# Patient Record
Sex: FEMALE | Race: WHITE | Employment: FULL TIME | URBAN - METROPOLITAN AREA
[De-identification: names, ages, dates, MRNs, and addresses within clinical notes are randomized per-mention and may not be internally consistent; named-entity substitution may affect disease eponyms.]

---

## 2017-04-21 ENCOUNTER — OFFICE VISIT (OUTPATIENT)
Dept: FAMILY MEDICINE CLINIC | Age: 37
End: 2017-04-21

## 2017-04-21 VITALS
WEIGHT: 134 LBS | SYSTOLIC BLOOD PRESSURE: 120 MMHG | HEIGHT: 65 IN | BODY MASS INDEX: 22.33 KG/M2 | DIASTOLIC BLOOD PRESSURE: 80 MMHG

## 2017-04-21 DIAGNOSIS — N63.0 BREAST NODULE: ICD-10-CM

## 2017-04-21 DIAGNOSIS — N64.52 DISCHARGE FROM RIGHT NIPPLE: Primary | ICD-10-CM

## 2017-04-21 PROCEDURE — 99213 OFFICE O/P EST LOW 20 MIN: CPT | Performed by: PHYSICIAN ASSISTANT

## 2017-04-21 ASSESSMENT — ENCOUNTER SYMPTOMS: RESPIRATORY NEGATIVE: 1

## 2017-04-27 ENCOUNTER — HOSPITAL ENCOUNTER (OUTPATIENT)
Dept: MAMMOGRAPHY | Age: 37
Discharge: HOME | End: 2017-04-27
Attending: PHYSICIAN ASSISTANT | Admitting: PHYSICIAN ASSISTANT

## 2017-04-27 ENCOUNTER — TELEPHONE (OUTPATIENT)
Dept: FAMILY MEDICINE CLINIC | Age: 37
End: 2017-04-27

## 2017-04-27 DIAGNOSIS — R23.4 BREAST SKIN CHANGES: Primary | ICD-10-CM

## 2017-04-27 DIAGNOSIS — N64.52 DISCHARGE FROM RIGHT NIPPLE: ICD-10-CM

## 2017-04-27 DIAGNOSIS — N64.52 NIPPLE DISCHARGE IN FEMALE: ICD-10-CM

## 2017-04-27 DIAGNOSIS — N63.10 LUMP OF RIGHT BREAST: ICD-10-CM

## 2017-06-21 ENCOUNTER — OFFICE VISIT (OUTPATIENT)
Dept: OBGYN CLINIC | Age: 37
End: 2017-06-21

## 2017-06-21 VITALS
HEIGHT: 65 IN | DIASTOLIC BLOOD PRESSURE: 66 MMHG | BODY MASS INDEX: 22.37 KG/M2 | SYSTOLIC BLOOD PRESSURE: 118 MMHG | HEART RATE: 93 BPM | TEMPERATURE: 99.1 F | WEIGHT: 134.25 LBS

## 2017-06-21 DIAGNOSIS — Z01.419 WOMEN'S ANNUAL ROUTINE GYNECOLOGICAL EXAMINATION: Primary | ICD-10-CM

## 2017-06-21 DIAGNOSIS — Z12.4 PAP SMEAR FOR CERVICAL CANCER SCREENING: ICD-10-CM

## 2017-06-21 DIAGNOSIS — N92.6 IRREGULAR MENSES: ICD-10-CM

## 2017-06-21 DIAGNOSIS — Z32.02 PREGNANCY TEST NEGATIVE: ICD-10-CM

## 2017-06-21 LAB
BASOPHILS ABSOLUTE: 0.1 K/UL (ref 0–0.2)
BASOPHILS RELATIVE PERCENT: 0.9 %
CONTROL: NORMAL
EOSINOPHILS ABSOLUTE: 0.1 K/UL (ref 0–0.6)
EOSINOPHILS RELATIVE PERCENT: 1.1 %
FOLLICLE STIMULATING HORMONE: 7.7 MIU/ML
HCT VFR BLD CALC: 40.5 % (ref 36–48)
HEMOGLOBIN: 13.4 G/DL (ref 12–16)
LUTEINIZING HORMONE: 11.6 MIU/ML
LYMPHOCYTES ABSOLUTE: 2.1 K/UL (ref 1–5.1)
LYMPHOCYTES RELATIVE PERCENT: 31.1 %
MCH RBC QN AUTO: 33.1 PG (ref 26–34)
MCHC RBC AUTO-ENTMCNC: 33.2 G/DL (ref 31–36)
MCV RBC AUTO: 99.6 FL (ref 80–100)
MONOCYTES ABSOLUTE: 0.5 K/UL (ref 0–1.3)
MONOCYTES RELATIVE PERCENT: 7.1 %
NEUTROPHILS ABSOLUTE: 4 K/UL (ref 1.7–7.7)
NEUTROPHILS RELATIVE PERCENT: 59.8 %
PDW BLD-RTO: 13.9 % (ref 12.4–15.4)
PLATELET # BLD: 192 K/UL (ref 135–450)
PMV BLD AUTO: 9.4 FL (ref 5–10.5)
PREGNANCY TEST URINE, POC: NEGATIVE
PROLACTIN: 16.4 NG/ML
RBC # BLD: 4.06 M/UL (ref 4–5.2)
TSH REFLEX: 1.28 UIU/ML (ref 0.27–4.2)
WBC # BLD: 6.6 K/UL (ref 4–11)

## 2017-06-21 PROCEDURE — 81025 URINE PREGNANCY TEST: CPT | Performed by: OBSTETRICS & GYNECOLOGY

## 2017-06-21 PROCEDURE — 99395 PREV VISIT EST AGE 18-39: CPT | Performed by: OBSTETRICS & GYNECOLOGY

## 2017-06-21 PROCEDURE — 36415 COLL VENOUS BLD VENIPUNCTURE: CPT | Performed by: OBSTETRICS & GYNECOLOGY

## 2017-06-21 RX ORDER — LEVONORGESTREL AND ETHINYL ESTRADIOL 0.15-0.03
1 KIT ORAL DAILY
Qty: 1 PACKET | Refills: 3 | Status: SHIPPED | OUTPATIENT
Start: 2017-06-21 | End: 2017-07-14 | Stop reason: SINTOL

## 2017-06-21 ASSESSMENT — ENCOUNTER SYMPTOMS
DIARRHEA: 0
SHORTNESS OF BREATH: 0
ABDOMINAL PAIN: 0
VOMITING: 0
ABDOMINAL DISTENTION: 0
NAUSEA: 0
CONSTIPATION: 0

## 2017-06-21 ASSESSMENT — PATIENT HEALTH QUESTIONNAIRE - PHQ9
SUM OF ALL RESPONSES TO PHQ QUESTIONS 1-9: 0
SUM OF ALL RESPONSES TO PHQ9 QUESTIONS 1 & 2: 0
1. LITTLE INTEREST OR PLEASURE IN DOING THINGS: 0
2. FEELING DOWN, DEPRESSED OR HOPELESS: 0

## 2017-06-23 LAB
HPV COMMENT: NORMAL
HPV TYPE 16: NOT DETECTED
HPV TYPE 18: NOT DETECTED
HPVOH (OTHER TYPES): NOT DETECTED

## 2017-07-14 ENCOUNTER — OFFICE VISIT (OUTPATIENT)
Dept: FAMILY MEDICINE CLINIC | Age: 37
End: 2017-07-14

## 2017-07-14 VITALS
SYSTOLIC BLOOD PRESSURE: 108 MMHG | WEIGHT: 137.8 LBS | BODY MASS INDEX: 22.96 KG/M2 | HEIGHT: 65 IN | HEART RATE: 68 BPM | OXYGEN SATURATION: 99 % | DIASTOLIC BLOOD PRESSURE: 76 MMHG

## 2017-07-14 DIAGNOSIS — F41.9 ANXIETY: Primary | ICD-10-CM

## 2017-07-14 PROCEDURE — 99213 OFFICE O/P EST LOW 20 MIN: CPT | Performed by: PHYSICIAN ASSISTANT

## 2018-01-15 NOTE — TELEPHONE ENCOUNTER
Brandi Lank is requesting refill(s) Effexor  (Name Brand Only)  One pill left. If a PA is needed she said she will take the generic so she is not left without taking medication.   Last OV 07-14-17 (pertaining to medication)  LR 07-14-17 (per medication requested)  Next office visit scheduled or attempted Yes   If no, reason:  01-23-18  [facility], [facility] [address] 69

## 2018-01-16 RX ORDER — VENLAFAXINE HYDROCHLORIDE 37.5 MG/1
CAPSULE, EXTENDED RELEASE ORAL
Qty: 30 CAPSULE | Refills: 3 | Status: SHIPPED | OUTPATIENT
Start: 2018-01-16 | End: 2018-05-14 | Stop reason: SDUPTHER

## 2018-01-16 NOTE — TELEPHONE ENCOUNTER
Patrick is calling about the patients Effexor medication. They said that the patients insurance company is requiring a Prior Authorization for the medication. The patient informed them that she will take the generic form of the medication, but they will need a script that doesn't have the note stating brand only attached. The patient informed the pharmacist that she only has one pill left and would need to get the refill as soon as possible.

## 2018-01-23 ENCOUNTER — OFFICE VISIT (OUTPATIENT)
Dept: FAMILY MEDICINE CLINIC | Age: 38
End: 2018-01-23

## 2018-01-23 VITALS
HEART RATE: 72 BPM | BODY MASS INDEX: 23.31 KG/M2 | DIASTOLIC BLOOD PRESSURE: 82 MMHG | WEIGHT: 139 LBS | SYSTOLIC BLOOD PRESSURE: 120 MMHG

## 2018-01-23 DIAGNOSIS — Z87.442 HISTORY OF KIDNEY STONES: ICD-10-CM

## 2018-01-23 DIAGNOSIS — F41.8 ANXIETY WITH DEPRESSION: Primary | ICD-10-CM

## 2018-01-23 PROCEDURE — G8420 CALC BMI NORM PARAMETERS: HCPCS | Performed by: FAMILY MEDICINE

## 2018-01-23 PROCEDURE — 99213 OFFICE O/P EST LOW 20 MIN: CPT | Performed by: FAMILY MEDICINE

## 2018-01-23 PROCEDURE — 1036F TOBACCO NON-USER: CPT | Performed by: FAMILY MEDICINE

## 2018-01-23 PROCEDURE — G8484 FLU IMMUNIZE NO ADMIN: HCPCS | Performed by: FAMILY MEDICINE

## 2018-01-23 PROCEDURE — G8427 DOCREV CUR MEDS BY ELIG CLIN: HCPCS | Performed by: FAMILY MEDICINE

## 2018-04-26 ENCOUNTER — OFFICE VISIT (OUTPATIENT)
Dept: OBGYN CLINIC | Age: 38
End: 2018-04-26

## 2018-04-26 VITALS
BODY MASS INDEX: 22.42 KG/M2 | HEIGHT: 65 IN | HEART RATE: 99 BPM | SYSTOLIC BLOOD PRESSURE: 118 MMHG | DIASTOLIC BLOOD PRESSURE: 80 MMHG | TEMPERATURE: 98.2 F | WEIGHT: 134.6 LBS

## 2018-04-26 DIAGNOSIS — N92.0 MENORRHAGIA WITH REGULAR CYCLE: Primary | ICD-10-CM

## 2018-04-26 DIAGNOSIS — Z30.09 GENERAL COUNSELING AND ADVICE FOR CONTRACEPTIVE MANAGEMENT: ICD-10-CM

## 2018-04-26 PROCEDURE — G8420 CALC BMI NORM PARAMETERS: HCPCS | Performed by: OBSTETRICS & GYNECOLOGY

## 2018-04-26 PROCEDURE — G8427 DOCREV CUR MEDS BY ELIG CLIN: HCPCS | Performed by: OBSTETRICS & GYNECOLOGY

## 2018-04-26 PROCEDURE — 1036F TOBACCO NON-USER: CPT | Performed by: OBSTETRICS & GYNECOLOGY

## 2018-04-26 PROCEDURE — 99213 OFFICE O/P EST LOW 20 MIN: CPT | Performed by: OBSTETRICS & GYNECOLOGY

## 2018-04-26 RX ORDER — NORETHINDRONE ACETATE AND ETHINYL ESTRADIOL 1MG-20(21)
1 KIT ORAL DAILY
Qty: 1 PACKET | Refills: 5 | Status: SHIPPED | OUTPATIENT
Start: 2018-04-26 | End: 2018-11-14 | Stop reason: ALTCHOICE

## 2018-04-26 ASSESSMENT — ENCOUNTER SYMPTOMS
DIARRHEA: 0
SHORTNESS OF BREATH: 0
NAUSEA: 0
VOMITING: 0
CONSTIPATION: 0
ABDOMINAL PAIN: 0

## 2018-05-04 ENCOUNTER — TELEPHONE (OUTPATIENT)
Dept: OBGYN CLINIC | Age: 38
End: 2018-05-04

## 2018-05-11 RX ORDER — VENLAFAXINE HYDROCHLORIDE 37.5 MG/1
CAPSULE, EXTENDED RELEASE ORAL
Qty: 30 CAPSULE | Refills: 5 | Status: CANCELLED | OUTPATIENT
Start: 2018-05-11

## 2018-05-14 RX ORDER — VENLAFAXINE HYDROCHLORIDE 37.5 MG/1
CAPSULE, EXTENDED RELEASE ORAL
Qty: 30 CAPSULE | Refills: 3 | Status: CANCELLED | OUTPATIENT
Start: 2018-05-14

## 2018-05-15 ENCOUNTER — TELEPHONE (OUTPATIENT)
Dept: FAMILY MEDICINE CLINIC | Age: 38
End: 2018-05-15

## 2018-05-17 ENCOUNTER — TELEPHONE (OUTPATIENT)
Dept: ORTHOPEDIC SURGERY | Age: 38
End: 2018-05-17

## 2018-06-18 ENCOUNTER — OFFICE VISIT (OUTPATIENT)
Dept: OBGYN CLINIC | Age: 38
End: 2018-06-18

## 2018-06-18 VITALS
TEMPERATURE: 98.5 F | WEIGHT: 139 LBS | BODY MASS INDEX: 23.13 KG/M2 | HEART RATE: 95 BPM | DIASTOLIC BLOOD PRESSURE: 84 MMHG | SYSTOLIC BLOOD PRESSURE: 132 MMHG

## 2018-06-18 DIAGNOSIS — N92.0 MENORRHAGIA WITH REGULAR CYCLE: ICD-10-CM

## 2018-06-18 DIAGNOSIS — Z30.430 ENCOUNTER FOR IUD INSERTION: Primary | ICD-10-CM

## 2018-06-18 DIAGNOSIS — Z32.02 NEGATIVE PREGNANCY TEST: ICD-10-CM

## 2018-06-18 LAB
CONTROL: NORMAL
PREGNANCY TEST URINE, POC: NEGATIVE

## 2018-06-18 PROCEDURE — 58300 INSERT INTRAUTERINE DEVICE: CPT | Performed by: OBSTETRICS & GYNECOLOGY

## 2018-06-18 PROCEDURE — 81025 URINE PREGNANCY TEST: CPT | Performed by: OBSTETRICS & GYNECOLOGY

## 2018-06-18 PROCEDURE — 99999 PR OFFICE/OUTPT VISIT,PROCEDURE ONLY: CPT | Performed by: OBSTETRICS & GYNECOLOGY

## 2018-06-24 ASSESSMENT — ENCOUNTER SYMPTOMS
SHORTNESS OF BREATH: 0
ABDOMINAL DISTENTION: 0

## 2018-07-16 ENCOUNTER — OFFICE VISIT (OUTPATIENT)
Dept: OBGYN CLINIC | Age: 38
End: 2018-07-16

## 2018-07-16 VITALS
WEIGHT: 140.25 LBS | HEART RATE: 85 BPM | TEMPERATURE: 98.3 F | DIASTOLIC BLOOD PRESSURE: 74 MMHG | SYSTOLIC BLOOD PRESSURE: 128 MMHG | BODY MASS INDEX: 23.34 KG/M2

## 2018-07-16 DIAGNOSIS — N92.6 IRREGULAR BLEEDING: ICD-10-CM

## 2018-07-16 DIAGNOSIS — N92.6 IRREGULAR BLEEDING: Primary | ICD-10-CM

## 2018-07-16 DIAGNOSIS — Z97.5 IUD (INTRAUTERINE DEVICE) IN PLACE: ICD-10-CM

## 2018-07-16 DIAGNOSIS — N92.0 MENORRHAGIA WITH REGULAR CYCLE: ICD-10-CM

## 2018-07-16 DIAGNOSIS — Z97.5 IUD (INTRAUTERINE DEVICE) IN PLACE: Primary | ICD-10-CM

## 2018-07-16 PROCEDURE — 76856 US EXAM PELVIC COMPLETE: CPT | Performed by: OBSTETRICS & GYNECOLOGY

## 2018-07-16 PROCEDURE — 1036F TOBACCO NON-USER: CPT | Performed by: OBSTETRICS & GYNECOLOGY

## 2018-07-16 PROCEDURE — 99213 OFFICE O/P EST LOW 20 MIN: CPT | Performed by: OBSTETRICS & GYNECOLOGY

## 2018-07-16 PROCEDURE — G8427 DOCREV CUR MEDS BY ELIG CLIN: HCPCS | Performed by: OBSTETRICS & GYNECOLOGY

## 2018-07-16 PROCEDURE — G8420 CALC BMI NORM PARAMETERS: HCPCS | Performed by: OBSTETRICS & GYNECOLOGY

## 2018-07-16 ASSESSMENT — ENCOUNTER SYMPTOMS
NAUSEA: 0
CONSTIPATION: 0
VOMITING: 0
ABDOMINAL PAIN: 0
DIARRHEA: 0
SHORTNESS OF BREATH: 0

## 2018-07-17 NOTE — PROGRESS NOTES
note and vitals reviewed. PELVIC ULTRASOUND without DOPPLER INTERROGATION   NON OB    DATE: 07/16/2018    PHYSICIAN:  HALEY Camarena D.O.    SONOGRAPHER:  BRITT [facility]    INDICATION:  IUD placement    COMPARISON: none    TYPE OF SCAN: vaginal    FINDINGS:    The cul de sac is normal. No cul de sac fluid is appreciated. The cervix is normal and not enlarged. Nabothian cyst/s is not noted within the uterine cervix. The uterus is measures 7.51cm x 4.99cm x 4.50cm. The uterus is anteverted. The endometrium measures 5.34 mm. No uterine anomalies are noted. The myometrium is normal in appearance. The right ovary is present and normal.  The right ovary measures 4.07cm x 3.25cm x 2.47cm. Ovary/adnexal findings:  no masses seen. The right adnexa is normal.    The left ovary is present and normal.  The left ovary measures 1.68cm x 2.84cm x 2.39cm. Ovary/adnexal findings:  no masses seen. The left adnexa is normal.    IMPRESSION:  Normal appearing uterus with IUD in appropriate position. Normal appearing right and left ovary. Imaging is limited secondary to bowel gas. The patient is well aware of the limitations of ultrasound in the detection of anomalies of the abdomen and pelvis. Assessment:       Diagnosis Orders   1. Irregular bleeding     2. IUD (intrauterine device) in place     3. Menorrhagia with regular cycle             Plan:      Ultrasound result reviewed with patient--reassurance  Menstrual diary  Follow up prn and 1 year.

## 2018-11-12 ENCOUNTER — TELEPHONE (OUTPATIENT)
Dept: FAMILY MEDICINE CLINIC | Age: 38
End: 2018-11-12

## 2018-11-12 NOTE — TELEPHONE ENCOUNTER
Martha Shine is requesting refill(s) Effexor  Last OV 1/23/18 (pertaining to medication)  LR 5/14/18 (per medication requested)  Next office visit scheduled or attempted Yes   If no, reason:  Pt is scheduled for 11/14/18

## 2018-11-13 RX ORDER — VENLAFAXINE HYDROCHLORIDE 37.5 MG/1
CAPSULE, EXTENDED RELEASE ORAL
Qty: 30 CAPSULE | Refills: 0 | Status: SHIPPED | OUTPATIENT
Start: 2018-11-13 | End: 2018-11-14 | Stop reason: SDUPTHER

## 2018-11-14 ENCOUNTER — OFFICE VISIT (OUTPATIENT)
Dept: FAMILY MEDICINE CLINIC | Age: 38
End: 2018-11-14
Payer: COMMERCIAL

## 2018-11-14 VITALS
RESPIRATION RATE: 18 BRPM | HEART RATE: 88 BPM | TEMPERATURE: 98.4 F | OXYGEN SATURATION: 97 % | HEIGHT: 65 IN | SYSTOLIC BLOOD PRESSURE: 118 MMHG | DIASTOLIC BLOOD PRESSURE: 82 MMHG | WEIGHT: 148 LBS | BODY MASS INDEX: 24.66 KG/M2

## 2018-11-14 DIAGNOSIS — F41.8 ANXIETY WITH DEPRESSION: Primary | ICD-10-CM

## 2018-11-14 PROCEDURE — 99213 OFFICE O/P EST LOW 20 MIN: CPT | Performed by: PHYSICIAN ASSISTANT

## 2018-11-14 PROCEDURE — G8420 CALC BMI NORM PARAMETERS: HCPCS | Performed by: PHYSICIAN ASSISTANT

## 2018-11-14 PROCEDURE — 1036F TOBACCO NON-USER: CPT | Performed by: PHYSICIAN ASSISTANT

## 2018-11-14 PROCEDURE — G8484 FLU IMMUNIZE NO ADMIN: HCPCS | Performed by: PHYSICIAN ASSISTANT

## 2018-11-14 PROCEDURE — G8427 DOCREV CUR MEDS BY ELIG CLIN: HCPCS | Performed by: PHYSICIAN ASSISTANT

## 2018-11-14 RX ORDER — VENLAFAXINE HYDROCHLORIDE 37.5 MG/1
CAPSULE, EXTENDED RELEASE ORAL
Qty: 90 CAPSULE | Refills: 2 | Status: SHIPPED | OUTPATIENT
Start: 2018-11-14 | End: 2019-06-11 | Stop reason: SDUPTHER

## 2019-01-08 ENCOUNTER — OFFICE VISIT (OUTPATIENT)
Dept: OBGYN CLINIC | Age: 39
End: 2019-01-08
Payer: MEDICAID

## 2019-01-08 VITALS
BODY MASS INDEX: 24.36 KG/M2 | HEART RATE: 85 BPM | TEMPERATURE: 98.9 F | SYSTOLIC BLOOD PRESSURE: 126 MMHG | WEIGHT: 146.4 LBS | DIASTOLIC BLOOD PRESSURE: 84 MMHG

## 2019-01-08 DIAGNOSIS — N92.1 MENORRHAGIA WITH IRREGULAR CYCLE: ICD-10-CM

## 2019-01-08 DIAGNOSIS — N92.6 IRREGULAR PERIODS/MENSTRUAL CYCLES: Primary | ICD-10-CM

## 2019-01-08 LAB — GONADOTROPIN, CHORIONIC (HCG) QUANT: <5 MIU/ML

## 2019-01-08 PROCEDURE — G8484 FLU IMMUNIZE NO ADMIN: HCPCS | Performed by: OBSTETRICS & GYNECOLOGY

## 2019-01-08 PROCEDURE — 36415 COLL VENOUS BLD VENIPUNCTURE: CPT | Performed by: OBSTETRICS & GYNECOLOGY

## 2019-01-08 PROCEDURE — G8427 DOCREV CUR MEDS BY ELIG CLIN: HCPCS | Performed by: OBSTETRICS & GYNECOLOGY

## 2019-01-08 PROCEDURE — 1036F TOBACCO NON-USER: CPT | Performed by: OBSTETRICS & GYNECOLOGY

## 2019-01-08 PROCEDURE — G8420 CALC BMI NORM PARAMETERS: HCPCS | Performed by: OBSTETRICS & GYNECOLOGY

## 2019-01-08 PROCEDURE — 99213 OFFICE O/P EST LOW 20 MIN: CPT | Performed by: OBSTETRICS & GYNECOLOGY

## 2019-01-09 ENCOUNTER — TELEPHONE (OUTPATIENT)
Dept: OBGYN CLINIC | Age: 39
End: 2019-01-09

## 2019-01-13 ASSESSMENT — ENCOUNTER SYMPTOMS
ABDOMINAL PAIN: 0
NAUSEA: 0
DIARRHEA: 0
CONSTIPATION: 0
ABDOMINAL DISTENTION: 0
VOMITING: 0
SHORTNESS OF BREATH: 0

## 2019-02-12 ENCOUNTER — OFFICE VISIT (OUTPATIENT)
Dept: OBGYN CLINIC | Age: 39
End: 2019-02-12

## 2019-02-12 ENCOUNTER — OFFICE VISIT (OUTPATIENT)
Dept: FAMILY MEDICINE CLINIC | Age: 39
End: 2019-02-12
Payer: COMMERCIAL

## 2019-02-12 VITALS
HEART RATE: 101 BPM | WEIGHT: 149.6 LBS | OXYGEN SATURATION: 99 % | BODY MASS INDEX: 24.93 KG/M2 | HEIGHT: 65 IN | DIASTOLIC BLOOD PRESSURE: 64 MMHG | SYSTOLIC BLOOD PRESSURE: 110 MMHG

## 2019-02-12 VITALS
HEIGHT: 65 IN | SYSTOLIC BLOOD PRESSURE: 112 MMHG | HEART RATE: 85 BPM | WEIGHT: 143 LBS | BODY MASS INDEX: 23.82 KG/M2 | TEMPERATURE: 98.6 F | DIASTOLIC BLOOD PRESSURE: 72 MMHG

## 2019-02-12 DIAGNOSIS — N94.6 SEVERE MENSTRUAL CRAMPS: ICD-10-CM

## 2019-02-12 DIAGNOSIS — N92.0 MENORRHAGIA WITH REGULAR CYCLE: ICD-10-CM

## 2019-02-12 DIAGNOSIS — N92.1 MENORRHAGIA WITH IRREGULAR CYCLE: Primary | ICD-10-CM

## 2019-02-12 DIAGNOSIS — Z01.818 PREOP EXAMINATION: ICD-10-CM

## 2019-02-12 DIAGNOSIS — Z01.818 PREOP EXAMINATION: Primary | ICD-10-CM

## 2019-02-12 PROCEDURE — PREOPEXAM PRE-OP EXAM: Performed by: OBSTETRICS & GYNECOLOGY

## 2019-02-12 PROCEDURE — 99213 OFFICE O/P EST LOW 20 MIN: CPT | Performed by: FAMILY MEDICINE

## 2019-02-12 PROCEDURE — G8427 DOCREV CUR MEDS BY ELIG CLIN: HCPCS | Performed by: FAMILY MEDICINE

## 2019-02-12 PROCEDURE — 1036F TOBACCO NON-USER: CPT | Performed by: FAMILY MEDICINE

## 2019-02-12 PROCEDURE — G8420 CALC BMI NORM PARAMETERS: HCPCS | Performed by: FAMILY MEDICINE

## 2019-02-12 PROCEDURE — G8484 FLU IMMUNIZE NO ADMIN: HCPCS | Performed by: FAMILY MEDICINE

## 2019-02-14 ASSESSMENT — ENCOUNTER SYMPTOMS
ABDOMINAL DISTENTION: 0
NAUSEA: 0
ABDOMINAL PAIN: 0
DIARRHEA: 0
SHORTNESS OF BREATH: 0
VOMITING: 0
CONSTIPATION: 0

## 2019-02-20 ENCOUNTER — ANESTHESIA EVENT (OUTPATIENT)
Dept: OPERATING ROOM | Age: 39
End: 2019-02-20
Payer: COMMERCIAL

## 2019-02-21 ENCOUNTER — ANESTHESIA (OUTPATIENT)
Dept: OPERATING ROOM | Age: 39
End: 2019-02-21
Payer: COMMERCIAL

## 2019-02-21 ENCOUNTER — HOSPITAL ENCOUNTER (OUTPATIENT)
Age: 39
Discharge: HOME | End: 2019-02-22
Attending: OBSTETRICS & GYNECOLOGY | Admitting: OBSTETRICS & GYNECOLOGY
Payer: COMMERCIAL

## 2019-02-21 VITALS
OXYGEN SATURATION: 100 % | TEMPERATURE: 97.8 F | DIASTOLIC BLOOD PRESSURE: 108 MMHG | SYSTOLIC BLOOD PRESSURE: 133 MMHG | RESPIRATION RATE: 4 BRPM

## 2019-02-21 DIAGNOSIS — Z98.890 S/P ROBOT-ASSISTED SURGICAL PROCEDURE: Primary | ICD-10-CM

## 2019-02-21 DIAGNOSIS — N92.0 MENORRHAGIA WITH REGULAR CYCLE: ICD-10-CM

## 2019-02-21 LAB
ABO/RH: NORMAL
ANION GAP SERPL CALCULATED.3IONS-SCNC: 10 MMOL/L (ref 3–16)
ANTIBODY SCREEN: NORMAL
BUN BLDV-MCNC: 13 MG/DL (ref 7–20)
CALCIUM SERPL-MCNC: 8.7 MG/DL (ref 8.3–10.6)
CHLORIDE BLD-SCNC: 105 MMOL/L (ref 99–110)
CO2: 24 MMOL/L (ref 21–32)
CREAT SERPL-MCNC: <0.5 MG/DL (ref 0.6–1.1)
GFR AFRICAN AMERICAN: >60
GFR NON-AFRICAN AMERICAN: >60
GLUCOSE BLD-MCNC: 89 MG/DL (ref 70–99)
HCT VFR BLD CALC: 39.5 % (ref 36–48)
HEMOGLOBIN: 13.6 G/DL (ref 12–16)
MCH RBC QN AUTO: 34.4 PG (ref 26–34)
MCHC RBC AUTO-ENTMCNC: 34.5 G/DL (ref 31–36)
MCV RBC AUTO: 99.8 FL (ref 80–100)
PDW BLD-RTO: 13.2 % (ref 12.4–15.4)
PLATELET # BLD: 207 K/UL (ref 135–450)
PMV BLD AUTO: 8.5 FL (ref 5–10.5)
POTASSIUM REFLEX MAGNESIUM: 4.5 MMOL/L (ref 3.5–5.1)
PREGNANCY, URINE: NEGATIVE
RBC # BLD: 3.96 M/UL (ref 4–5.2)
SODIUM BLD-SCNC: 139 MMOL/L (ref 136–145)
WBC # BLD: 5.5 K/UL (ref 4–11)

## 2019-02-21 PROCEDURE — 58571 TLH W/T/O 250 G OR LESS: CPT | Performed by: OBSTETRICS & GYNECOLOGY

## 2019-02-21 PROCEDURE — 6360000002 HC RX W HCPCS: Performed by: NURSE ANESTHETIST, CERTIFIED REGISTERED

## 2019-02-21 PROCEDURE — 85027 COMPLETE CBC AUTOMATED: CPT

## 2019-02-21 PROCEDURE — 2580000003 HC RX 258: Performed by: ANESTHESIOLOGY

## 2019-02-21 PROCEDURE — 2580000003 HC RX 258: Performed by: NURSE ANESTHETIST, CERTIFIED REGISTERED

## 2019-02-21 PROCEDURE — 3600000009 HC SURGERY ROBOT BASE: Performed by: OBSTETRICS & GYNECOLOGY

## 2019-02-21 PROCEDURE — 86901 BLOOD TYPING SEROLOGIC RH(D): CPT

## 2019-02-21 PROCEDURE — 2709999900 HC NON-CHARGEABLE SUPPLY: Performed by: OBSTETRICS & GYNECOLOGY

## 2019-02-21 PROCEDURE — 6370000000 HC RX 637 (ALT 250 FOR IP): Performed by: ANESTHESIOLOGY

## 2019-02-21 PROCEDURE — 6360000002 HC RX W HCPCS: Performed by: ANESTHESIOLOGY

## 2019-02-21 PROCEDURE — 88304 TISSUE EXAM BY PATHOLOGIST: CPT

## 2019-02-21 PROCEDURE — 80048 BASIC METABOLIC PNL TOTAL CA: CPT

## 2019-02-21 PROCEDURE — 86900 BLOOD TYPING SEROLOGIC ABO: CPT

## 2019-02-21 PROCEDURE — 36415 COLL VENOUS BLD VENIPUNCTURE: CPT

## 2019-02-21 PROCEDURE — 3700000001 HC ADD 15 MINUTES (ANESTHESIA): Performed by: OBSTETRICS & GYNECOLOGY

## 2019-02-21 PROCEDURE — 88341 IMHCHEM/IMCYTCHM EA ADD ANTB: CPT

## 2019-02-21 PROCEDURE — 88342 IMHCHEM/IMCYTCHM 1ST ANTB: CPT

## 2019-02-21 PROCEDURE — S2900 ROBOTIC SURGICAL SYSTEM: HCPCS | Performed by: OBSTETRICS & GYNECOLOGY

## 2019-02-21 PROCEDURE — 6360000002 HC RX W HCPCS: Performed by: OBSTETRICS & GYNECOLOGY

## 2019-02-21 PROCEDURE — 86850 RBC ANTIBODY SCREEN: CPT

## 2019-02-21 PROCEDURE — 3600000019 HC SURGERY ROBOT ADDTL 15MIN: Performed by: OBSTETRICS & GYNECOLOGY

## 2019-02-21 PROCEDURE — 7100000000 HC PACU RECOVERY - FIRST 15 MIN: Performed by: OBSTETRICS & GYNECOLOGY

## 2019-02-21 PROCEDURE — 2580000003 HC RX 258: Performed by: OBSTETRICS & GYNECOLOGY

## 2019-02-21 PROCEDURE — 6370000000 HC RX 637 (ALT 250 FOR IP): Performed by: OBSTETRICS & GYNECOLOGY

## 2019-02-21 PROCEDURE — 84703 CHORIONIC GONADOTROPIN ASSAY: CPT

## 2019-02-21 PROCEDURE — 3700000000 HC ANESTHESIA ATTENDED CARE: Performed by: OBSTETRICS & GYNECOLOGY

## 2019-02-21 PROCEDURE — 7100000001 HC PACU RECOVERY - ADDTL 15 MIN: Performed by: OBSTETRICS & GYNECOLOGY

## 2019-02-21 PROCEDURE — 2500000003 HC RX 250 WO HCPCS: Performed by: NURSE ANESTHETIST, CERTIFIED REGISTERED

## 2019-02-21 PROCEDURE — 88307 TISSUE EXAM BY PATHOLOGIST: CPT

## 2019-02-21 RX ORDER — HYDROMORPHONE HCL 110MG/55ML
0.5 PATIENT CONTROLLED ANALGESIA SYRINGE INTRAVENOUS
Status: DISCONTINUED | OUTPATIENT
Start: 2019-02-21 | End: 2019-02-22 | Stop reason: HOSPADM

## 2019-02-21 RX ORDER — SCOLOPAMINE TRANSDERMAL SYSTEM 1 MG/1
1 PATCH, EXTENDED RELEASE TRANSDERMAL ONCE
Status: DISCONTINUED | OUTPATIENT
Start: 2019-02-21 | End: 2019-02-21

## 2019-02-21 RX ORDER — ONDANSETRON 2 MG/ML
4 INJECTION INTRAMUSCULAR; INTRAVENOUS EVERY 6 HOURS PRN
Status: DISCONTINUED | OUTPATIENT
Start: 2019-02-21 | End: 2019-02-22 | Stop reason: HOSPADM

## 2019-02-21 RX ORDER — ZOLPIDEM TARTRATE 5 MG/1
5 TABLET ORAL NIGHTLY PRN
Status: DISCONTINUED | OUTPATIENT
Start: 2019-02-21 | End: 2019-02-22 | Stop reason: HOSPADM

## 2019-02-21 RX ORDER — SODIUM CHLORIDE, SODIUM LACTATE, POTASSIUM CHLORIDE, AND CALCIUM CHLORIDE .6; .31; .03; .02 G/100ML; G/100ML; G/100ML; G/100ML
IRRIGANT IRRIGATION PRN
Status: DISCONTINUED | OUTPATIENT
Start: 2019-02-21 | End: 2019-02-21 | Stop reason: ALTCHOICE

## 2019-02-21 RX ORDER — DEXAMETHASONE SODIUM PHOSPHATE 4 MG/ML
INJECTION, SOLUTION INTRA-ARTICULAR; INTRALESIONAL; INTRAMUSCULAR; INTRAVENOUS; SOFT TISSUE PRN
Status: DISCONTINUED | OUTPATIENT
Start: 2019-02-21 | End: 2019-02-21 | Stop reason: SDUPTHER

## 2019-02-21 RX ORDER — TRAMADOL HYDROCHLORIDE 50 MG/1
50 TABLET ORAL EVERY 6 HOURS PRN
Status: DISCONTINUED | OUTPATIENT
Start: 2019-02-21 | End: 2019-02-22 | Stop reason: HOSPADM

## 2019-02-21 RX ORDER — KETOROLAC TROMETHAMINE 30 MG/ML
30 INJECTION, SOLUTION INTRAMUSCULAR; INTRAVENOUS EVERY 6 HOURS
Status: DISCONTINUED | OUTPATIENT
Start: 2019-02-21 | End: 2019-02-22 | Stop reason: HOSPADM

## 2019-02-21 RX ORDER — ACETAMINOPHEN 325 MG/1
650 TABLET ORAL EVERY 4 HOURS PRN
Status: DISCONTINUED | OUTPATIENT
Start: 2019-02-21 | End: 2019-02-22 | Stop reason: HOSPADM

## 2019-02-21 RX ORDER — DIPHENHYDRAMINE HYDROCHLORIDE 50 MG/ML
INJECTION INTRAMUSCULAR; INTRAVENOUS PRN
Status: DISCONTINUED | OUTPATIENT
Start: 2019-02-21 | End: 2019-02-21 | Stop reason: SDUPTHER

## 2019-02-21 RX ORDER — LIDOCAINE HYDROCHLORIDE 20 MG/ML
INJECTION, SOLUTION INFILTRATION; PERINEURAL PRN
Status: DISCONTINUED | OUTPATIENT
Start: 2019-02-21 | End: 2019-02-21 | Stop reason: SDUPTHER

## 2019-02-21 RX ORDER — PROPOFOL 10 MG/ML
INJECTION, EMULSION INTRAVENOUS PRN
Status: DISCONTINUED | OUTPATIENT
Start: 2019-02-21 | End: 2019-02-21 | Stop reason: SDUPTHER

## 2019-02-21 RX ORDER — HYDRALAZINE HYDROCHLORIDE 20 MG/ML
5 INJECTION INTRAMUSCULAR; INTRAVENOUS EVERY 10 MIN PRN
Status: DISCONTINUED | OUTPATIENT
Start: 2019-02-21 | End: 2019-02-21 | Stop reason: HOSPADM

## 2019-02-21 RX ORDER — SODIUM CHLORIDE 0.9 % (FLUSH) 0.9 %
10 SYRINGE (ML) INJECTION EVERY 12 HOURS SCHEDULED
Status: DISCONTINUED | OUTPATIENT
Start: 2019-02-21 | End: 2019-02-22 | Stop reason: HOSPADM

## 2019-02-21 RX ORDER — SODIUM CHLORIDE 0.9 % (FLUSH) 0.9 %
10 SYRINGE (ML) INJECTION PRN
Status: DISCONTINUED | OUTPATIENT
Start: 2019-02-21 | End: 2019-02-22 | Stop reason: HOSPADM

## 2019-02-21 RX ORDER — MORPHINE SULFATE 4 MG/ML
1 INJECTION, SOLUTION INTRAMUSCULAR; INTRAVENOUS EVERY 5 MIN PRN
Status: DISCONTINUED | OUTPATIENT
Start: 2019-02-21 | End: 2019-02-21 | Stop reason: HOSPADM

## 2019-02-21 RX ORDER — FENTANYL CITRATE 50 UG/ML
INJECTION, SOLUTION INTRAMUSCULAR; INTRAVENOUS PRN
Status: DISCONTINUED | OUTPATIENT
Start: 2019-02-21 | End: 2019-02-21 | Stop reason: SDUPTHER

## 2019-02-21 RX ORDER — MEPERIDINE HYDROCHLORIDE 50 MG/ML
12.5 INJECTION INTRAMUSCULAR; INTRAVENOUS; SUBCUTANEOUS EVERY 5 MIN PRN
Status: DISCONTINUED | OUTPATIENT
Start: 2019-02-21 | End: 2019-02-21 | Stop reason: HOSPADM

## 2019-02-21 RX ORDER — OXYCODONE HYDROCHLORIDE AND ACETAMINOPHEN 5; 325 MG/1; MG/1
1 TABLET ORAL PRN
Status: DISCONTINUED | OUTPATIENT
Start: 2019-02-21 | End: 2019-02-21 | Stop reason: HOSPADM

## 2019-02-21 RX ORDER — SODIUM CHLORIDE, SODIUM LACTATE, POTASSIUM CHLORIDE, CALCIUM CHLORIDE 600; 310; 30; 20 MG/100ML; MG/100ML; MG/100ML; MG/100ML
INJECTION, SOLUTION INTRAVENOUS CONTINUOUS
Status: DISCONTINUED | OUTPATIENT
Start: 2019-02-21 | End: 2019-02-21

## 2019-02-21 RX ORDER — MIDAZOLAM HYDROCHLORIDE 1 MG/ML
2 INJECTION INTRAMUSCULAR; INTRAVENOUS
Status: COMPLETED | OUTPATIENT
Start: 2019-02-21 | End: 2019-02-21

## 2019-02-21 RX ORDER — APREPITANT 40 MG/1
40 CAPSULE ORAL ONCE
Status: COMPLETED | OUTPATIENT
Start: 2019-02-21 | End: 2019-02-21

## 2019-02-21 RX ORDER — VENLAFAXINE HYDROCHLORIDE 37.5 MG/1
37.5 CAPSULE, EXTENDED RELEASE ORAL
Status: DISCONTINUED | OUTPATIENT
Start: 2019-02-22 | End: 2019-02-22 | Stop reason: HOSPADM

## 2019-02-21 RX ORDER — ZOLPIDEM TARTRATE 5 MG/1
5 TABLET ORAL NIGHTLY PRN
Status: DISCONTINUED | OUTPATIENT
Start: 2019-02-22 | End: 2019-02-21

## 2019-02-21 RX ORDER — LIDOCAINE HYDROCHLORIDE 10 MG/ML
1 INJECTION, SOLUTION EPIDURAL; INFILTRATION; INTRACAUDAL; PERINEURAL
Status: DISCONTINUED | OUTPATIENT
Start: 2019-02-21 | End: 2019-02-21 | Stop reason: HOSPADM

## 2019-02-21 RX ORDER — SODIUM CHLORIDE 0.9 % (FLUSH) 0.9 %
10 SYRINGE (ML) INJECTION EVERY 12 HOURS SCHEDULED
Status: DISCONTINUED | OUTPATIENT
Start: 2019-02-21 | End: 2019-02-21 | Stop reason: HOSPADM

## 2019-02-21 RX ORDER — ONDANSETRON 2 MG/ML
INJECTION INTRAMUSCULAR; INTRAVENOUS PRN
Status: DISCONTINUED | OUTPATIENT
Start: 2019-02-21 | End: 2019-02-21 | Stop reason: SDUPTHER

## 2019-02-21 RX ORDER — ONDANSETRON 2 MG/ML
4 INJECTION INTRAMUSCULAR; INTRAVENOUS
Status: DISCONTINUED | OUTPATIENT
Start: 2019-02-21 | End: 2019-02-21 | Stop reason: HOSPADM

## 2019-02-21 RX ORDER — MORPHINE SULFATE 4 MG/ML
2 INJECTION, SOLUTION INTRAMUSCULAR; INTRAVENOUS EVERY 5 MIN PRN
Status: DISCONTINUED | OUTPATIENT
Start: 2019-02-21 | End: 2019-02-21 | Stop reason: HOSPADM

## 2019-02-21 RX ORDER — LABETALOL HYDROCHLORIDE 5 MG/ML
5 INJECTION, SOLUTION INTRAVENOUS EVERY 10 MIN PRN
Status: DISCONTINUED | OUTPATIENT
Start: 2019-02-21 | End: 2019-02-21 | Stop reason: HOSPADM

## 2019-02-21 RX ORDER — NEOSTIGMINE METHYLSULFATE 5 MG/5 ML
SYRINGE (ML) INTRAVENOUS PRN
Status: DISCONTINUED | OUTPATIENT
Start: 2019-02-21 | End: 2019-02-21 | Stop reason: SDUPTHER

## 2019-02-21 RX ORDER — SODIUM CHLORIDE 0.9 % (FLUSH) 0.9 %
10 SYRINGE (ML) INJECTION PRN
Status: DISCONTINUED | OUTPATIENT
Start: 2019-02-21 | End: 2019-02-21 | Stop reason: HOSPADM

## 2019-02-21 RX ORDER — ROCURONIUM BROMIDE 10 MG/ML
INJECTION, SOLUTION INTRAVENOUS PRN
Status: DISCONTINUED | OUTPATIENT
Start: 2019-02-21 | End: 2019-02-21 | Stop reason: SDUPTHER

## 2019-02-21 RX ORDER — GLYCOPYRROLATE 0.2 MG/ML
INJECTION INTRAMUSCULAR; INTRAVENOUS PRN
Status: DISCONTINUED | OUTPATIENT
Start: 2019-02-21 | End: 2019-02-21 | Stop reason: SDUPTHER

## 2019-02-21 RX ORDER — SODIUM CHLORIDE, SODIUM LACTATE, POTASSIUM CHLORIDE, CALCIUM CHLORIDE 600; 310; 30; 20 MG/100ML; MG/100ML; MG/100ML; MG/100ML
INJECTION, SOLUTION INTRAVENOUS CONTINUOUS PRN
Status: DISCONTINUED | OUTPATIENT
Start: 2019-02-21 | End: 2019-02-21 | Stop reason: SDUPTHER

## 2019-02-21 RX ORDER — TRAMADOL HYDROCHLORIDE 50 MG/1
100 TABLET ORAL EVERY 6 HOURS PRN
Status: DISCONTINUED | OUTPATIENT
Start: 2019-02-21 | End: 2019-02-22 | Stop reason: HOSPADM

## 2019-02-21 RX ORDER — OXYCODONE HYDROCHLORIDE AND ACETAMINOPHEN 5; 325 MG/1; MG/1
2 TABLET ORAL PRN
Status: DISCONTINUED | OUTPATIENT
Start: 2019-02-21 | End: 2019-02-21 | Stop reason: HOSPADM

## 2019-02-21 RX ORDER — PROMETHAZINE HYDROCHLORIDE 25 MG/ML
6.25 INJECTION, SOLUTION INTRAMUSCULAR; INTRAVENOUS
Status: DISCONTINUED | OUTPATIENT
Start: 2019-02-21 | End: 2019-02-21 | Stop reason: HOSPADM

## 2019-02-21 RX ORDER — SODIUM CHLORIDE, SODIUM LACTATE, POTASSIUM CHLORIDE, CALCIUM CHLORIDE 600; 310; 30; 20 MG/100ML; MG/100ML; MG/100ML; MG/100ML
INJECTION, SOLUTION INTRAVENOUS CONTINUOUS
Status: DISCONTINUED | OUTPATIENT
Start: 2019-02-21 | End: 2019-02-22 | Stop reason: HOSPADM

## 2019-02-21 RX ORDER — DIPHENHYDRAMINE HYDROCHLORIDE 50 MG/ML
12.5 INJECTION INTRAMUSCULAR; INTRAVENOUS
Status: DISCONTINUED | OUTPATIENT
Start: 2019-02-21 | End: 2019-02-21 | Stop reason: HOSPADM

## 2019-02-21 RX ADMIN — SODIUM CHLORIDE, POTASSIUM CHLORIDE, SODIUM LACTATE AND CALCIUM CHLORIDE: 600; 310; 30; 20 INJECTION, SOLUTION INTRAVENOUS at 12:06

## 2019-02-21 RX ADMIN — PROPOFOL 200 MG: 10 INJECTION, EMULSION INTRAVENOUS at 07:34

## 2019-02-21 RX ADMIN — HYDROMORPHONE HYDROCHLORIDE 0.25 MG: 1 INJECTION, SOLUTION INTRAMUSCULAR; INTRAVENOUS; SUBCUTANEOUS at 09:57

## 2019-02-21 RX ADMIN — HYDROMORPHONE HYDROCHLORIDE 0.5 MG: 1 INJECTION, SOLUTION INTRAMUSCULAR; INTRAVENOUS; SUBCUTANEOUS at 08:40

## 2019-02-21 RX ADMIN — Medication 2 G: at 07:36

## 2019-02-21 RX ADMIN — LIDOCAINE HYDROCHLORIDE 40 MG: 20 INJECTION, SOLUTION INFILTRATION; PERINEURAL at 07:34

## 2019-02-21 RX ADMIN — GLYCOPYRROLATE 0.6 MG: 0.2 INJECTION, SOLUTION INTRAMUSCULAR; INTRAVENOUS at 09:30

## 2019-02-21 RX ADMIN — SODIUM CHLORIDE, PRESERVATIVE FREE 10 ML: 5 INJECTION INTRAVENOUS at 19:50

## 2019-02-21 RX ADMIN — SODIUM CHLORIDE, PRESERVATIVE FREE 10 ML: 5 INJECTION INTRAVENOUS at 13:40

## 2019-02-21 RX ADMIN — HYDROMORPHONE HYDROCHLORIDE 0.5 MG: 1 INJECTION, SOLUTION INTRAMUSCULAR; INTRAVENOUS; SUBCUTANEOUS at 09:44

## 2019-02-21 RX ADMIN — Medication 4 MG: at 09:30

## 2019-02-21 RX ADMIN — DIPHENHYDRAMINE HYDROCHLORIDE 6.25 MG: 50 INJECTION INTRAMUSCULAR; INTRAVENOUS at 08:09

## 2019-02-21 RX ADMIN — MIDAZOLAM HYDROCHLORIDE 2 MG: 2 INJECTION, SOLUTION INTRAMUSCULAR; INTRAVENOUS at 07:32

## 2019-02-21 RX ADMIN — DEXAMETHASONE SODIUM PHOSPHATE 8 MG: 4 INJECTION, SOLUTION INTRAMUSCULAR; INTRAVENOUS at 08:09

## 2019-02-21 RX ADMIN — ZOLPIDEM TARTRATE 5 MG: 5 TABLET ORAL at 21:31

## 2019-02-21 RX ADMIN — SODIUM CHLORIDE, POTASSIUM CHLORIDE, SODIUM LACTATE AND CALCIUM CHLORIDE: 600; 310; 30; 20 INJECTION, SOLUTION INTRAVENOUS at 14:07

## 2019-02-21 RX ADMIN — SODIUM CHLORIDE, POTASSIUM CHLORIDE, SODIUM LACTATE AND CALCIUM CHLORIDE: 600; 310; 30; 20 INJECTION, SOLUTION INTRAVENOUS at 08:10

## 2019-02-21 RX ADMIN — SODIUM CHLORIDE, POTASSIUM CHLORIDE, SODIUM LACTATE AND CALCIUM CHLORIDE: 600; 310; 30; 20 INJECTION, SOLUTION INTRAVENOUS at 07:29

## 2019-02-21 RX ADMIN — APREPITANT 40 MG: 40 CAPSULE ORAL at 06:54

## 2019-02-21 RX ADMIN — SODIUM CHLORIDE, POTASSIUM CHLORIDE, SODIUM LACTATE AND CALCIUM CHLORIDE: 600; 310; 30; 20 INJECTION, SOLUTION INTRAVENOUS at 06:53

## 2019-02-21 RX ADMIN — SODIUM CHLORIDE, POTASSIUM CHLORIDE, SODIUM LACTATE AND CALCIUM CHLORIDE: 600; 310; 30; 20 INJECTION, SOLUTION INTRAVENOUS at 07:40

## 2019-02-21 RX ADMIN — FENTANYL CITRATE 150 MCG: 50 INJECTION INTRAMUSCULAR; INTRAVENOUS at 07:34

## 2019-02-21 RX ADMIN — ROCURONIUM BROMIDE 50 MG: 10 SOLUTION INTRAVENOUS at 07:34

## 2019-02-21 RX ADMIN — ONDANSETRON 4 MG: 2 INJECTION INTRAMUSCULAR; INTRAVENOUS at 08:09

## 2019-02-21 RX ADMIN — HYDROMORPHONE HYDROCHLORIDE 0.5 MG: 1 INJECTION, SOLUTION INTRAMUSCULAR; INTRAVENOUS; SUBCUTANEOUS at 09:08

## 2019-02-21 RX ADMIN — KETOROLAC TROMETHAMINE 30 MG: 30 INJECTION, SOLUTION INTRAMUSCULAR at 19:49

## 2019-02-21 RX ADMIN — MIDAZOLAM HYDROCHLORIDE 2 MG: 2 INJECTION, SOLUTION INTRAMUSCULAR; INTRAVENOUS at 06:53

## 2019-02-21 RX ADMIN — ROCURONIUM BROMIDE 30 MG: 10 SOLUTION INTRAVENOUS at 08:47

## 2019-02-21 RX ADMIN — HYDROMORPHONE HYDROCHLORIDE 0.5 MG: 1 INJECTION, SOLUTION INTRAMUSCULAR; INTRAVENOUS; SUBCUTANEOUS at 09:18

## 2019-02-21 RX ADMIN — KETOROLAC TROMETHAMINE 30 MG: 30 INJECTION, SOLUTION INTRAMUSCULAR at 13:40

## 2019-02-21 RX ADMIN — SODIUM CHLORIDE, POTASSIUM CHLORIDE, SODIUM LACTATE AND CALCIUM CHLORIDE: 600; 310; 30; 20 INJECTION, SOLUTION INTRAVENOUS at 21:36

## 2019-02-21 RX ADMIN — FENTANYL CITRATE 100 MCG: 50 INJECTION INTRAMUSCULAR; INTRAVENOUS at 07:56

## 2019-02-21 RX ADMIN — TRAMADOL HYDROCHLORIDE 100 MG: 50 TABLET, FILM COATED ORAL at 16:39

## 2019-02-21 ASSESSMENT — PULMONARY FUNCTION TESTS
PIF_VALUE: 30
PIF_VALUE: 18
PIF_VALUE: 30
PIF_VALUE: 18
PIF_VALUE: 29
PIF_VALUE: 25
PIF_VALUE: 25
PIF_VALUE: 18
PIF_VALUE: 28
PIF_VALUE: 25
PIF_VALUE: 1
PIF_VALUE: 28
PIF_VALUE: 29
PIF_VALUE: 26
PIF_VALUE: 17
PIF_VALUE: 28
PIF_VALUE: 25
PIF_VALUE: 30
PIF_VALUE: 4
PIF_VALUE: 29
PIF_VALUE: 25
PIF_VALUE: 10
PIF_VALUE: 28
PIF_VALUE: 30
PIF_VALUE: 28
PIF_VALUE: 27
PIF_VALUE: 10
PIF_VALUE: 16
PIF_VALUE: 28
PIF_VALUE: 18
PIF_VALUE: 29
PIF_VALUE: 5
PIF_VALUE: 29
PIF_VALUE: 26
PIF_VALUE: 30
PIF_VALUE: 18
PIF_VALUE: 10
PIF_VALUE: 25
PIF_VALUE: 28
PIF_VALUE: 28
PIF_VALUE: 29
PIF_VALUE: 25
PIF_VALUE: 29
PIF_VALUE: 18
PIF_VALUE: 17
PIF_VALUE: 18
PIF_VALUE: 29
PIF_VALUE: 29
PIF_VALUE: 30
PIF_VALUE: 26
PIF_VALUE: 30
PIF_VALUE: 27
PIF_VALUE: 30
PIF_VALUE: 29
PIF_VALUE: 25
PIF_VALUE: 30
PIF_VALUE: 28
PIF_VALUE: 30
PIF_VALUE: 10
PIF_VALUE: 30
PIF_VALUE: 30
PIF_VALUE: 6
PIF_VALUE: 30
PIF_VALUE: 18
PIF_VALUE: 10
PIF_VALUE: 29
PIF_VALUE: 29
PIF_VALUE: 22
PIF_VALUE: 10
PIF_VALUE: 29
PIF_VALUE: 16
PIF_VALUE: 30
PIF_VALUE: 4
PIF_VALUE: 17
PIF_VALUE: 18
PIF_VALUE: 29
PIF_VALUE: 3
PIF_VALUE: 18
PIF_VALUE: 0
PIF_VALUE: 30
PIF_VALUE: 17
PIF_VALUE: 1
PIF_VALUE: 30
PIF_VALUE: 3
PIF_VALUE: 30
PIF_VALUE: 29
PIF_VALUE: 29
PIF_VALUE: 18
PIF_VALUE: 29
PIF_VALUE: 27
PIF_VALUE: 29
PIF_VALUE: 26
PIF_VALUE: 3
PIF_VALUE: 28
PIF_VALUE: 29
PIF_VALUE: 29
PIF_VALUE: 27
PIF_VALUE: 10
PIF_VALUE: 29
PIF_VALUE: 26
PIF_VALUE: 0
PIF_VALUE: 30

## 2019-02-21 ASSESSMENT — PAIN SCALES - GENERAL
PAINLEVEL_OUTOF10: 4
PAINLEVEL_OUTOF10: 2
PAINLEVEL_OUTOF10: 4
PAINLEVEL_OUTOF10: 6
PAINLEVEL_OUTOF10: 5
PAINLEVEL_OUTOF10: 7
PAINLEVEL_OUTOF10: 7

## 2019-02-21 ASSESSMENT — PAIN DESCRIPTION - ORIENTATION: ORIENTATION: LOWER

## 2019-02-21 ASSESSMENT — PAIN DESCRIPTION - DESCRIPTORS: DESCRIPTORS: CRAMPING;CONSTANT

## 2019-02-21 ASSESSMENT — PAIN DESCRIPTION - PAIN TYPE: TYPE: SURGICAL PAIN

## 2019-02-21 ASSESSMENT — PAIN DESCRIPTION - LOCATION: LOCATION: ABDOMEN

## 2019-02-21 ASSESSMENT — PAIN - FUNCTIONAL ASSESSMENT: PAIN_FUNCTIONAL_ASSESSMENT: 0-10

## 2019-02-22 VITALS
OXYGEN SATURATION: 100 % | DIASTOLIC BLOOD PRESSURE: 60 MMHG | SYSTOLIC BLOOD PRESSURE: 95 MMHG | TEMPERATURE: 98.4 F | RESPIRATION RATE: 16 BRPM | HEIGHT: 65 IN | HEART RATE: 87 BPM | BODY MASS INDEX: 23.66 KG/M2 | WEIGHT: 142 LBS

## 2019-02-22 LAB
BASOPHILS ABSOLUTE: 0 K/UL (ref 0–0.2)
BASOPHILS RELATIVE PERCENT: 0.1 %
EOSINOPHILS ABSOLUTE: 0 K/UL (ref 0–0.6)
EOSINOPHILS RELATIVE PERCENT: 0 %
HCT VFR BLD CALC: 32 % (ref 36–48)
HEMOGLOBIN: 10.8 G/DL (ref 12–16)
LYMPHOCYTES ABSOLUTE: 1.2 K/UL (ref 1–5.1)
LYMPHOCYTES RELATIVE PERCENT: 10.6 %
MCH RBC QN AUTO: 34.2 PG (ref 26–34)
MCHC RBC AUTO-ENTMCNC: 33.8 G/DL (ref 31–36)
MCV RBC AUTO: 101.3 FL (ref 80–100)
MONOCYTES ABSOLUTE: 0.8 K/UL (ref 0–1.3)
MONOCYTES RELATIVE PERCENT: 7.3 %
NEUTROPHILS ABSOLUTE: 9.3 K/UL (ref 1.7–7.7)
NEUTROPHILS RELATIVE PERCENT: 82 %
PDW BLD-RTO: 13.6 % (ref 12.4–15.4)
PLATELET # BLD: 164 K/UL (ref 135–450)
PMV BLD AUTO: 8.4 FL (ref 5–10.5)
RBC # BLD: 3.15 M/UL (ref 4–5.2)
WBC # BLD: 11.3 K/UL (ref 4–11)

## 2019-02-22 PROCEDURE — 2580000003 HC RX 258: Performed by: OBSTETRICS & GYNECOLOGY

## 2019-02-22 PROCEDURE — 85025 COMPLETE CBC W/AUTO DIFF WBC: CPT

## 2019-02-22 PROCEDURE — 36415 COLL VENOUS BLD VENIPUNCTURE: CPT

## 2019-02-22 PROCEDURE — 99024 POSTOP FOLLOW-UP VISIT: CPT | Performed by: OBSTETRICS & GYNECOLOGY

## 2019-02-22 PROCEDURE — 6360000002 HC RX W HCPCS: Performed by: OBSTETRICS & GYNECOLOGY

## 2019-02-22 PROCEDURE — 6370000000 HC RX 637 (ALT 250 FOR IP): Performed by: OBSTETRICS & GYNECOLOGY

## 2019-02-22 PROCEDURE — 99999 PR OFFICE/OUTPT VISIT,PROCEDURE ONLY: CPT | Performed by: OBSTETRICS & GYNECOLOGY

## 2019-02-22 RX ORDER — IBUPROFEN 800 MG/1
800 TABLET ORAL 3 TIMES DAILY PRN
Qty: 40 TABLET | Refills: 0 | Status: SHIPPED | OUTPATIENT
Start: 2019-02-22 | End: 2020-08-20

## 2019-02-22 RX ORDER — OXYCODONE HYDROCHLORIDE AND ACETAMINOPHEN 5; 325 MG/1; MG/1
1 TABLET ORAL EVERY 6 HOURS PRN
Qty: 28 TABLET | Refills: 0 | Status: SHIPPED | OUTPATIENT
Start: 2019-02-22 | End: 2019-03-01

## 2019-02-22 RX ADMIN — SODIUM CHLORIDE, POTASSIUM CHLORIDE, SODIUM LACTATE AND CALCIUM CHLORIDE: 600; 310; 30; 20 INJECTION, SOLUTION INTRAVENOUS at 06:04

## 2019-02-22 RX ADMIN — KETOROLAC TROMETHAMINE 30 MG: 30 INJECTION, SOLUTION INTRAMUSCULAR at 00:15

## 2019-02-22 RX ADMIN — SODIUM CHLORIDE, PRESERVATIVE FREE 10 ML: 5 INJECTION INTRAVENOUS at 08:31

## 2019-02-22 RX ADMIN — KETOROLAC TROMETHAMINE 30 MG: 30 INJECTION, SOLUTION INTRAMUSCULAR at 06:33

## 2019-02-22 RX ADMIN — VENLAFAXINE HYDROCHLORIDE 37.5 MG: 37.5 CAPSULE, EXTENDED RELEASE ORAL at 08:31

## 2019-02-22 ASSESSMENT — PAIN SCALES - GENERAL
PAINLEVEL_OUTOF10: 6
PAINLEVEL_OUTOF10: 5

## 2019-02-25 ENCOUNTER — TELEPHONE (OUTPATIENT)
Dept: OBGYN CLINIC | Age: 39
End: 2019-02-25

## 2019-03-05 ENCOUNTER — OFFICE VISIT (OUTPATIENT)
Dept: OBGYN CLINIC | Age: 39
End: 2019-03-05

## 2019-03-05 VITALS
BODY MASS INDEX: 25.39 KG/M2 | TEMPERATURE: 98.2 F | SYSTOLIC BLOOD PRESSURE: 110 MMHG | HEART RATE: 85 BPM | WEIGHT: 152.6 LBS | DIASTOLIC BLOOD PRESSURE: 80 MMHG

## 2019-03-05 DIAGNOSIS — Z09 POSTOP CHECK: Primary | ICD-10-CM

## 2019-03-05 DIAGNOSIS — Z98.890 S/P ROBOT-ASSISTED SURGICAL PROCEDURE: ICD-10-CM

## 2019-03-05 DIAGNOSIS — N87.1 MODERATE DYSPLASIA OF CERVIX (CIN II): ICD-10-CM

## 2019-03-05 PROBLEM — N92.0 MENORRHAGIA WITH REGULAR CYCLE: Status: RESOLVED | Noted: 2018-04-26 | Resolved: 2019-03-05

## 2019-03-05 PROCEDURE — 99024 POSTOP FOLLOW-UP VISIT: CPT | Performed by: OBSTETRICS & GYNECOLOGY

## 2019-03-05 ASSESSMENT — ENCOUNTER SYMPTOMS
ABDOMINAL PAIN: 0
ABDOMINAL DISTENTION: 0
NAUSEA: 0
DIARRHEA: 0
SHORTNESS OF BREATH: 0
VOMITING: 0
CONSTIPATION: 1

## 2019-03-15 ENCOUNTER — OFFICE VISIT (OUTPATIENT)
Dept: OBGYN CLINIC | Age: 39
End: 2019-03-15

## 2019-03-15 VITALS
HEART RATE: 99 BPM | DIASTOLIC BLOOD PRESSURE: 68 MMHG | BODY MASS INDEX: 24.5 KG/M2 | WEIGHT: 147.2 LBS | TEMPERATURE: 99.5 F | SYSTOLIC BLOOD PRESSURE: 104 MMHG

## 2019-03-15 DIAGNOSIS — Z09 POSTOP CHECK: Primary | ICD-10-CM

## 2019-03-15 DIAGNOSIS — Z98.890 S/P ROBOT-ASSISTED SURGICAL PROCEDURE: ICD-10-CM

## 2019-03-15 DIAGNOSIS — N99.820 POSTOPERATIVE VAGINAL BLEEDING FOLLOWING GENITOURINARY PROCEDURE: ICD-10-CM

## 2019-03-15 PROCEDURE — 99024 POSTOP FOLLOW-UP VISIT: CPT | Performed by: OBSTETRICS & GYNECOLOGY

## 2019-03-17 ASSESSMENT — ENCOUNTER SYMPTOMS
CONSTIPATION: 0
NAUSEA: 0
VOMITING: 0
DIARRHEA: 0
SHORTNESS OF BREATH: 0

## 2019-04-02 ENCOUNTER — OFFICE VISIT (OUTPATIENT)
Dept: OBGYN CLINIC | Age: 39
End: 2019-04-02

## 2019-04-02 VITALS
BODY MASS INDEX: 23.76 KG/M2 | WEIGHT: 142.8 LBS | DIASTOLIC BLOOD PRESSURE: 62 MMHG | TEMPERATURE: 98.2 F | HEART RATE: 100 BPM | SYSTOLIC BLOOD PRESSURE: 97 MMHG

## 2019-04-02 DIAGNOSIS — Z98.890 S/P ROBOT-ASSISTED SURGICAL PROCEDURE: ICD-10-CM

## 2019-04-02 DIAGNOSIS — Z09 POSTOP CHECK: Primary | ICD-10-CM

## 2019-04-02 PROCEDURE — 99024 POSTOP FOLLOW-UP VISIT: CPT | Performed by: OBSTETRICS & GYNECOLOGY

## 2019-04-02 ASSESSMENT — ENCOUNTER SYMPTOMS
SHORTNESS OF BREATH: 0
ABDOMINAL PAIN: 0
DIARRHEA: 0
NAUSEA: 0
CONSTIPATION: 0
VOMITING: 0

## 2019-04-02 NOTE — PROGRESS NOTES
Subjective:      Patient ID: Shayy Lane is a 45 y.o. female. HPI  46 y/o  female presents for postoperative visit. Patient is 6 weeks s/p Davinci robotic total laparoscopic hysterectomy, bilateral salpingectomy, right ovarian cystectomy, removal of IUD secondary to irregular bleeding and menorrhagia. Postop course has been complicated by vaginal bleeding. Bleeding resolved shortly after last postop visit--no bleeding x 2 weeks. Denies vaginal discharge. Last pap smear was 17--normal pap smear, negative testing for high risk HPV. Distant history of abnormal pap smear and HPV. Takes Effexor 37.5 mg daily. Review of Systems   Constitutional: Negative. Negative for activity change, appetite change, chills, fatigue, fever and unexpected weight change. Respiratory: Negative for shortness of breath. Cardiovascular: Negative for chest pain. Gastrointestinal: Negative for abdominal pain, constipation, diarrhea, nausea and vomiting. Genitourinary: Negative for difficulty urinating, dysuria, hematuria, pelvic pain, vaginal bleeding, vaginal discharge and vaginal pain. Psychiatric/Behavioral: Negative. Objective:   Physical Exam   Constitutional: She is oriented to person, place, and time. She appears well-developed and well-nourished. No distress. Abdominal: Soft. She exhibits no distension. There is no tenderness. There is no guarding. Incisions clean dry intact. Well healed. Neurological: She is alert and oriented to person, place, and time. Skin: Skin is warm and dry. She is not diaphoretic. Psychiatric: She has a normal mood and affect. Her behavior is normal. Judgment and thought content normal.   Nursing note and vitals reviewed.   2019 12:43 PM - Vero Tidwell Incoming Lab Results From Soft (Epic Adt)     Narrative   Performed by: [facility]                                   [address]                                   [address] [facility], [facility] [facility]                                       Fax [phone]   [phone]  Department of Pathology  FINAL SURGICAL PATHOLOGY REPORT  Patient Name: Juan Daniel Porter           Accession No:  [id number]   Age Sex:   1980    38 Y / F      Location:      DIS 01  Account No:   [de-identified]                 Collected:     2019  Med Rec No:    [id number]                Received:      2019  Attend Phys:   Alma Burris DO        Completed:     2019  Perform Phys: Alam Burris DO          Technical processing at [facility]., [address]  Phone [phone]    FINAL DIAGNOSIS:       A. Right ovary, cyst wall, biopsy:       - Scant portion of ovarian type stroma; negative for malignancy,         endometrial-type glands or cyst lining cells.       B. Uterus, cervix and bilateral fallopian tubes; hysterectomy,       bilateral salpingectomy:         Cervix:         -   High-grade squamous intraepithelial lesion (HSIL/RICA-2)         involving the cervical transformation zone.         -   Cervical resection margin negative for dysplasia.        -   Acute and chronic cervicitis.        Endomyometrium (111 grams):         -   Proliferative endometrium; negative for hyperplasia, atypia or         malignancy.        Bilateral Fallopian Tubes:         -   No significant pathologic change. COMMENT:    The entire cervix was submitted for evaluation.  Focal HSIL  is identified histologically at the cervical transformation zone. Immunohistochemical staining was performed showing diffuse expression for  p16 and increased Ki-67 expression into the middle third of the squamous  epithelium in the area histologically suspicious for HSIL.  The  immunohistochemical findings support the histologic impression.   ROCJO/ROCJO      Preoperative Diagnosis:  Menorrhagia  Postoperative o'clock cervix  B11-B12:  remainder of 3 o'clock to 6 o'clock posterior cervix  B13-B14:  remainder of 6 o'clock to 9 o'clock  B15-B16:  remainder of 9 o'clock to 12 o'clock cervix CLAUDIAN/JPKI    MICROSCOPIC DESCRIPTION: Microscopic examination performed. All  immunoperoxidase testing was performed using individual antibodies on  separate slides. No antibody cocktails were used on performance of these  studies.                 ANALYTE SPECIFIC REAGENT (ASR) DISCLAIMER    The use of one or more reagents in the above mentioned tests is regulated  as an analyte specific reagent (ASR). These tests were developed and  their performance characteristics determined by the clinical Laboratories  of [facility]. They have not been cleared or approved by the  U.S. Food and Drug Administration (FDA). The FDA has determined that such  clearance or approval is not necessary. CPT: X5199375 X1   I5214523 X1   C091530 X1   I8732355 X1    Case signed out at [facility], [address] [facility], [address] [place] [address]  Technical processing at [facility], [address] [place] [address]  Phone [phone]         Assessment:       Diagnosis Orders   1. Postop check     2. S/P robot-assisted surgical procedure             Plan:      Postop precautions  Follow up prn and 1 year.           Navneet Camarena DO

## 2019-06-11 RX ORDER — VENLAFAXINE HYDROCHLORIDE 37.5 MG/1
CAPSULE, EXTENDED RELEASE ORAL
Qty: 90 CAPSULE | Refills: 2 | Status: SHIPPED | OUTPATIENT
Start: 2019-06-11 | End: 2020-04-03 | Stop reason: SDUPTHER

## 2019-06-11 NOTE — TELEPHONE ENCOUNTER
Rosioas Bailey [phone] (home) [phone] (work)   is requesting refill(s) of medication effexor to preferred pharmacy [facility]     Last OV 2/21/19 (pertaining to medication)   Last refill 11/14/18 (per medication requested)  Next office visit scheduled or attempted No  Date   If No, reason         venlafaxine

## 2019-12-17 ENCOUNTER — HOSPITAL ENCOUNTER (OUTPATIENT)
Dept: ULTRASOUND IMAGING | Age: 39
Discharge: HOME | End: 2019-12-17
Payer: MEDICAID

## 2019-12-17 DIAGNOSIS — N28.1 ACQUIRED CYST OF KIDNEY: ICD-10-CM

## 2019-12-17 DIAGNOSIS — Z87.440 HISTORY OF URINARY TRACT INFECTION: ICD-10-CM

## 2019-12-17 PROCEDURE — 76770 US EXAM ABDO BACK WALL COMP: CPT

## 2019-12-26 ENCOUNTER — PRE-PROCEDURE TELEPHONE (OUTPATIENT)
Dept: INTERVENTIONAL RADIOLOGY/VASCULAR | Age: 39
End: 2019-12-26

## 2019-12-31 ENCOUNTER — HOSPITAL ENCOUNTER (OUTPATIENT)
Dept: INTERVENTIONAL RADIOLOGY/VASCULAR | Age: 39
Discharge: HOME | End: 2019-12-31
Payer: MEDICAID

## 2020-04-02 ENCOUNTER — OFFICE VISIT (OUTPATIENT)
Dept: PRIMARY CARE CLINIC | Age: 40
End: 2020-04-02
Payer: MEDICAID

## 2020-04-02 ENCOUNTER — TELEPHONE (OUTPATIENT)
Dept: FAMILY MEDICINE CLINIC | Age: 40
End: 2020-04-02

## 2020-04-02 VITALS — HEART RATE: 95 BPM | TEMPERATURE: 99.7 F | OXYGEN SATURATION: 99 %

## 2020-04-02 LAB
INFLUENZA A ANTIBODY: NEGATIVE
INFLUENZA B ANTIBODY: NEGATIVE

## 2020-04-02 PROCEDURE — 99212 OFFICE O/P EST SF 10 MIN: CPT | Performed by: NURSE PRACTITIONER

## 2020-04-02 PROCEDURE — 1036F TOBACCO NON-USER: CPT | Performed by: NURSE PRACTITIONER

## 2020-04-02 PROCEDURE — G8428 CUR MEDS NOT DOCUMENT: HCPCS | Performed by: NURSE PRACTITIONER

## 2020-04-02 PROCEDURE — G8420 CALC BMI NORM PARAMETERS: HCPCS | Performed by: NURSE PRACTITIONER

## 2020-04-02 PROCEDURE — 87804 INFLUENZA ASSAY W/OPTIC: CPT | Performed by: NURSE PRACTITIONER

## 2020-04-02 RX ORDER — VENLAFAXINE HYDROCHLORIDE 37.5 MG/1
CAPSULE, EXTENDED RELEASE ORAL
Qty: 30 CAPSULE | Refills: 0 | Status: CANCELLED | OUTPATIENT
Start: 2020-04-02

## 2020-04-02 NOTE — PATIENT INSTRUCTIONS
Preventing the Spread of Coronavirus Disease 2019 in Homes and Residential Communities   For the most recent information go to [email or web address]    Prevention steps for People with confirmed or suspected COVID-19 (including persons under investigation) who do not need to be hospitalized  and   People with confirmed COVID-19 who were hospitalized and determined to be medically stable to go home    Your healthcare provider and public health staff will evaluate whether you can be cared for at home. If it is determined that you do not need to be hospitalized and can be isolated at home, you will be monitored by staff from your local or state health department. You should follow the prevention steps below until a healthcare provider or local or state health department says you can return to your normal activities. Stay home except to get medical care  People who are mildly ill with COVID-19 are able to isolate at home during their illness. You should restrict activities outside your home, except for getting medical care. Do not go to work, school, or public areas. Avoid using public transportation, ride-sharing, or taxis. Separate yourself from other people and animals in your home  People: As much as possible, you should stay in a specific room and away from other people in your home. Also, you should use a separate bathroom, if available. Animals: You should restrict contact with pets and other animals while you are sick with COVID-19, just like you would around other people. Although there have not been reports of pets or other animals becoming sick with COVID-19, it is still recommended that people sick with COVID-19 limit contact with animals until more information is known about the virus. When possible, have another member of your household care for your animals while you are sick.  If you are sick with COVID-19, avoid contact with your pet, including

## 2020-04-02 NOTE — RESULT ENCOUNTER NOTE
Influenza testing is negative  Continue to monitor symptoms and treat as needed  Patient notified of results

## 2020-04-03 ENCOUNTER — E-VISIT (OUTPATIENT)
Dept: FAMILY MEDICINE CLINIC | Age: 40
End: 2020-04-03
Payer: MEDICAID

## 2020-04-03 PROBLEM — F33.0 MILD EPISODE OF RECURRENT MAJOR DEPRESSIVE DISORDER (HCC): Status: ACTIVE | Noted: 2020-04-03

## 2020-04-03 PROBLEM — F41.9 ANXIETY: Status: ACTIVE | Noted: 2020-04-03

## 2020-04-03 PROCEDURE — 99421 OL DIG E/M SVC 5-10 MIN: CPT | Performed by: FAMILY MEDICINE

## 2020-04-03 RX ORDER — VENLAFAXINE HYDROCHLORIDE 37.5 MG/1
CAPSULE, EXTENDED RELEASE ORAL
Qty: 90 CAPSULE | Refills: 2 | Status: SHIPPED | OUTPATIENT
Start: 2020-04-03 | End: 2020-12-14 | Stop reason: SDUPTHER

## 2020-04-03 ASSESSMENT — PATIENT HEALTH QUESTIONNAIRE - PHQ9
7. TROUBLE CONCENTRATING ON THINGS, SUCH AS READING THE NEWSPAPER OR WATCHING TELEVISION: 0
4. FEELING TIRED OR HAVING LITTLE ENERGY: 1
4. FEELING TIRED OR HAVING LITTLE ENERGY: SEVERAL DAYS
6. FEELING BAD ABOUT YOURSELF - OR THAT YOU ARE A FAILURE OR HAVE LET YOURSELF OR YOUR FAMILY DOWN: 0
8. MOVING OR SPEAKING SO SLOWLY THAT OTHER PEOPLE COULD HAVE NOTICED. OR THE OPPOSITE - BEING SO FIDGETY OR RESTLESS THAT YOU HAVE BEEN MOVING AROUND A LOT MORE THAN USUAL: NOT AT ALL
SUM OF ALL RESPONSES TO PHQ QUESTIONS 1-9: 5
1. LITTLE INTEREST OR PLEASURE IN DOING THINGS: 1
5. POOR APPETITE OR OVEREATING: SEVERAL DAYS
9. THOUGHTS THAT YOU WOULD BE BETTER OFF DEAD, OR OF HURTING YOURSELF: 0
SUM OF ALL RESPONSES TO PHQ9 QUESTIONS 1 & 2: 2
2. FEELING DOWN, DEPRESSED OR HOPELESS: SEVERAL DAYS
7. TROUBLE CONCENTRATING ON THINGS, SUCH AS READING THE NEWSPAPER OR WATCHING TELEVISION: NOT AT ALL
1. LITTLE INTEREST OR PLEASURE IN DOING THINGS: SEVERAL DAYS
6. FEELING BAD ABOUT YOURSELF - OR THAT YOU ARE A FAILURE OR HAVE LET YOURSELF OR YOUR FAMILY DOWN: NOT AT ALL
9. THOUGHTS THAT YOU WOULD BE BETTER OFF DEAD, OR OF HURTING YOURSELF: NOT AT ALL
8. MOVING OR SPEAKING SO SLOWLY THAT OTHER PEOPLE COULD HAVE NOTICED. OR THE OPPOSITE, BEING SO FIGETY OR RESTLESS THAT YOU HAVE BEEN MOVING AROUND A LOT MORE THAN USUAL: 0
2. FEELING DOWN, DEPRESSED OR HOPELESS: 1
SUM OF ALL RESPONSES TO PHQ QUESTIONS 1-9: 4
10. IF YOU CHECKED OFF ANY PROBLEMS, HOW DIFFICULT HAVE THESE PROBLEMS MADE IT FOR YOU TO DO YOUR WORK, TAKE CARE OF THINGS AT HOME, OR GET ALONG WITH OTHER PEOPLE: NOT DIFFICULT AT ALL
5. POOR APPETITE OR OVEREATING: 1
SUM OF ALL RESPONSES TO PHQ QUESTIONS 1-9: 4
3. TROUBLE FALLING OR STAYING ASLEEP: SEVERAL DAYS

## 2020-04-03 ASSESSMENT — PATIENT HEALTH QUESTIONNAIRE - GENERAL
SINCE YOUR LAST VISIT, HAVE YOU EXPERIENCED EPISODES OF FAINTING OR NEAR FAINTING: N
HAVE YOU BEEN EXPERIENCING ABDOMINAL DISCOMFORT, NAUSEA OR CHANGES IN YOUR BOWEL PATTERN: N
DO YOU HAVE A FASTER HEART RATE THAN USUAL, DOES YOUR HEART RATE FEEL IRREGULAR OR DO YOU FEEL LIKE YOUR HEART IS POUNDING AT REST: N
HAVE YOU BEEN EXPERIENCING VIVID DREAMS OR NIGHTMARES THAT INTERFERE WITH YOUR SLEEP: N
HAVE YOU BEEN EXPERIENCING NEW OR WORSENING DIFFICULTY WITH URINATION OR CHANGE IN URINARY PATTERN: N
HAVE YOU BEEN EXPERIENCING HALLUCINATIONS OR CONFUSION: N
HAVE YOU BEEN EXPERIENCING NEW OR WORSENING PROBLEMS WITH YOUR SEXUAL FUNCTION: N
HAVE YOU BEEN EXPERIENCING AN UNUSUALLY DRY MOUTH: N
DO YOU HAVE ANY NEW RASHES OR UNEXPLAINED ITCHING OR SWELLING: N
HAVE YOU BEEN EXPERIENCING NEW OR WORSENING VISUAL CHANGES: N
HAVE YOU BEEN EXPERIENCING NEW OR WORSENING MUSCLE TWITCHING, SHAKINESS, OR OTHER UNUSUAL CHANGES IN YOUR MUSCLE MOVEMENT: N
HAVE YOU BEEN EXPERIENCING INVOLUNTARY WEIGHT GAIN OR LOSS: N
HAVE YOU BEEN EXPERIENCING UNEXPLAINED HIGH FEVERS OR EXCESSIVE SWEATING: N
HAVE YOU BEEN EXPERIENCING VERY LOW OR VERY HIGH MOODS: N
HAVE YOU BEEN EXPERIENCING LIGHT HEADEDNESS, WOOZINESS, OR DIZZINESS, ESPECIALLY UPON STANDING FROM SITTING OR LYING POSITIONS: N
HAVE YOU BEEN EXPERIENCING RACING THOUGHTS OR IMPULSIVE BEHAVIOR: N
HAVE YOU BEEN EXPERIENCING NEW OR WORSENING HEADACHES: N

## 2020-04-03 ASSESSMENT — ANXIETY QUESTIONNAIRES
6. BECOMING EASILY ANNOYED OR IRRITABLE: 1-SEVERAL DAYS
1. FEELING NERVOUS, ANXIOUS, OR ON EDGE: SEVERAL DAYS
3. WORRYING TOO MUCH ABOUT DIFFERENT THINGS: NOT AT ALL
4. TROUBLE RELAXING: 1-SEVERAL DAYS
4. TROUBLE RELAXING: SEVERAL DAYS
2. NOT BEING ABLE TO STOP OR CONTROL WORRYING: SEVERAL DAYS
6. BECOMING EASILY ANNOYED OR IRRITABLE: SEVERAL DAYS
5. BEING SO RESTLESS THAT IT IS HARD TO SIT STILL: 1-SEVERAL DAYS
1. FEELING NERVOUS, ANXIOUS, OR ON EDGE: 1-SEVERAL DAYS
7. FEELING AFRAID AS IF SOMETHING AWFUL MIGHT HAPPEN: 1-SEVERAL DAYS
2. NOT BEING ABLE TO STOP OR CONTROL WORRYING: 1-SEVERAL DAYS
7. FEELING AFRAID AS IF SOMETHING AWFUL MIGHT HAPPEN: SEVERAL DAYS
5. BEING SO RESTLESS THAT IT IS HARD TO SIT STILL: SEVERAL DAYS
GAD7 TOTAL SCORE: 6
GAD7 TOTAL SCORE: 6
3. WORRYING TOO MUCH ABOUT DIFFERENT THINGS: 0-NOT AT ALL

## 2020-04-03 NOTE — PROGRESS NOTES
HPI: per patient's questionnaire    EXAM: not applicable    Diagnoses and all orders for this visit:    1. Mild episode of recurrent major depressive disorder (HCC)  Improving   - venlafaxine (EFFEXOR XR) 37.5 MG extended release capsule; TAKE 1 CAPSULE BY MOUTH EVERY DAY  Dispense: 90 capsule; Refill: 2    2. Anxiety  Improving   - venlafaxine (EFFEXOR XR) 37.5 MG extended release capsule; TAKE 1 CAPSULE BY MOUTH EVERY DAY  Dispense: 90 capsule; Refill: 2      Patient was advised to contact PCP if symptoms worsen or failing to change as expected    5-10 minutes were spent on the digital evaluation and management of this patient.

## 2020-04-08 ENCOUNTER — TELEPHONE (OUTPATIENT)
Dept: PRIMARY CARE CLINIC | Age: 40
End: 2020-04-08

## 2020-08-20 ENCOUNTER — OFFICE VISIT (OUTPATIENT)
Dept: FAMILY MEDICINE CLINIC | Age: 40
End: 2020-08-20
Payer: COMMERCIAL

## 2020-08-20 VITALS
HEIGHT: 65 IN | DIASTOLIC BLOOD PRESSURE: 84 MMHG | OXYGEN SATURATION: 98 % | BODY MASS INDEX: 23.32 KG/M2 | HEART RATE: 76 BPM | SYSTOLIC BLOOD PRESSURE: 118 MMHG | WEIGHT: 140 LBS | TEMPERATURE: 97.2 F

## 2020-08-20 PROBLEM — F10.10 ALCOHOL ABUSE: Status: ACTIVE | Noted: 2020-08-20

## 2020-08-20 PROBLEM — F43.23 ADJUSTMENT REACTION WITH ANXIETY AND DEPRESSION: Status: ACTIVE | Noted: 2020-08-20

## 2020-08-20 LAB
A/G RATIO: 2 (ref 1.1–2.2)
ALBUMIN SERPL-MCNC: 4.6 G/DL (ref 3.4–5)
ALP BLD-CCNC: 53 U/L (ref 40–129)
ALT SERPL-CCNC: 17 U/L (ref 10–40)
ANION GAP SERPL CALCULATED.3IONS-SCNC: 14 MMOL/L (ref 3–16)
AST SERPL-CCNC: 19 U/L (ref 15–37)
BILIRUB SERPL-MCNC: <0.2 MG/DL (ref 0–1)
BUN BLDV-MCNC: 10 MG/DL (ref 7–20)
CALCIUM SERPL-MCNC: 9.3 MG/DL (ref 8.3–10.6)
CHLORIDE BLD-SCNC: 103 MMOL/L (ref 99–110)
CHOLESTEROL, TOTAL: 265 MG/DL (ref 0–199)
CO2: 24 MMOL/L (ref 21–32)
CREAT SERPL-MCNC: 0.8 MG/DL (ref 0.6–1.1)
GFR AFRICAN AMERICAN: >60
GFR NON-AFRICAN AMERICAN: >60
GLOBULIN: 2.3 G/DL
GLUCOSE BLD-MCNC: 85 MG/DL (ref 70–99)
HDLC SERPL-MCNC: 85 MG/DL (ref 40–60)
LDL CHOLESTEROL CALCULATED: 171 MG/DL
POTASSIUM SERPL-SCNC: 4.2 MMOL/L (ref 3.5–5.1)
SODIUM BLD-SCNC: 141 MMOL/L (ref 136–145)
TOTAL PROTEIN: 6.9 G/DL (ref 6.4–8.2)
TRIGL SERPL-MCNC: 47 MG/DL (ref 0–150)
VLDLC SERPL CALC-MCNC: 9 MG/DL

## 2020-08-20 PROCEDURE — 36415 COLL VENOUS BLD VENIPUNCTURE: CPT | Performed by: FAMILY MEDICINE

## 2020-08-20 PROCEDURE — 99395 PREV VISIT EST AGE 18-39: CPT | Performed by: FAMILY MEDICINE

## 2020-08-20 RX ORDER — NALTREXONE HYDROCHLORIDE 50 MG/1
TABLET, FILM COATED ORAL
Status: ON HOLD | COMMUNITY
Start: 2020-08-11 | End: 2021-03-04 | Stop reason: HOSPADM

## 2020-08-20 RX ORDER — BUSPIRONE HYDROCHLORIDE 10 MG/1
TABLET ORAL
COMMUNITY
Start: 2020-08-18 | End: 2021-03-01

## 2020-08-20 SDOH — SOCIAL STABILITY: SOCIAL NETWORK: IN A TYPICAL WEEK, HOW MANY TIMES DO YOU TALK ON THE PHONE WITH FAMILY, FRIENDS, OR NEIGHBORS?: TWICE A WEEK

## 2020-08-20 SDOH — SOCIAL STABILITY: SOCIAL NETWORK
DO YOU BELONG TO ANY CLUBS OR ORGANIZATIONS SUCH AS CHURCH GROUPS UNIONS, FRATERNAL OR ATHLETIC GROUPS, OR SCHOOL GROUPS?: NO

## 2020-08-20 SDOH — HEALTH STABILITY: MENTAL HEALTH
STRESS IS WHEN SOMEONE FEELS TENSE, NERVOUS, ANXIOUS, OR CAN'T SLEEP AT NIGHT BECAUSE THEIR MIND IS TROUBLED. HOW STRESSED ARE YOU?: RATHER MUCH

## 2020-08-20 SDOH — SOCIAL STABILITY: SOCIAL NETWORK: HOW OFTEN DO YOU ATTENT MEETINGS OF THE CLUB OR ORGANIZATION YOU BELONG TO?: NEVER

## 2020-08-20 SDOH — HEALTH STABILITY: PHYSICAL HEALTH: ON AVERAGE, HOW MANY DAYS PER WEEK DO YOU ENGAGE IN MODERATE TO STRENUOUS EXERCISE (LIKE A BRISK WALK)?: 5 DAYS

## 2020-08-20 SDOH — ECONOMIC STABILITY: FOOD INSECURITY: WITHIN THE PAST 12 MONTHS, THE FOOD YOU BOUGHT JUST DIDN'T LAST AND YOU DIDN'T HAVE MONEY TO GET MORE.: NEVER TRUE

## 2020-08-20 SDOH — SOCIAL STABILITY: SOCIAL INSECURITY
WITHIN THE LAST YEAR, HAVE YOU BEEN KICKED, HIT, SLAPPED, OR OTHERWISE PHYSICALLY HURT BY YOUR PARTNER OR EX-PARTNER?: NO

## 2020-08-20 SDOH — SOCIAL STABILITY: SOCIAL INSECURITY: WITHIN THE LAST YEAR, HAVE YOU BEEN AFRAID OF YOUR PARTNER OR EX-PARTNER?: NO

## 2020-08-20 SDOH — HEALTH STABILITY: PHYSICAL HEALTH: ON AVERAGE, HOW MANY MINUTES DO YOU ENGAGE IN EXERCISE AT THIS LEVEL?: 60 MIN

## 2020-08-20 SDOH — SOCIAL STABILITY: SOCIAL NETWORK: ARE YOU MARRIED, WIDOWED, DIVORCED, SEPARATED, NEVER MARRIED, OR LIVING WITH A PARTNER?: MARRIED

## 2020-08-20 SDOH — SOCIAL STABILITY: SOCIAL NETWORK: HOW OFTEN DO YOU GET TOGETHER WITH FRIENDS OR RELATIVES?: TWICE A WEEK

## 2020-08-20 SDOH — ECONOMIC STABILITY: FOOD INSECURITY: WITHIN THE PAST 12 MONTHS, YOU WORRIED THAT YOUR FOOD WOULD RUN OUT BEFORE YOU GOT MONEY TO BUY MORE.: NEVER TRUE

## 2020-08-20 SDOH — ECONOMIC STABILITY: TRANSPORTATION INSECURITY
IN THE PAST 12 MONTHS, HAS THE LACK OF TRANSPORTATION KEPT YOU FROM MEDICAL APPOINTMENTS OR FROM GETTING MEDICATIONS?: NO

## 2020-08-20 SDOH — SOCIAL STABILITY: SOCIAL NETWORK: HOW OFTEN DO YOU ATTEND CHURCH OR RELIGIOUS SERVICES?: NEVER

## 2020-08-20 SDOH — SOCIAL STABILITY: SOCIAL INSECURITY
WITHIN THE LAST YEAR, HAVE TO BEEN RAPED OR FORCED TO HAVE ANY KIND OF SEXUAL ACTIVITY BY YOUR PARTNER OR EX-PARTNER?: NO

## 2020-08-20 SDOH — ECONOMIC STABILITY: TRANSPORTATION INSECURITY
IN THE PAST 12 MONTHS, HAS LACK OF TRANSPORTATION KEPT YOU FROM MEETINGS, WORK, OR FROM GETTING THINGS NEEDED FOR DAILY LIVING?: NO

## 2020-08-20 SDOH — SOCIAL STABILITY: SOCIAL INSECURITY: WITHIN THE LAST YEAR, HAVE YOU BEEN HUMILIATED OR EMOTIONALLY ABUSED IN OTHER WAYS BY YOUR PARTNER OR EX-PARTNER?: NO

## 2020-08-20 SDOH — ECONOMIC STABILITY: INCOME INSECURITY: HOW HARD IS IT FOR YOU TO PAY FOR THE VERY BASICS LIKE FOOD, HOUSING, MEDICAL CARE, AND HEATING?: NOT HARD AT ALL

## 2020-08-20 ASSESSMENT — ENCOUNTER SYMPTOMS
SHORTNESS OF BREATH: 0
BLOOD IN STOOL: 0
RHINORRHEA: 0
ABDOMINAL PAIN: 0
WHEEZING: 0
VOMITING: 0
EYE DISCHARGE: 0
BACK PAIN: 0
NAUSEA: 0
DIARRHEA: 0
COLOR CHANGE: 0
EYE PAIN: 0
SORE THROAT: 0
CONSTIPATION: 0

## 2020-08-20 ASSESSMENT — ANXIETY QUESTIONNAIRES
7. FEELING AFRAID AS IF SOMETHING AWFUL MIGHT HAPPEN: 0-NOT AT ALL
6. BECOMING EASILY ANNOYED OR IRRITABLE: 1-SEVERAL DAYS
3. WORRYING TOO MUCH ABOUT DIFFERENT THINGS: 1-SEVERAL DAYS
5. BEING SO RESTLESS THAT IT IS HARD TO SIT STILL: 1-SEVERAL DAYS
GAD7 TOTAL SCORE: 8
2. NOT BEING ABLE TO STOP OR CONTROL WORRYING: 1-SEVERAL DAYS
1. FEELING NERVOUS, ANXIOUS, OR ON EDGE: 2-OVER HALF THE DAYS
4. TROUBLE RELAXING: 2-OVER HALF THE DAYS

## 2020-08-20 ASSESSMENT — PATIENT HEALTH QUESTIONNAIRE - PHQ9
5. POOR APPETITE OR OVEREATING: 0
SUM OF ALL RESPONSES TO PHQ QUESTIONS 1-9: 1
SUM OF ALL RESPONSES TO PHQ QUESTIONS 1-9: 1
8. MOVING OR SPEAKING SO SLOWLY THAT OTHER PEOPLE COULD HAVE NOTICED. OR THE OPPOSITE, BEING SO FIGETY OR RESTLESS THAT YOU HAVE BEEN MOVING AROUND A LOT MORE THAN USUAL: 0
7. TROUBLE CONCENTRATING ON THINGS, SUCH AS READING THE NEWSPAPER OR WATCHING TELEVISION: 0
3. TROUBLE FALLING OR STAYING ASLEEP: 0
1. LITTLE INTEREST OR PLEASURE IN DOING THINGS: 0
10. IF YOU CHECKED OFF ANY PROBLEMS, HOW DIFFICULT HAVE THESE PROBLEMS MADE IT FOR YOU TO DO YOUR WORK, TAKE CARE OF THINGS AT HOME, OR GET ALONG WITH OTHER PEOPLE: 1
4. FEELING TIRED OR HAVING LITTLE ENERGY: 0
9. THOUGHTS THAT YOU WOULD BE BETTER OFF DEAD, OR OF HURTING YOURSELF: 0
6. FEELING BAD ABOUT YOURSELF - OR THAT YOU ARE A FAILURE OR HAVE LET YOURSELF OR YOUR FAMILY DOWN: 1

## 2020-08-20 NOTE — PROGRESS NOTES
SUBJECTIVE:  Chief Complaint   Patient presents with    Annual Exam     Pt is here for physical, FBW, and NTP    Established New Doctor    Alcohol Problem    Anxiety    Health Maintenance     HPI    Dania Higgins is a 44 y. o.female that presents today for establish care.    -History of postpartum depression, anxiety, alcohol misuse. Alcohol Abuse:  Started going to [facility] for treatment   Started treatment about 2 weeks ago  Pre-COVID-19 2 glasses of wine at night. 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 Takes Effexor 37.5 mg XR for anxiety everyday.  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  Naltrexone to help with cravings    Health Maintenance:  Last pap smear 6/21/2017 pap smear with HPV co testing  Hysterectomy 2/21/2019; no longer needing pap smear  Had chickenpox as child  Last Tdap 2011, due 2021  Due for mammogram after 40th bday; order today    Past Medical History:   Diagnosis Date    Abnormal Pap smear of cervix     Alcohol abuse 8/20/2020    Anemia     Anxiety 4/3/2020    Asthma 1/12/2011    Breast disorder     lumps removed    Post partum depression     Renal cyst      Past Surgical History:   Procedure Laterality Date    BREAST SURGERY  1/2012    benign tumor removed left breast    EYE SURGERY Bilateral 2002    lasik    HYSTERECTOMY ABDOMINAL N/A 2/21/2019    DAVINCI ROBOTIC TOTAL LAPAROSCOPIC HYSTERECTOMY WITH BILATERAL SALPINGECTOMY,  WITH REMOVAL OF INTRAUTERINE DEVICE, AND RIGHT OVARIAN CYSTECTOMY                **LATEX SENSITIVE**  CPT CODE - 22135 performed by Bora Baker DO at [address]  12/13/2013    LITHOTRIPSY  1/2014    TONSILLECTOMY      T&A       Family History   Problem Relation Age of Onset    High Blood Pressure Mother     High Blood Pressure Maternal Grandmother  Cancer Maternal Grandmother         colon    Heart Disease Maternal Grandfather     No Known Problems Father     No Known Problems Brother     No Known Problems Paternal Grandfather     No Known Problems Paternal Grandmother        Current Outpatient Medications   Medication Sig Dispense Refill    busPIRone (BUSPAR) 10 MG tablet TK 1 T PO BID UTD      naltrexone (DEPADE) 50 MG tablet       venlafaxine (EFFEXOR XR) 37.5 MG extended release capsule TAKE 1 CAPSULE BY MOUTH EVERY DAY 90 capsule 2     No current facility-administered medications for this visit. Allergies   Allergen Reactions    Latex Rash    Penicillins Anaphylaxis     Tolerated keflex and rocephin 1/2014    Macrobid [Nitrofurantoin] Hives and Itching    Vancomycin Itching    Ciprofloxacin Rash    Other Swelling and Rash     Cat gut suture    Sulfa Antibiotics Rash       Social History     Socioeconomic History    Marital status:      Spouse name: Keli Baptiste Number of children: 3    Years of education: 16    Highest education level: Bachelor's degree (e.g., BA, AB, BS)   Occupational History    Occupation: [employer]     Comment: brother is Dr. Mak Hathaway strain: Not hard at all   [place] insecurity     Worry: Never true     Inability: Never true   [employer] [place] needs     Medical: No     Non-medical: No   Tobacco Use    Smoking status: Never Smoker    Smokeless tobacco: Never Used    Tobacco comment: not applicable   Substance and Sexual Activity    Alcohol use:  Yes     Alcohol/week: 4.0 standard drinks     Types: 4 Glasses of wine per week     Comment: mild alcoholic before COVID, entered treatment; always [place]    Drug use: No    Sexual activity: Yes     Partners: Male   Lifestyle    Physical activity     Days per week: 5 days     Minutes per session: 60 min    Stress: Rather much   Relationships    Social connections     Talks on phone: Twice a week Gets together: Twice a week     Attends Muslim service: Never     Active member of club or organization: No     Attends meetings of clubs or organizations: Never     Relationship status:     Intimate partner violence     Fear of current or ex partner: No     Emotionally abused: No     Physically abused: No     Forced sexual activity: No   Other Topics Concern    Not on file   Social History Narrative    Parents, brother spouses        Michelle Sickle 39    3 children    17 y/o Jerri Graft 15 y/o son Joe Olson    5 y.o Vida Cotter 4th Grade [employer]             There is no immunization history on file for this patient. Past medical, surgical, and social history reviewed and updated. Medications, immunizations, and allergies reviewed and updated     Review of Systems   Constitutional: Negative for chills, fever and unexpected weight change. HENT: Negative for congestion, rhinorrhea and sore throat. Eyes: Negative for pain, discharge and visual disturbance. Respiratory: Negative for shortness of breath and wheezing. Cardiovascular: Negative for chest pain and leg swelling. Gastrointestinal: Negative for abdominal pain, blood in stool, constipation, diarrhea, nausea and vomiting. Endocrine: Negative for polyuria. Genitourinary: Negative for dysuria and flank pain. Musculoskeletal: Negative for arthralgias, back pain and neck pain. Skin: Negative for color change, rash and wound. Allergic/Immunologic: Negative for environmental allergies and food allergies. Neurological: Negative for dizziness, syncope, speech difficulty, weakness, light-headedness and headaches. Hematological: Negative for adenopathy. Does not bruise/bleed easily. Psychiatric/Behavioral: Negative for dysphoric mood and sleep disturbance. The patient is nervous/anxious (anxiety and stress).       OBJECTIVE:    /84 (Site: Right Upper Arm, Position: Sitting)   Pulse 76   Temp 97.2 °F (36.2 °C)   Ht 5' 5\" (1.651 m)   Wt 140 lb (63.5 kg)   LMP 02/04/2019 (Exact Date)   SpO2 98%   BMI 23.30 kg/m²     Physical Exam  Constitutional:       General: She is not in acute distress. Appearance: She is well-developed. HENT:      Head: Normocephalic and atraumatic. Right Ear: Tympanic membrane normal.      Left Ear: Tympanic membrane normal.      Nose: Nose normal. No rhinorrhea. Mouth/Throat:      Pharynx: Uvula midline. Eyes:      Pupils: Pupils are equal, round, and reactive to light. Neck:      Trachea: No tracheal deviation. Cardiovascular:      Rate and Rhythm: Normal rate and regular rhythm. Heart sounds: Normal heart sounds. No murmur. No friction rub. No gallop. Pulmonary:      Effort: Pulmonary effort is normal. No respiratory distress. Breath sounds: Normal breath sounds. No wheezing or rales. Abdominal:      General: Bowel sounds are normal. There is no distension. Palpations: Abdomen is soft. Tenderness: There is no abdominal tenderness. There is no rebound. Musculoskeletal: Normal range of motion. General: No tenderness. Lymphadenopathy:      Cervical: No cervical adenopathy. Skin:     General: Skin is warm and dry. Findings: No erythema or rash. Neurological:      Mental Status: She is alert and oriented to person, place, and time. Cranial Nerves: No cranial nerve deficit. Deep Tendon Reflexes:      Reflex Scores:       Tricep reflexes are 2+ on the right side and 2+ on the left side. Brachioradialis reflexes are 2+ on the left side. Patellar reflexes are 2+ on the right side. Psychiatric:         Speech: Speech normal.         Thought Content: Thought content does not include homicidal or suicidal ideation. ASSESSMENT/PLAN:  Erin Romero is 43 y/o female here today to establish care/annual exam.  Recently entered outpatient tx for EtOH abuse.   Taking bupsar and natrexone, has been on Effexor for mood for

## 2020-08-20 NOTE — PATIENT INSTRUCTIONS
-continue effexor and buspar for now, naltrexone when needed  -continue alcohol cessation and to go to [facility]  -get mammogram and blood work  -see dermatology  -follow up with gynecology once a year  -see me in 1 year, sooner if need be  Patient Education        Well Visit, Ages 25 to 48: Care Instructions  Your Care Instructions     Physical exams can help you stay healthy. Your doctor has checked your overall health and may have suggested ways to take good care of yourself. He or she also may have recommended tests. At home, you can help prevent illness with healthy eating, regular exercise, and other steps. Follow-up care is a key part of your treatment and safety. Be sure to make and go to all appointments, and call your doctor if you are having problems. It's also a good idea to know your test results and keep a list of the medicines you take. How can you care for yourself at home? · Reach and stay at a healthy weight. This will lower your risk for many problems, such as obesity, diabetes, heart disease, and high blood pressure. · Get at least 30 minutes of physical activity on most days of the week. Walking is a good choice. You also may want to do other activities, such as running, swimming, cycling, or playing tennis or team sports. Discuss any changes in your exercise program with your doctor. · Do not smoke or allow others to smoke around you. If you need help quitting, talk to your doctor about stop-smoking programs and medicines. These can increase your chances of quitting for good. · Talk to your doctor about whether you have any risk factors for sexually transmitted infections (STIs). Having one sex partner (who does not have STIs and does not have sex with anyone else) is a good way to avoid these infections. · Use birth control if you do not want to have children at this time. Talk with your doctor about the choices available and what might be best for you.   · Protect your skin from too much sun. When you're outdoors from 10 a.m. to 4 p.m., stay in the shade or cover up with clothing and a hat with a wide brim. Wear sunglasses that block UV rays. Even when it's cloudy, put broad-spectrum sunscreen (SPF 30 or higher) on any exposed skin. · See a dentist one or two times a year for checkups and to have your teeth cleaned. · Wear a seat belt in the car. Follow your doctor's advice about when to have certain tests. These tests can spot problems early. For everyone  · Cholesterol. Have the fat (cholesterol) in your blood tested after age 21. Your doctor will tell you how often to have this done based on your age, family history, or other things that can increase your risk for heart disease. · Blood pressure. Have your blood pressure checked during a routine doctor visit. Your doctor will tell you how often to check your blood pressure based on your age, your blood pressure results, and other factors. · Vision. Talk with your doctor about how often to have a glaucoma test.  · Diabetes. Ask your doctor whether you should have tests for diabetes. · Colon cancer. Your risk for colorectal cancer gets higher as you get older. Some experts say that adults should start regular screening at age 48 and stop at age 76. Others say to start before age 48 or continue after age 76. Talk with your doctor about your risk and when to start and stop screening. For women  · Breast exam and mammogram. Talk to your doctor about when you should have a clinical breast exam and a mammogram. Medical experts differ on whether and how often women under 50 should have these tests. Your doctor can help you decide what is right for you. · Cervical cancer screening test and pelvic exam. Begin with a Pap test at age 24. The test often is part of a pelvic exam. Starting at age 27, you may choose to have a Pap test, an HPV test, or both tests at the same time (called co-testing).  Talk with your doctor about how often to have testing. · Tests for sexually transmitted infections (STIs). Ask whether you should have tests for STIs. You may be at risk if you have sex with more than one person, especially if your partners do not wear condoms. For men  · Tests for sexually transmitted infections (STIs). Ask whether you should have tests for STIs. You may be at risk if you have sex with more than one person, especially if you do not wear a condom. · Testicular cancer exam. Ask your doctor whether you should check your testicles regularly. · Prostate exam. Talk to your doctor about whether you should have a blood test (called a PSA test) for prostate cancer. Experts differ on whether and when men should have this test. Some experts suggest it if you are older than 39 and are -American or have a father or brother who got prostate cancer when he was younger than 72. When should you call for help? Watch closely for changes in your health, and be sure to contact your doctor if you have any problems or symptoms that concern you. Where can you learn more? Go to [email or web address] and sign in to your YellowSchedule account. Enter P072 in the Transmit box to learn more about \"Well Visit, Ages 25 to 48: Care Instructions. \"     If you do not have an account, please click on the \"Sign Up Now\" link. Current as of: August 22, 2019               Content Version: 12.5  © 9908-7450 Healthwise, Incorporated. Care instructions adapted under license by [facility] ([facility]). If you have questions about a medical condition or this instruction, always ask your healthcare professional. [place] [address] any warranty or liability for your use of this information.

## 2020-12-14 ENCOUNTER — TELEPHONE (OUTPATIENT)
Dept: FAMILY MEDICINE CLINIC | Age: 40
End: 2020-12-14

## 2020-12-14 ENCOUNTER — OFFICE VISIT (OUTPATIENT)
Dept: PRIMARY CARE CLINIC | Age: 40
End: 2020-12-14
Payer: COMMERCIAL

## 2020-12-14 ENCOUNTER — TELEPHONE (OUTPATIENT)
Dept: PRIMARY CARE CLINIC | Age: 40
End: 2020-12-14

## 2020-12-14 PROCEDURE — 99211 OFF/OP EST MAY X REQ PHY/QHP: CPT | Performed by: NURSE PRACTITIONER

## 2020-12-14 PROCEDURE — G8428 CUR MEDS NOT DOCUMENT: HCPCS | Performed by: NURSE PRACTITIONER

## 2020-12-14 PROCEDURE — G8420 CALC BMI NORM PARAMETERS: HCPCS | Performed by: NURSE PRACTITIONER

## 2020-12-14 RX ORDER — VENLAFAXINE HYDROCHLORIDE 37.5 MG/1
CAPSULE, EXTENDED RELEASE ORAL
Qty: 90 CAPSULE | Refills: 2 | Status: ON HOLD | OUTPATIENT
Start: 2020-12-14 | End: 2021-03-04 | Stop reason: HOSPADM

## 2020-12-14 NOTE — PROGRESS NOTES
Ashleigh Kirk received a viral test for COVID-19. They were educated on isolation and quarantine as appropriate. For any symptoms, they were directed to seek care from their PCP, given contact information to establish with a doctor, directed to an urgent care or the emergency room.

## 2020-12-14 NOTE — PATIENT INSTRUCTIONS

## 2020-12-14 NOTE — TELEPHONE ENCOUNTER
-supportive care, analgesics, antipyretics, hydration liberally (POAL,) saline gargle/rinse, lozenges/honey for cough, rest  -if goes to COVID-clinic we can swab patient for flu/strep but need to know if she made appointment to place orders

## 2020-12-14 NOTE — TELEPHONE ENCOUNTER
Patient is calling needing a refill of venlafaxine (EFFEXOR XR) 37.5 MG extended release capsule  Please advise this will go to [facility] [phone]

## 2020-12-14 NOTE — TELEPHONE ENCOUNTER
Patient called reporting the following symptoms that started yesterday morning: low grade fever (100.9 off and on) post nasal drip ,sore throat, fatigue, dry cough. Gave patient the number to the [facility] and to Dr. Benjamin lockhart. Patient would like to know what she can do about her symptoms: any otc recommendations? Virtual appointment?

## 2020-12-15 LAB — SARS-COV-2, NAA: NOT DETECTED

## 2021-02-23 ENCOUNTER — HOSPITAL ENCOUNTER (OUTPATIENT)
Dept: CT IMAGING | Age: 41
Discharge: HOME | End: 2021-02-23
Payer: COMMERCIAL

## 2021-02-23 ENCOUNTER — HOSPITAL ENCOUNTER (OUTPATIENT)
Age: 41
Discharge: HOME | End: 2021-02-23
Payer: COMMERCIAL

## 2021-02-23 ENCOUNTER — OFFICE VISIT (OUTPATIENT)
Dept: PRIMARY CARE CLINIC | Age: 41
End: 2021-02-23
Payer: COMMERCIAL

## 2021-02-23 VITALS
WEIGHT: 142 LBS | SYSTOLIC BLOOD PRESSURE: 108 MMHG | DIASTOLIC BLOOD PRESSURE: 72 MMHG | OXYGEN SATURATION: 97 % | HEIGHT: 65 IN | BODY MASS INDEX: 23.66 KG/M2 | HEART RATE: 82 BPM | TEMPERATURE: 97.9 F

## 2021-02-23 DIAGNOSIS — Z87.448 HISTORY OF SIMPLE RENAL CYST: ICD-10-CM

## 2021-02-23 DIAGNOSIS — Z87.442 HISTORY OF NEPHROLITHIASIS: ICD-10-CM

## 2021-02-23 DIAGNOSIS — R10.9 RIGHT FLANK PAIN: ICD-10-CM

## 2021-02-23 DIAGNOSIS — R35.0 URINARY FREQUENCY: Primary | ICD-10-CM

## 2021-02-23 DIAGNOSIS — R10.9 ACUTE RIGHT FLANK PAIN: ICD-10-CM

## 2021-02-23 DIAGNOSIS — R35.0 URINARY FREQUENCY: ICD-10-CM

## 2021-02-23 DIAGNOSIS — N20.0 NEPHROLITHIASIS: Primary | ICD-10-CM

## 2021-02-23 LAB
A/G RATIO: 1.4 (ref 1.1–2.2)
ALBUMIN SERPL-MCNC: 4.4 G/DL (ref 3.4–5)
ALP BLD-CCNC: 62 U/L (ref 40–129)
ALT SERPL-CCNC: 22 U/L (ref 10–40)
ANION GAP SERPL CALCULATED.3IONS-SCNC: 11 MMOL/L (ref 3–16)
AST SERPL-CCNC: 20 U/L (ref 15–37)
BASOPHILS ABSOLUTE: 0.1 K/UL (ref 0–0.2)
BASOPHILS RELATIVE PERCENT: 1 %
BILIRUB SERPL-MCNC: <0.2 MG/DL (ref 0–1)
BILIRUBIN, POC: NORMAL
BLOOD URINE, POC: NORMAL
BUN BLDV-MCNC: 9 MG/DL (ref 7–20)
CALCIUM SERPL-MCNC: 9.1 MG/DL (ref 8.3–10.6)
CHLORIDE BLD-SCNC: 98 MMOL/L (ref 99–110)
CLARITY, POC: CLEAR
CO2: 24 MMOL/L (ref 21–32)
COLOR, POC: NORMAL
CREAT SERPL-MCNC: 0.7 MG/DL (ref 0.6–1.1)
EOSINOPHILS ABSOLUTE: 0.3 K/UL (ref 0–0.6)
EOSINOPHILS RELATIVE PERCENT: 4.8 %
GFR AFRICAN AMERICAN: >60
GFR NON-AFRICAN AMERICAN: >60
GLOBULIN: 3.1 G/DL
GLUCOSE BLD-MCNC: 91 MG/DL (ref 70–99)
GLUCOSE URINE, POC: NORMAL
HCT VFR BLD CALC: 40.7 % (ref 36–48)
HEMOGLOBIN: 13.7 G/DL (ref 12–16)
KETONES, POC: NORMAL
LEUKOCYTE EST, POC: NORMAL
LYMPHOCYTES ABSOLUTE: 1.8 K/UL (ref 1–5.1)
LYMPHOCYTES RELATIVE PERCENT: 29.2 %
MCH RBC QN AUTO: 33.8 PG (ref 26–34)
MCHC RBC AUTO-ENTMCNC: 33.6 G/DL (ref 31–36)
MCV RBC AUTO: 100.7 FL (ref 80–100)
MONOCYTES ABSOLUTE: 0.4 K/UL (ref 0–1.3)
MONOCYTES RELATIVE PERCENT: 7.1 %
NEUTROPHILS ABSOLUTE: 3.5 K/UL (ref 1.7–7.7)
NEUTROPHILS RELATIVE PERCENT: 57.9 %
NITRITE, POC: NORMAL
PDW BLD-RTO: 12.6 % (ref 12.4–15.4)
PH, POC: 6.5
PLATELET # BLD: 205 K/UL (ref 135–450)
PMV BLD AUTO: 8.7 FL (ref 5–10.5)
POTASSIUM SERPL-SCNC: 4 MMOL/L (ref 3.5–5.1)
PROTEIN, POC: NORMAL
RBC # BLD: 4.04 M/UL (ref 4–5.2)
SODIUM BLD-SCNC: 133 MMOL/L (ref 136–145)
SPECIFIC GRAVITY, POC: 1
TOTAL PROTEIN: 7.5 G/DL (ref 6.4–8.2)
UROBILINOGEN, POC: 0.2
WBC # BLD: 6.1 K/UL (ref 4–11)

## 2021-02-23 PROCEDURE — 36415 COLL VENOUS BLD VENIPUNCTURE: CPT

## 2021-02-23 PROCEDURE — 80053 COMPREHEN METABOLIC PANEL: CPT

## 2021-02-23 PROCEDURE — 74176 CT ABD & PELVIS W/O CONTRAST: CPT

## 2021-02-23 PROCEDURE — G8484 FLU IMMUNIZE NO ADMIN: HCPCS | Performed by: FAMILY MEDICINE

## 2021-02-23 PROCEDURE — G8420 CALC BMI NORM PARAMETERS: HCPCS | Performed by: FAMILY MEDICINE

## 2021-02-23 PROCEDURE — G8427 DOCREV CUR MEDS BY ELIG CLIN: HCPCS | Performed by: FAMILY MEDICINE

## 2021-02-23 PROCEDURE — 99213 OFFICE O/P EST LOW 20 MIN: CPT | Performed by: FAMILY MEDICINE

## 2021-02-23 PROCEDURE — 1036F TOBACCO NON-USER: CPT | Performed by: FAMILY MEDICINE

## 2021-02-23 PROCEDURE — 87086 URINE CULTURE/COLONY COUNT: CPT

## 2021-02-23 PROCEDURE — 81002 URINALYSIS NONAUTO W/O SCOPE: CPT | Performed by: FAMILY MEDICINE

## 2021-02-23 PROCEDURE — 85025 COMPLETE CBC W/AUTO DIFF WBC: CPT

## 2021-02-23 RX ORDER — CEPHALEXIN 500 MG/1
500 CAPSULE ORAL 2 TIMES DAILY
Qty: 14 CAPSULE | Refills: 0 | Status: SHIPPED | OUTPATIENT
Start: 2021-02-23 | End: 2021-03-01

## 2021-02-23 ASSESSMENT — ENCOUNTER SYMPTOMS
CONSTIPATION: 0
BACK PAIN: 1
TROUBLE SWALLOWING: 0
VOMITING: 0
SHORTNESS OF BREATH: 1
ABDOMINAL PAIN: 0
DIARRHEA: 1
CHEST TIGHTNESS: 0
NAUSEA: 1

## 2021-02-23 NOTE — PATIENT INSTRUCTIONS
Get blood work and CT scan to rule out kidney or ureter issue or stone in your kidneys or bladder tract    Meeker hydration with lemon lime seltzer or water 128 oz or 16 cups a day goal    Await CT results, consider flomax, keflex empirically with close urology follow up    Patient Education        Kidney Stone: Cre Instructions  Your Care Instructions     Kidney stones are formed when salts, minerals, and other substances normally found in the urine clump together. They can be as small as grains of sand or, rarely, as large as golf balls. While the stone is traveling through the ureter, which is the tube that carries urine from the kidney to the bladder, you will probably feel pain. The pain may be mild or very severe. You may also have some blood in your urine. As soon as the stone reaches the bladder, any intense pain should go away. If a stone is too large to pass on its own, you may need a medical procedure to help you pass the stone. The doctor has checked you carefully, but problems can develop later. If you notice any problems or new symptoms, get medical treatment right away. Follow-up care is a key part of your treatment and safety. Be sure to make and go to all appointments, and call your doctor if you are having problems. It's also a good idea to know your test results and keep a list of the medicines you take. How can you care for yourself at home? · Drink plenty of fluids, enough so that your urine is light yellow or clear like water. If you have kidney, heart, or liver disease and have to limit fluids, talk with your doctor before you increase the amount of fluids you drink. · Take pain medicines exactly as directed. Call your doctor if you think you are having a problem with your medicine. ? If the doctor gave you a prescription medicine for pain, take it as prescribed. ? If you are not taking a prescription pain medicine, ask your doctor if you can take an over-the-counter medicine.  Read and follow all instructions on the label. · Your doctor may ask you to strain your urine so that you can collect your kidney stone when it passes. You can use a kitchen strainer or a tea strainer to catch the stone. Store it in a plastic bag until you see your doctor again. Preventing future kidney stones  Some changes in your diet may help prevent kidney stones. Depending on the cause of your stones, your doctor may recommend that you:  · Drink plenty of fluids, enough so that your urine is light yellow or clear like water. If you have kidney, heart, or liver disease and have to limit fluids, talk with your doctor before you increase the amount of fluids you drink. · Limit coffee, tea, and alcohol. Also avoid grapefruit juice. · Do not take more than the recommended daily dose of vitamins C and D.  · Avoid antacids such as Gaviscon, Maalox, Mylanta, or Tums. · Limit the amount of salt (sodium) in your diet. · Eat a balanced diet that is not too high in protein. · Limit foods that are high in a substance called oxalate, which can cause kidney stones. These foods include dark green vegetables, rhubarb, chocolate, wheat bran, nuts, cranberries, and beans. When should you call for help? Call your doctor now or seek immediate medical care if:    · You cannot keep down fluids.     · Your pain gets worse.     · You have a fever or chills.     · You have new or worse pain in your back just below your rib cage (the flank area).     · You have new or more blood in your urine. Watch closely for changes in your health, and be sure to contact your doctor if:    · You do not get better as expected. Where can you learn more? Go to [email or web address] and sign in to your Sportcut account. Enter I111 in the Viagogo box to learn more about \"Kidney Stone: Care Instructions. \"     If you do not have an account, please click on the \"Sign Up Now\" link.   Current as of: April 15, 2020               Content Version: 12.6  © 7382-0300 [employer], Incorporated. Care instructions adapted under license by [facility] ([facility]). If you have questions about a medical condition or this instruction, always ask your healthcare professional. [address] any warranty or liability for your use of this information.

## 2021-02-23 NOTE — PROGRESS NOTES
Chief Complaint   Patient presents with    Urinary Frequency     low back pain, urine frequency, pelvic pain, light headed, nausea    Flank Pain    Abdominal Pain     HPI:  Candice Lares is 37 y/o female here today for urinary frequency/pelvic pain/low back pain. Started 3 days ago. 7 years ago had recurrent UTI. Presents like appendicitis in past  Got 2nd covid vaccine over weekend and got sick  Increased frequency  Lower back pain on right side  Kept getting pain RLQ and under her rib  Some nausea  Not sure per patient, doesn't burn/never has  Frequency of urination. Has had kidney stone in past 7 or 8 years ago.   Has not tried anything for relief  Drinks a lot of water, gallon a day, has kept this up  Pain while trying to sleep last night  Right inguinal area, right flank, under her right rib cage  Pain on 1 to to 10 is a 2 more an annoyance  More uncomfortable at night  Worse at night during bed/while laying down past few days  Chills and aches after 2nd vaccine; no true fever  Feels a little winded, usually can bounce up stairs  FDLMP: had hysterotomy 3 years ago but left her ovaries; has not had torsion of ovaries in past  Appendix still present    Past Medical History:   Diagnosis Date    Abnormal Pap smear of cervix     Alcohol abuse 8/20/2020    Anemia     Anxiety 4/3/2020    Asthma 1/12/2011    Breast disorder     lumps removed    Post partum depression     Renal cyst      Past Surgical History:   Procedure Laterality Date    BREAST SURGERY  1/2012    benign tumor removed left breast    EYE SURGERY Bilateral 2002    lasik    HYSTERECTOMY ABDOMINAL N/A 2/21/2019    DAVINCI ROBOTIC TOTAL LAPAROSCOPIC HYSTERECTOMY WITH BILATERAL SALPINGECTOMY,  WITH REMOVAL OF INTRAUTERINE DEVICE, AND RIGHT OVARIAN CYSTECTOMY                **LATEX SENSITIVE**  CPT CODE - 82334 performed by Ronny Lee DO at [address]  12/13/2013    LITHOTRIPSY  1/2014    TONSILLECTOMY Minutes per session: 60 min    Stress: Rather much   Relationships    Social connections     Talks on phone: Twice a week     Gets together: Twice a week     Attends Zoroastrianism service: Never     Active member of club or organization: No     Attends meetings of clubs or organizations: Never     Relationship status:     Intimate partner violence     Fear of current or ex partner: No     Emotionally abused: No     Physically abused: No     Forced sexual activity: No   Other Topics Concern    Not on file   Social History Narrative    Parents, brother spouses        Heri Perez 39    3 children    17 y/o Malcom Sarmiento 15 y/o son Michelle Salazar    5 y.o Elver Colder 4th Grade [employer]         Family History   Problem Relation Age of Onset    High Blood Pressure Mother     High Blood Pressure Maternal Grandmother     Cancer Maternal Grandmother         colon    Heart Disease Maternal Grandfather     No Known Problems Father     No Known Problems Brother     No Known Problems Paternal Grandfather     No Known Problems Paternal Grandmother        Review of Systems   Constitutional: Positive for chills and diaphoresis. Negative for activity change, appetite change, fatigue and fever. HENT: Negative for congestion, hearing loss and trouble swallowing. Eyes: Negative for visual disturbance. Respiratory: Positive for shortness of breath (winded some). Negative for chest tightness. Cardiovascular: Positive for palpitations. Negative for chest pain. Gastrointestinal: Positive for diarrhea and nausea. Negative for abdominal pain, constipation and vomiting. Genitourinary: Positive for flank pain, frequency, urgency and vaginal pain (pressure). Negative for difficulty urinating, dyspareunia, dysuria, enuresis and pelvic pain. Musculoskeletal: Positive for back pain. Skin: Negative for rash. Allergic/Immunologic: Negative for environmental allergies and food allergies.    Neurological: Negative culture    CBC, CMP, CT without to rule out stone/monitor renal cyst    Assessment and Plan:    Tim Jarrell is 37 y/o female who was seen today for urinary frequency, flank pain and abdominal pain. History of sensitivity to many antibiotics, point-of-care urinalysis negative for blood, nitrites, leukocytes, pH change in the office. Patient with flank tenderness, right lower quadrant tenderness no rebound and concerning for nephrolithiasis versus other pathology. CBC, CMP, CT of the abdomen pelvis without contrast.  Patient with a simple renal cyst in 2013 and followed by urology at that time. Encouraged patient make a follow-up appointment with urology in the next 70 to 96 hours pending CT results and expectant management from there. Would consider Flomax per patient sulfa sensitive/allergic. Encourage liberal hydration, 128 ounces or 1 gallon of water a day with lemon and lime added. Use NSAIDs for analgesia. If all negative consider empiric treatment with Keflex as patient can tolerate this antibiotic versus other antibiotics she is taken in the past for UTI. 1. Urinary frequency  2. Right flank pain  3. History of nephrolithiasis  4. History of simple renal cyst  - POCT Urinalysis no Micro  - Culture, Urine  - Comprehensive Metabolic Panel; Future  - CBC Auto Differential; Future  - CT ABDOMEN PELVIS WO CONTRAST Additional Contrast? None; Future    Return in about 3 days (around 2/26/2021) for urology follow up. Warren Atkinson.  Gonzalo Jensen.  2/23/2021

## 2021-02-24 LAB — URINE CULTURE, ROUTINE: NORMAL

## 2021-03-01 ENCOUNTER — HOSPITAL ENCOUNTER (INPATIENT)
Age: 41
LOS: 3 days | Discharge: HOME | DRG: 885 | End: 2021-03-04
Attending: PSYCHIATRY & NEUROLOGY | Admitting: PSYCHIATRY & NEUROLOGY
Payer: COMMERCIAL

## 2021-03-01 ENCOUNTER — HOSPITAL ENCOUNTER (EMERGENCY)
Age: 41
Discharge: ANOTHER HOSPITAL | End: 2021-03-01
Attending: EMERGENCY MEDICINE
Payer: COMMERCIAL

## 2021-03-01 VITALS
HEIGHT: 65 IN | SYSTOLIC BLOOD PRESSURE: 149 MMHG | OXYGEN SATURATION: 100 % | DIASTOLIC BLOOD PRESSURE: 110 MMHG | HEART RATE: 113 BPM | RESPIRATION RATE: 20 BRPM | TEMPERATURE: 98.8 F | WEIGHT: 140 LBS | BODY MASS INDEX: 23.32 KG/M2

## 2021-03-01 DIAGNOSIS — T45.0X2A DIPHENHYDRAMINE OVERDOSE, INTENTIONAL SELF-HARM, INITIAL ENCOUNTER (HCC): Primary | ICD-10-CM

## 2021-03-01 PROBLEM — F32.A DEPRESSION: Status: ACTIVE | Noted: 2021-03-01

## 2021-03-01 LAB
A/G RATIO: 1.5 (ref 1.1–2.2)
ACETAMINOPHEN LEVEL: <5 UG/ML (ref 10–30)
ALBUMIN SERPL-MCNC: 4.8 G/DL (ref 3.4–5)
ALP BLD-CCNC: 62 U/L (ref 40–129)
ALT SERPL-CCNC: 13 U/L (ref 10–40)
AMPHETAMINE SCREEN, URINE: NORMAL
ANION GAP SERPL CALCULATED.3IONS-SCNC: 12 MMOL/L (ref 3–16)
AST SERPL-CCNC: 18 U/L (ref 15–37)
BARBITURATE SCREEN URINE: NORMAL
BASOPHILS ABSOLUTE: 0.1 K/UL (ref 0–0.2)
BASOPHILS RELATIVE PERCENT: 0.7 %
BENZODIAZEPINE SCREEN, URINE: NORMAL
BILIRUB SERPL-MCNC: <0.2 MG/DL (ref 0–1)
BUN BLDV-MCNC: 11 MG/DL (ref 7–20)
CALCIUM SERPL-MCNC: 9.9 MG/DL (ref 8.3–10.6)
CANNABINOID SCREEN URINE: NORMAL
CHLORIDE BLD-SCNC: 103 MMOL/L (ref 99–110)
CO2: 26 MMOL/L (ref 21–32)
COCAINE METABOLITE SCREEN URINE: NORMAL
CREAT SERPL-MCNC: 0.8 MG/DL (ref 0.6–1.1)
EKG ATRIAL RATE: 124 BPM
EKG DIAGNOSIS: NORMAL
EKG P AXIS: 48 DEGREES
EKG P-R INTERVAL: 140 MS
EKG Q-T INTERVAL: 336 MS
EKG QRS DURATION: 82 MS
EKG QTC CALCULATION (BAZETT): 482 MS
EKG R AXIS: -1 DEGREES
EKG T AXIS: 29 DEGREES
EKG VENTRICULAR RATE: 124 BPM
EOSINOPHILS ABSOLUTE: 0.1 K/UL (ref 0–0.6)
EOSINOPHILS RELATIVE PERCENT: 1.4 %
ETHANOL: NORMAL MG/DL (ref 0–0.08)
GFR AFRICAN AMERICAN: >60
GFR NON-AFRICAN AMERICAN: >60
GLOBULIN: 3.1 G/DL
GLUCOSE BLD-MCNC: 98 MG/DL (ref 70–99)
GONADOTROPIN, CHORIONIC (HCG) QUANT: <5 MIU/ML
HCT VFR BLD CALC: 41 % (ref 36–48)
HEMOGLOBIN: 14 G/DL (ref 12–16)
LYMPHOCYTES ABSOLUTE: 2.6 K/UL (ref 1–5.1)
LYMPHOCYTES RELATIVE PERCENT: 28.2 %
Lab: NORMAL
MAGNESIUM: 2.4 MG/DL (ref 1.8–2.4)
MCH RBC QN AUTO: 34.3 PG (ref 26–34)
MCHC RBC AUTO-ENTMCNC: 34.2 G/DL (ref 31–36)
MCV RBC AUTO: 100.3 FL (ref 80–100)
METHADONE SCREEN, URINE: NORMAL
MONOCYTES ABSOLUTE: 0.8 K/UL (ref 0–1.3)
MONOCYTES RELATIVE PERCENT: 8.6 %
NEUTROPHILS ABSOLUTE: 5.7 K/UL (ref 1.7–7.7)
NEUTROPHILS RELATIVE PERCENT: 61.1 %
OPIATE SCREEN URINE: NORMAL
OXYCODONE URINE: NORMAL
PDW BLD-RTO: 13 % (ref 12.4–15.4)
PH UA: 5
PHENCYCLIDINE SCREEN URINE: NORMAL
PLATELET # BLD: 284 K/UL (ref 135–450)
PMV BLD AUTO: 7.7 FL (ref 5–10.5)
POTASSIUM REFLEX MAGNESIUM: 3.8 MMOL/L (ref 3.5–5.1)
PROPOXYPHENE SCREEN: NORMAL
RBC # BLD: 4.09 M/UL (ref 4–5.2)
SALICYLATE, SERUM: <0.3 MG/DL (ref 15–30)
SARS-COV-2, NAAT: NOT DETECTED
SODIUM BLD-SCNC: 141 MMOL/L (ref 136–145)
SPECIMEN STATUS: NORMAL
TOTAL PROTEIN: 7.9 G/DL (ref 6.4–8.2)
WBC # BLD: 9.3 K/UL (ref 4–11)

## 2021-03-01 PROCEDURE — 1240000000 HC EMOTIONAL WELLNESS R&B

## 2021-03-01 PROCEDURE — 93010 ELECTROCARDIOGRAM REPORT: CPT | Performed by: INTERNAL MEDICINE

## 2021-03-01 PROCEDURE — 85025 COMPLETE CBC W/AUTO DIFF WBC: CPT

## 2021-03-01 PROCEDURE — 87635 SARS-COV-2 COVID-19 AMP PRB: CPT

## 2021-03-01 PROCEDURE — 93005 ELECTROCARDIOGRAM TRACING: CPT | Performed by: EMERGENCY MEDICINE

## 2021-03-01 PROCEDURE — 80307 DRUG TEST PRSMV CHEM ANLYZR: CPT

## 2021-03-01 PROCEDURE — 83735 ASSAY OF MAGNESIUM: CPT

## 2021-03-01 PROCEDURE — 82077 ASSAY SPEC XCP UR&BREATH IA: CPT

## 2021-03-01 PROCEDURE — 80179 DRUG ASSAY SALICYLATE: CPT

## 2021-03-01 PROCEDURE — 84702 CHORIONIC GONADOTROPIN TEST: CPT

## 2021-03-01 PROCEDURE — 6370000000 HC RX 637 (ALT 250 FOR IP): Performed by: EMERGENCY MEDICINE

## 2021-03-01 PROCEDURE — 2580000003 HC RX 258: Performed by: EMERGENCY MEDICINE

## 2021-03-01 PROCEDURE — 80143 DRUG ASSAY ACETAMINOPHEN: CPT

## 2021-03-01 PROCEDURE — 80053 COMPREHEN METABOLIC PANEL: CPT

## 2021-03-01 PROCEDURE — 99285 EMERGENCY DEPT VISIT HI MDM: CPT

## 2021-03-01 RX ORDER — VENLAFAXINE HYDROCHLORIDE 37.5 MG/1
37.5 CAPSULE, EXTENDED RELEASE ORAL ONCE
Status: COMPLETED | OUTPATIENT
Start: 2021-03-01 | End: 2021-03-01

## 2021-03-01 RX ORDER — 0.9 % SODIUM CHLORIDE 0.9 %
1000 INTRAVENOUS SOLUTION INTRAVENOUS ONCE
Status: COMPLETED | OUTPATIENT
Start: 2021-03-01 | End: 2021-03-01

## 2021-03-01 RX ADMIN — VENLAFAXINE HYDROCHLORIDE 37.5 MG: 37.5 CAPSULE, EXTENDED RELEASE ORAL at 14:31

## 2021-03-01 RX ADMIN — SODIUM CHLORIDE 1000 ML: 9 INJECTION, SOLUTION INTRAVENOUS at 11:31

## 2021-03-01 RX ADMIN — SODIUM CHLORIDE 1000 ML: 9 INJECTION, SOLUTION INTRAVENOUS at 12:41

## 2021-03-01 ASSESSMENT — SLEEP AND FATIGUE QUESTIONNAIRES
RESTFUL SLEEP: NO
DIFFICULTY FALLING ASLEEP: YES
SLEEP PATTERN: DISTURBED/INTERRUPTED SLEEP
DO YOU USE A SLEEP AID: NO
DO YOU HAVE DIFFICULTY SLEEPING: YES

## 2021-03-01 ASSESSMENT — LIFESTYLE VARIABLES: HISTORY_ALCOHOL_USE: YES

## 2021-03-01 ASSESSMENT — PATIENT HEALTH QUESTIONNAIRE - PHQ9: SUM OF ALL RESPONSES TO PHQ QUESTIONS 1-9: 10

## 2021-03-01 NOTE — ED NOTES
Marco Roldan ED Tech assigned patient sitter @10:45. Pt has been placed in appropriate suicide gown, room has been accommodated appropriately, and pt's belongings have been placed in bags and brought to RN station.       Padilla Szymanski  03/01/21 0383

## 2021-03-01 NOTE — ED NOTES
Pt to ED via , pt reports she took 25-30 benadryl pills last night at 2100, the pills were 25mg a piece in an effort to kill herself. She reports she still has the suicidal ideation and intent to harm herself. Pt placed in gown and personal belongings removed per policy. Monik Vieira, ED Tech to be sitter for patient for SI. Pt alert and oriented and without current distress.       Emelia Calderon, SHARON  03/01/21 4062

## 2021-03-01 NOTE — ED NOTES
RN rounded on pt. Pt swabbed for COVID 19. VSS and as charted. Pt remains on monitor. Remains in SI precautions with Rosalie Ferrera ED [facility] as sitter. Pt has no further needs at this time. Pt resting in bed, call light within reach. Pt denies any questions.         Andria Baron RN  03/01/21 7882

## 2021-03-01 NOTE — ED NOTES
RN rounded on pt. IVF started, VSS and as charted, pt remains HTN and TACHYCARDIC. MD aware. Pt remains on cardiac event monitor. Giancarlo Sanchez ED [facility] remains as sitter for SI precautions. Pt has no further needs at this time. Pt resting in bed, call light within reach. Pt denies any questions.         Rip Rogel RN  03/01/21 0350

## 2021-03-01 NOTE — ED NOTES
Spoke with Sandip Abernathy, patient's , and updated on plan of care. [phone] number for Sandip Abernathy.       Darylene Settle, RN  03/01/21 2002

## 2021-03-01 NOTE — ED NOTES
1251 - called [facility] access center and spoke with SHARON Santacruz   Re: intentional overdose   - Per SHARON Gorman will need a rapid covid prior to transfer initiation  [phone] - called access center to notify that pt's Covid is negative  [phone] - called access line again since no additional notes have been made regarding pt's transfer and spoke with SHARON Russo. She will reach out to Dr Erika Garcia - Dr Fidelia Whitehead called via access center to speak with Dr Charl Epley. Dr Charl Epley unavailable at this time, Dr Fidelia Whitehead to review chart  [facility] [address] [facility] back to let us know that Dr Fidelia Whitehead has accepted pt to [facility]. Faxed face sheet with hold to [phone] per request  [phone] - called access center to inquire for an update. Per SHARON Santacruz, we are still awaiting bed assignment (due to poss bed waiting to be cleaned or for a d/c)  6484 - Pt's bed is assigned [facility]. Pt is going to [facility]. Call report to [phone], Awaiting bed to get cleaned and [facility] to set up transport.       [facility]  03/01/21 9688

## 2021-03-01 NOTE — ED NOTES
Spoke with [facility] and updated on plan of care for patient.       Andria Baron, RN  03/01/21 9490

## 2021-03-01 NOTE — ED NOTES
RN rounded on pt. VS as charted and pt remains on monitor. Pt given something to drink and updated on plan of care. Pt remains in SI precautions with sitter at bedside. Pt has no further needs at this time. Pt resting in bed, call light within reach. Pt denies any questions.         Darylene Settle, RN  03/01/21 5170

## 2021-03-01 NOTE — ED NOTES
Patient's  updated at this time with patient's permission. Writer explained to  that the patient could potentially be here between 4-6 hours and reassured him that we will call him with updates.  verbalized understanding.       Chirag Byrd RN  03/01/21 1045

## 2021-03-01 NOTE — ED NOTES
Writer rounded on patient at this time to assess, establish IV access, and obtain blood work. Blood sent to lab at this time. Writer explained the visitor policy to patient, patient verbalizes understanding. Will continue to monitor.       Darrius Lemus RN  03/01/21 7605

## 2021-03-01 NOTE — ED NOTES
RN rounded on pt. Pt medicated and VS as charted. Pt remains on cardiac monitor. Pt updated on plan of care. Pt remains in SI precautions and sitter remains at bedside. Pt given something to drink. Pt has no further needs at this time. Pt resting in bed, call light within reach. Pt denies any questions.        Renee Blanchard RN  03/01/21 7857

## 2021-03-01 NOTE — ED NOTES
RN rounded on pt. Warm blanket given. Pt remains in SI precautions with Michelle Buenrostro ED [facility] at bedside. Pt remains on cardiac monitor. VS as charted. Pt has no further needs at this time. Pt resting in bed, call light within reach. Pt denies any questions.         Brionna Geronimo RN  03/01/21 7386

## 2021-03-02 PROBLEM — F43.10 PTSD (POST-TRAUMATIC STRESS DISORDER): Status: ACTIVE | Noted: 2021-03-02

## 2021-03-02 PROBLEM — T50.902A INTENTIONAL DRUG OVERDOSE (HCC): Status: ACTIVE | Noted: 2021-03-02

## 2021-03-02 PROBLEM — F33.2 SEVERE EPISODE OF RECURRENT MAJOR DEPRESSIVE DISORDER, WITHOUT PSYCHOTIC FEATURES (HCC): Status: ACTIVE | Noted: 2021-03-02

## 2021-03-02 PROCEDURE — 1240000000 HC EMOTIONAL WELLNESS R&B

## 2021-03-02 PROCEDURE — 6370000000 HC RX 637 (ALT 250 FOR IP): Performed by: PSYCHIATRY & NEUROLOGY

## 2021-03-02 PROCEDURE — 99223 1ST HOSP IP/OBS HIGH 75: CPT | Performed by: PSYCHIATRY & NEUROLOGY

## 2021-03-02 RX ORDER — LORAZEPAM 2 MG/1
2 TABLET ORAL
Status: DISCONTINUED | OUTPATIENT
Start: 2021-03-02 | End: 2021-03-04 | Stop reason: HOSPADM

## 2021-03-02 RX ORDER — IBUPROFEN 400 MG/1
400 TABLET ORAL EVERY 6 HOURS PRN
Status: DISCONTINUED | OUTPATIENT
Start: 2021-03-02 | End: 2021-03-04 | Stop reason: HOSPADM

## 2021-03-02 RX ORDER — LORAZEPAM 1 MG/1
1 TABLET ORAL
Status: DISCONTINUED | OUTPATIENT
Start: 2021-03-02 | End: 2021-03-04 | Stop reason: HOSPADM

## 2021-03-02 RX ORDER — TRAZODONE HYDROCHLORIDE 50 MG/1
50 TABLET ORAL NIGHTLY PRN
Status: DISCONTINUED | OUTPATIENT
Start: 2021-03-02 | End: 2021-03-04 | Stop reason: HOSPADM

## 2021-03-02 RX ORDER — VENLAFAXINE HYDROCHLORIDE 37.5 MG/1
37.5 CAPSULE, EXTENDED RELEASE ORAL
Status: DISCONTINUED | OUTPATIENT
Start: 2021-03-02 | End: 2021-03-02

## 2021-03-02 RX ORDER — FOLIC ACID 1 MG/1
1 TABLET ORAL DAILY
Status: DISCONTINUED | OUTPATIENT
Start: 2021-03-02 | End: 2021-03-04 | Stop reason: HOSPADM

## 2021-03-02 RX ORDER — M-VIT,TX,IRON,MINS/CALC/FOLIC 27MG-0.4MG
1 TABLET ORAL DAILY
Status: DISCONTINUED | OUTPATIENT
Start: 2021-03-02 | End: 2021-03-04 | Stop reason: HOSPADM

## 2021-03-02 RX ORDER — LORAZEPAM 2 MG/1
4 TABLET ORAL
Status: DISCONTINUED | OUTPATIENT
Start: 2021-03-02 | End: 2021-03-04 | Stop reason: HOSPADM

## 2021-03-02 RX ORDER — HYDROXYZINE PAMOATE 25 MG/1
50 CAPSULE ORAL 3 TIMES DAILY PRN
Status: DISCONTINUED | OUTPATIENT
Start: 2021-03-02 | End: 2021-03-04 | Stop reason: HOSPADM

## 2021-03-02 RX ORDER — VENLAFAXINE HYDROCHLORIDE 75 MG/1
75 CAPSULE, EXTENDED RELEASE ORAL
Status: DISCONTINUED | OUTPATIENT
Start: 2021-03-03 | End: 2021-03-04 | Stop reason: HOSPADM

## 2021-03-02 RX ORDER — LANOLIN ALCOHOL/MO/W.PET/CERES
100 CREAM (GRAM) TOPICAL DAILY
Status: DISCONTINUED | OUTPATIENT
Start: 2021-03-02 | End: 2021-03-04 | Stop reason: HOSPADM

## 2021-03-02 RX ORDER — ACETAMINOPHEN 325 MG/1
650 TABLET ORAL EVERY 4 HOURS PRN
Status: DISCONTINUED | OUTPATIENT
Start: 2021-03-02 | End: 2021-03-04 | Stop reason: HOSPADM

## 2021-03-02 RX ADMIN — MULTIPLE VITAMINS W/ MINERALS TAB 1 TABLET: TAB at 09:20

## 2021-03-02 RX ADMIN — VENLAFAXINE HYDROCHLORIDE 37.5 MG: 37.5 CAPSULE, EXTENDED RELEASE ORAL at 09:20

## 2021-03-02 RX ADMIN — LORAZEPAM 2 MG: 2 TABLET ORAL at 09:26

## 2021-03-02 RX ADMIN — TRAZODONE HYDROCHLORIDE 50 MG: 50 TABLET ORAL at 21:10

## 2021-03-02 RX ADMIN — FOLIC ACID 1 MG: 1 TABLET ORAL at 14:21

## 2021-03-02 RX ADMIN — LORAZEPAM 2 MG: 2 TABLET ORAL at 16:22

## 2021-03-02 RX ADMIN — Medication 100 MG: at 09:20

## 2021-03-02 ASSESSMENT — SLEEP AND FATIGUE QUESTIONNAIRES
DO YOU USE A SLEEP AID: YES
AVERAGE NUMBER OF SLEEP HOURS: 10
DO YOU HAVE DIFFICULTY SLEEPING: YES

## 2021-03-02 ASSESSMENT — PATIENT HEALTH QUESTIONNAIRE - PHQ9: SUM OF ALL RESPONSES TO PHQ QUESTIONS 1-9: 22
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
## 2021-03-02 NOTE — GROUP NOTE
Group Therapy Note    Date: 3/2/2021    Group Start Time: 1000  Group End Time: 0856  Group Topic: Psychoeducation    [facility]        Group Therapy Note    Topic: Anxiety & Panic Education    Group members defined anxiety and panic, described symptoms associated with each, and processed strategies for symptoms management, coping and accepting feelings of panic and anxiety. Attendees: 12         Patient's Goal:  Patients will participate in group processing of anxiety and panic, reflect on personally experienced symptoms and set goals for behavioral management and positive coping. Notes:  Patient present at introduction of interventions, exited group early with tearful affect. Did not return for remainder of group time.     Discipline Responsible: Psychoeducational Specialist    Signature:  Clint Gaona MM, MT-BC

## 2021-03-02 NOTE — GROUP NOTE
Group Therapy Note    Date: 3/2/2021    Group Start Time: 1430  Group End Time: 5128  Group Topic: Healthy Living/Wellness    [address] [facility] Note    Attendees: 3    In lieu of visitation being cancelled, patients were permitted to use their personal device to connect with their support system via video chat with staff supervision. Notes:  Pt was engaged and connect with her spouse.      Status After Intervention:  Improved    Participation Level: Interactive    Participation Quality: Appropriate      Speech:  normal      Thought Process/Content: Linear      Affective Functioning: Flat and Constricted/Restricted      Mood: depressed      Level of consciousness:  Alert      Response to Learning: Progressing to goal      Endings: None Reported    Modes of Intervention: Support, Socialization, Exploration, Clarifying and Problem-solving      Discipline Responsible: /Counselor      Signature:  ITALO Monzon, R-CONSUELOT

## 2021-03-02 NOTE — GROUP NOTE
Group Therapy Note    Date: 3/2/2021    Group Start Time: 5236  Group End Time: 8479  Group Topic: Recovery    [facility]    [facility]        Group Therapy Note    Attendees: 10           Patient's Goal:  Patient will complete worksheet on People Pleasing. Will discuss how people pleasing behavior negatively impacts mental health. Notes:  Patient attend group. Completed the worksheet and discussed. Appeared to relate to the topic and gave examples from the patients life. Status After Intervention:  Improved    Participation Level:  Active Listener and Interactive    Participation Quality: Appropriate, Attentive, Sharing and Supportive    Speech:  normal    Thought Process/Content: Logical  Linear    Affective Functioning: Congruent    Mood: anxious and depressed    Level of consciousness:  Oriented x4    Response to Learning: Able to verbalize current knowledge/experience and Capable of insight    Endings: None Reported    Modes of Intervention: Education, Support, Socialization and Exploration    Discipline Responsible: /Counselor      Signature:  [facility]

## 2021-03-02 NOTE — BH NOTE
`[facility]  Admission Note     Admission Type:   Admission Type:  Involuntary    Reason for admission:  Reason for Admission: Overdose, planned suicide attempt    PATIENT STRENGTHS:  Strengths: No significant Physical Illness    Patient Strengths and Limitations:  Limitations: General negative or hopeless attitude about future/recovery    Addictive Behavior:   Addictive Behavior  In the past 3 months, have you felt or has someone told you that you have a problem with:  : None  Do you have a history of Chemical Use?: No  Do you have a history of Alcohol Use?: Yes  Do you have a history of Street Drug Abuse?: No  Histroy of Prescripton Drug Abuse?: No    Medical Problems:   Past Medical History:   Diagnosis Date    Abnormal Pap smear of cervix     Alcohol abuse 8/20/2020    Anemia     Anxiety 4/3/2020    Asthma 1/12/2011    Breast disorder     lumps removed    Post partum depression     Renal cyst        Status EXAM:  Status and Exam  Normal: No  Facial Expression: Sad  Affect: Congruent  Level of Consciousness: Alert  Mood:Normal: No  Mood: Depressed, Anxious  Motor Activity:Normal: No  Motor Activity: Decreased  Interview Behavior: Cooperative  Preception: [place] to Person, Keron Warrick to Time, [place] to Place, [place] to Situation  Attention:Normal: Yes  Thought Content:Normal: Yes  Hallucinations: None  Delusions: No  Memory:Normal: Yes  Insight and Judgment: No  Insight and Judgment: Poor Judgment, Poor Insight  Present Suicidal Ideation: No  Present Homicidal Ideation: No    Tobacco Screening:  Practical Counseling, on admission, antonio X, if applicable and completed (first 3 are required if patient doesn't refuse):            ( )  Recognizing danger situations (included triggers and roadblocks)                    ( )  Coping skills (new ways to manage stress, exercise, relaxation techniques, changing routine, distraction)

## 2021-03-02 NOTE — PRE-CERTIFICATION NOTE
Auth # [id number]  Authorized for 5 days (3/1/21 -3/5/21)  Review date: 3/5/21   Reviewer: [phone] (BILL team)

## 2021-03-02 NOTE — ED NOTES
Per ED  [address] [facility] [facility] [address], removed patients IV for transport.      Vandana Mojica  03/01/21 4053

## 2021-03-02 NOTE — ED NOTES
Writer rounded on patient, provided patient with turkey sandwich, pudding, jello, applesauce, and sprite. Patient offered lean cuisine, declined. Patient denies further needs. Sitter remains at bedside.       Maurilio Donohue RN  03/01/21 1952
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
## 2021-03-02 NOTE — PROGRESS NOTES
Behavioral Services  Medicare Certification Upon Admission    I certify that this patient's inpatient psychiatric hospital admission is medically necessary for:   X (1) Treatment which could reasonably be expected to improve this patient's condition,      X (2) Or for diagnostic study;     AND    X (2) The inpatient psychiatric services are provided while the individual is under the care of a physician and are included in the individualized plan of care.     Estimated length of stay/service 3-5 days    Plan for post-hospital care outpt    Electronically signed by David Pyle MD on 3/2/2021 at 3:18 PM

## 2021-03-02 NOTE — PROGRESS NOTES
Patient meeting with [facility] rep during my rounds, my colleague will complete medical H&P on 3/3    Richardson Bates PA-C  3/2/2021 3:30 PM

## 2021-03-02 NOTE — ED NOTES
Spoke with Lisandro Ocasio, patient's  and updated on plan of care.       Ml Ulloa, SHARON  03/01/21 2604

## 2021-03-02 NOTE — ED NOTES
RN rounded on pt. Pt updated on plan of care. Remains on cardiac event monitor. VS as charted. Pt remains in SI precautions and with sitter at bedside. Pt has no further needs at this time. Pt resting in bed, call light within reach. Pt denies any questions.              Dannette Cowden, RN  03/01/21 8999

## 2021-03-03 LAB
CHOLESTEROL, TOTAL: 234 MG/DL (ref 0–199)
HDLC SERPL-MCNC: 70 MG/DL (ref 40–60)
LDL CHOLESTEROL CALCULATED: 147 MG/DL
TRIGL SERPL-MCNC: 83 MG/DL (ref 0–150)
TSH SERPL DL<=0.05 MIU/L-ACNC: 0.79 UIU/ML (ref 0.27–4.2)
VLDLC SERPL CALC-MCNC: 17 MG/DL

## 2021-03-03 PROCEDURE — 6370000000 HC RX 637 (ALT 250 FOR IP): Performed by: NURSE PRACTITIONER

## 2021-03-03 PROCEDURE — 1240000000 HC EMOTIONAL WELLNESS R&B

## 2021-03-03 PROCEDURE — 6370000000 HC RX 637 (ALT 250 FOR IP): Performed by: PSYCHIATRY & NEUROLOGY

## 2021-03-03 PROCEDURE — 84443 ASSAY THYROID STIM HORMONE: CPT

## 2021-03-03 PROCEDURE — 80061 LIPID PANEL: CPT

## 2021-03-03 PROCEDURE — 97165 OT EVAL LOW COMPLEX 30 MIN: CPT

## 2021-03-03 PROCEDURE — 97535 SELF CARE MNGMENT TRAINING: CPT

## 2021-03-03 PROCEDURE — 99233 SBSQ HOSP IP/OBS HIGH 50: CPT | Performed by: PSYCHIATRY & NEUROLOGY

## 2021-03-03 PROCEDURE — 99222 1ST HOSP IP/OBS MODERATE 55: CPT | Performed by: NURSE PRACTITIONER

## 2021-03-03 PROCEDURE — 36415 COLL VENOUS BLD VENIPUNCTURE: CPT

## 2021-03-03 PROCEDURE — 83036 HEMOGLOBIN GLYCOSYLATED A1C: CPT

## 2021-03-03 RX ORDER — DOCUSATE SODIUM 100 MG/1
100 CAPSULE, LIQUID FILLED ORAL 2 TIMES DAILY PRN
Status: DISCONTINUED | OUTPATIENT
Start: 2021-03-03 | End: 2021-03-04 | Stop reason: HOSPADM

## 2021-03-03 RX ADMIN — VENLAFAXINE HYDROCHLORIDE 75 MG: 75 CAPSULE, EXTENDED RELEASE ORAL at 08:29

## 2021-03-03 RX ADMIN — TRAZODONE HYDROCHLORIDE 50 MG: 50 TABLET ORAL at 21:25

## 2021-03-03 RX ADMIN — Medication 100 MG: at 08:29

## 2021-03-03 RX ADMIN — LORAZEPAM 1 MG: 1 TABLET ORAL at 10:36

## 2021-03-03 RX ADMIN — FOLIC ACID 1 MG: 1 TABLET ORAL at 08:28

## 2021-03-03 RX ADMIN — LORAZEPAM 2 MG: 2 TABLET ORAL at 16:01

## 2021-03-03 RX ADMIN — DOCUSATE SODIUM 100 MG: 100 CAPSULE ORAL at 13:32

## 2021-03-03 RX ADMIN — MULTIPLE VITAMINS W/ MINERALS TAB 1 TABLET: TAB at 08:29

## 2021-03-03 NOTE — H&P
Hospital Medicine History & Physical      PCP: David Lawrence DO    Date of Admission: 3/1/2021    Date of Service: Pt seen/examined on 3/3/2021    Chief Complaint:  SI, intentional diphenhydramine overdose       History Of Present Illness: The patient is a 36 y.o. female with depression, anxiety, and alcohol abuse who presented to [facility] for SI, intentional diphenhydramine overdose. Patient was seen and evaluated in the ED by the ED medical provider, patient was medically cleared for admission to [facility] at [facility]. This note serves as an admission medical H&P. Tobacco use: denies  ETOH use: yes, daily 1-2 bottles of wine  Illicit drug use: denies    Patient denies any medical complaints     Past Medical History:        Diagnosis Date    Abnormal Pap smear of cervix     Alcohol abuse 8/20/2020    Anemia     Anxiety 4/3/2020    Asthma 1/12/2011    Breast disorder     lumps removed    Post partum depression     Renal cyst        Past Surgical History:        Procedure Laterality Date    BREAST SURGERY  1/2012    benign tumor removed left breast    EYE SURGERY Bilateral 2002    lasik    HYSTERECTOMY ABDOMINAL N/A 2/21/2019    DAVINCI ROBOTIC TOTAL LAPAROSCOPIC HYSTERECTOMY WITH BILATERAL SALPINGECTOMY,  WITH REMOVAL OF INTRAUTERINE DEVICE, AND RIGHT OVARIAN CYSTECTOMY                **LATEX SENSITIVE**  CPT CODE - 80647 performed by Qian Nugent DO at [address]  12/13/2013    LITHOTRIPSY  1/2014    TONSILLECTOMY      T&A       Medications Prior to Admission:    Prior to Admission medications    Medication Sig Start Date End Date Taking? Authorizing Provider   venlafaxine (EFFEXOR XR) 37.5 MG extended release capsule TAKE 1 CAPSULE BY MOUTH EVERY DAY 12/14/20  Bertha Lawrence DO   naltrexone (DEPADE) 50 MG tablet  8/11/20   Historical Provider, MD Allergies:  Latex, Penicillins, Macrobid [nitrofurantoin], Vancomycin, Ciprofloxacin, Other, and Sulfa antibiotics    Social History:  The patient currently lives home with  and kids    TOBACCO:   reports that she has never smoked. She has never used smokeless tobacco.  ETOH:   reports current alcohol use of about 14.0 standard drinks of alcohol per week. Family History:   Positive as follows:        Problem Relation Age of Onset    High Blood Pressure Mother     High Blood Pressure Maternal Grandmother     Cancer Maternal Grandmother         colon    Heart Disease Maternal Grandfather     No Known Problems Father     No Known Problems Brother     No Known Problems Paternal Grandfather     No Known Problems Paternal Grandmother        REVIEW OF SYSTEMS:     Constitutional: Negative for fever   HENT: Negative for sore throat   Eyes: Negative for redness   Respiratory: Negative  for dyspnea, cough   Cardiovascular: Negative for chest pain   Gastrointestinal: Negative for vomiting, diarrhea + constipation   Genitourinary: Negative for hematuria   Musculoskeletal: Negative for arthralgias   Skin: Negative for rash   Neurological: Negative for syncope    Hematological: Negative for easy bruising/bleeding   Psychiatric/Behavorial: Per psychiatry team evaluation     PHYSICAL EXAM:    /78   Pulse 120   Temp 98 °F (36.7 °C) (Temporal)   Resp 16   Ht 5' 5\" (1.651 m)   Wt 140 lb (63.5 kg)   LMP 02/04/2019 (Exact Date)   SpO2 98%   BMI 23.30 kg/m²     Gen: No distress. Alert- tearful   Eyes:  No sclera icterus. No conjunctival injection. ENT: No discharge. Pharynx clear. Neck: No JVD. No Carotid Bruit. Trachea midline. Resp: No accessory muscle use. No crackles. No wheezes. No rhonchi. CV: tachycardic. Regular rhythm. No murmur. No rub. No edema. GI: Non-tender. Non-distended. Normal bowel sounds. Skin: Warm and dry. No nodule on exposed extremities. No rash on exposed extremities. M/S: No cyanosis. No joint deformity. No clubbing. Neuro: Awake. No focal neurologic deficit on exam.  Cranial nerves II through XII intact. Patient is able to ambulate without difficulty. Psych: Per psychiatry team evaluation     CBC:   Recent Labs     03/01/21  1041   WBC 9.3   HGB 14.0   HCT 41.0   .3*        BMP:   Recent Labs     03/01/21  1041      K 3.8      CO2 26   BUN 11   CREATININE 0.8     LIVER PROFILE:   Recent Labs     03/01/21  1041   AST 18   ALT 13   BILITOT <0.2   ALKPHOS 62     PT/INR: No results for input(s): PROTIME, INR in the last 72 hours. APTT: No results for input(s): APTT in the last 72 hours.   UA:  Recent Labs     03/01/21  1030   PHUR 5.0        U/A:    Lab Results   Component Value Date    NITRITE neg 02/23/2021    COLORU light yellow 02/23/2021    COLORU Yellow 01/18/2014    WBCUA 6-10 01/18/2014    RBCUA 0-2 01/18/2014    MUCUS Rare 01/18/2014    BACTERIA 1+ 01/18/2014    CLARITYU clear 02/23/2021    CLARITYU Clear 01/18/2014    SPECGRAV 1.005 02/23/2021    SPECGRAV 1.015 01/18/2014    LEUKOCYTESUR neg 02/23/2021    LEUKOCYTESUR TRACE 01/18/2014    BLOODU neg 02/23/2021    BLOODU Negative 01/18/2014    GLUCOSEU neg 02/23/2021    GLUCOSEU 100 01/18/2014       CULTURES  SARS-CoV-2, NAAT Not Detected        EKG:  I have reviewed the EKG with the following interpretation:   ST, QTc 482    RADIOLOGY  No orders to display       ASSESSMENT/PLAN:  Depression  SI  - per psychiatry team    Intentional Overdose  - diphenhydramine per ER note 25-30 on 2/28 night and bottle of wine  - cleared for admission to [facility] from ER    Alcohol Abuse  Alcohol Withdrawal  - last drink was 2/28 EtOH level on admission: - none detected  - fall and seizure precautions  - [facility] protocol in place, on thiamine, folic acid and MVM  - defer to psychiatry for alcohol w/d management - cessation counseled     Tachycardia  - EKG showed ST  - likely related to alcohol withdrawal - continue with CIWA  - pt is asymptomatic   - continue to monitor    Constipation   - add PRN Colace per patient request     Pt has no medical complaints at this time. They were informed that should a medical concern arise during their admission they may have [facility] contact us.         Adelfo Menendez, AGATHA - CNP   3/3/2021 105

## 2021-03-03 NOTE — PLAN OF CARE
Problem: Suicide risk  Goal: Provide patient with safe environment  Description: Provide patient with safe environment  Outcome: Met This Shift  Note: Pt remains on Q15min. Checks and safe environment per unit. Pt denies SI, HI, AVH. Pt is attending groups, visible and social in day room. Pt is cooperative and able to voice needs to staff. Pt rates depression and anxiety 7/10. Pt states she normally sleeps well but has had some broken sleep here and requested Trazodone for sleep tonight. Normal appetite. Pt lists coping skills to breath, focus on the moment, stop Sun'aq talking. Pt lists safety plan is her .

## 2021-03-03 NOTE — GROUP NOTE
Group Therapy Note    Date: 3/3/2021    Group Start Time: 1450  Group End Time: 1520  Group Topic: Healthy Living/Wellness    [address]        Group Therapy Note    Attendees: 3         Patient's Goal:  Patient was invited to participate in a Healthy Living / Wellness group connecting patient to support system. Notes:  Due to COVID-19 Visitor Restrictions, Duy Santos was provided with the opportunity to use her personal phone to video conference with her support sytem with therapist monitoring the call. Duy Santos spoke with her  and became tearful during the call and shared how she was feeling. Duy Santos ended her phone call early around 3:05 pm.    Status After Intervention:  Unchanged    Participation Level:  Active Listener and Interactive    Participation Quality: Appropriate, Attentive and Sharing      Speech:  normal      Thought Process/Content: Logical      Affective Functioning: Blunted      Mood: anxious and depressed      Level of consciousness:  Alert, Oriented x4 and Attentive      Response to Learning: Able to verbalize current knowledge/experience, Able to verbalize/acknowledge new learning, Able to retain information and Capable of insight      Endings: None Reported    Modes of Intervention: Education, Support, Socialization, Exploration, Clarifying, Problem-solving and Media      Discipline Responsible: Psychoeducational Specialist      Signature:  Nicole Gutierrez

## 2021-03-03 NOTE — PROGRESS NOTES
Inpatient Occupational Therapy  Evaluation and Treatment    Unit:  [facility]  Date:  3/3/2021  Patient Name:    Contreras Guy  Admitting diagnosis:  Depression, unspecified depression type [F32.9]  Admit Date:  3/1/2021  Precautions/Restrictions/WB Status/ Lines/ Wounds/ Oxygen:  Up as tolerated; seizure precautions, fall precautions  Treatment Time:  10:18-10:49  Treatment Number:  1    Patient Goals for Therapy:  \" Get back to my life and not be miserable. \"      Discharge Recommendations:   []Home Independently  [x] Home with assist prn [] Home OT  []SNF  []ARU    DME needs for discharge:   NA     AM-PAC Score:  24     Home Health S4 Level: [x] NA   [] Level 1- Standard  []  Level 2- Social  [] Level 3- Safety  []  Level 4- Sick    ACLS:   TBA      Preadmission Environment:    Pt. Lives   [] alone  [x]with  and 3 children  Home environment:   []Apartment   []one story  [x]two story home. Steps to enter first floor:    [] No steps     [x]  5 Steps to enter   [x] Railings    Steps to second floor  [x] Full Flight of 13  Bathroom: [x] Bath Tub Shower  []Walk in [place]  [] Grab bars  Equipment owned: [] RW [] SW  []Rollator []W/C  []Shower Chair []BSC []Reacher  [employer] [] Other     Preadmission Status / PLOF:  History of falls   []Yes  [x]No  Pt. Able to drive   [x]Yes  []No   Pt Fully independent for ADLs/IADLs. [x]Yes  []No    Pt. Required assistance from family for:  []Bathing []Dressing []Cooking []Cleaning  []Laundry  []Other :   Pt. Fully independent for transfers and gait and walked with: [x]No Device  []Walker   []Cane  Sleep Hygiene:   10 hours sleep avg; fragmented sleep; difficulty with sleep onset. Income:  Full time; 40 hours; works for [employer]. Doesn't like her schedule and the managers are difficult. Financial Management:  Self; Pt. Reports no difficulty. Financial strain secondary to COVID-19. Leisure Interests:  Out to eat, watch TV, movies, reading  Medication Management:  Self; Uses pill organizer. Pt. Reports no difficulty. \"I just usually take them all with lunch. \"  Health Management:  Pt. Reports that she has a PCP, however no Psychiatrist or therapist.  Social Network:    Stressors:  1. Job. 2. Paying bills. 3.  Transporting children. Coping Skills:  Drinking    Pain  []Yes  [x]No  Rating:  Location:  Pain Medicine Status: [] Denies need  [] Pain med requested  [] RN notified. Cognition    A&Ox4, patient cooperative, tearful. Follows []1 step and [x] 3 step commands. Upper Extremity ROM:    WFL, pt able to perform all bed mobility, transfers, and gait without ROM limitation. Upper Extremity Strength:    WFL, pt able to perform all bed mobility, transfers, and gait without strength limitation. Upper Extremity Sensation:    No apparent deficits. Upper Extremity Proprioception:    No apparent deficits. Skin Integrity:  intact     Coordination and Tone:  WFL    Balance  Static Sitting:  [x] Good [] Fair [] Poor  Dynamic Sitting:  [x] Good [] Fair [] Poor  Static Standing: [x] Good [] Fair [] Poor  Dynamic Standing: [x] Good [] Fair [] Poor    Bed mobility:  independent  Supine to sit:  Sit to supine:  Scooting to head of bed:  Scooting in sitting:  Rolling:  Bridging:    Transfers:   independent  Sit to stand:  Stand to sit:  Bed/Chair to/from toilet:    Self Care: Independent  Toileting:  Grooming:  Dressing:    Exercise / Activities Initiated:   Pt. Educated on role of OT. Pt. Participated in:  Eval, ACLS, ADL Retraining, coping skills, medication management, and health management. Assessment of Deficits:   Pt demonstrated decreased activity tolerance, decreased safety awareness, decreased cognition, and decreased ADL/IADL status. Pt. Limited during evaluation by decreased cognition. At end of evaluation, pt. In room.     Goal(s) :

## 2021-03-03 NOTE — GROUP NOTE
Group Therapy Note    Date: 3/2/2021    Group Start Time: 1945  Group End Time: 2015  Group Topic: Wrap-Up    [facility]        Group Therapy Note    Attendees: Goals and importance of goal setting discussed. Night time milieu activities discussed. Patient's Goal:  To get acclimated to unit    Notes:  Successful     Status After Intervention:  Improved    Participation Level:  Active Listener and Interactive    Participation Quality: Appropriate and Attentive      Speech:  normal      Thought Process/Content: Logical  Linear      Affective Functioning: Congruent      Mood: anxious      Level of consciousness:  Alert and Oriented x4      Response to Learning: Progressing to goal      Endings: None Reported    Modes of Intervention: Support      Discipline Responsible: Canonical      Signature:  Karolina Aldana

## 2021-03-03 NOTE — PLAN OF CARE
Pt A&O, visible on unit attending groups and community meeting, social with peers eating in dinning room and watching tv with peers. Pt states anxiety much better than yesterday.

## 2021-03-03 NOTE — GROUP NOTE
Group Therapy Note    Date: 3/3/2021    Group Start Time: 1100  Group End Time: 6074  Group Topic: Psychotherapy    [facility]    Meka Patterson, [facility]        Group Therapy Note    Attendees: 8       Patient's Goal:  Pt's goal was to ask for help. Notes:  Pt was engaged in group. Pt shared and listened. Pt met goal.    Status After Intervention:  Unchanged    Participation Level:  Active Listener and Interactive    Participation Quality: Appropriate, Attentive and Sharing      Speech:  normal      Thought Process/Content: Logical  Linear      Affective Functioning: Congruent      Mood: anxious and depressed      Level of consciousness:  Alert, Oriented x4 and Attentive      Response to Learning: Able to verbalize current knowledge/experience and Progressing to goal      Endings: None Reported    Modes of Intervention: Education, Support, Socialization, Exploration, Clarifying, Problem-solving, Activity, Movement, Confrontation, Limit-setting and Reality-testing      Discipline Responsible: /Counselor      Signature:  Meka Patterson, [facility]

## 2021-03-03 NOTE — PROGRESS NOTES
Department of Psychiatry  AttendingProgress Note  Chief Complaint: depression  Fitz Blandon is feeling somewhat better. She is active in program and has not had any SI. She is worried about her financial future as she is unable to generate enough income in the current environment. Tolerating increased Effexor XR 75 mg AM. Was anxious in her OT eval and was given Ativan which made her groggy. Discussed meeting with [facility]. Patient's chart was reviewed and collaborated with  about the treatment plan. SUBJECTIVE:    Patient is feeling better. Suicidal ideation:  denies suicidal ideation. Patient does not have medication side effects. ROS: Patient has new complaints: no  Sleeping adequately:  Yes   Appetite adequate: Yes  Attending groups: Yes  Visitors:No    OBJECTIVE    Physical  VITALS:  /78   Pulse 122   Temp 98 °F (36.7 °C) (Temporal)   Resp 16   Ht 5' 5\" (1.651 m)   Wt 140 lb (63.5 kg)   LMP 02/04/2019 (Exact Date)   SpO2 98%   BMI 23.30 kg/m²     Mental Status Examination:  Patients appearance was street clothes. Thoughts are Goal directed. Homicidal ideations none. No abnormal movements, tics or mannerisms. Memory intact Aims 0. Concentration Fair. Alert and oriented X 4. Insight and Judgement normal insight and judgment. Patient was cooperative.  Patient gait normal. Mood depressed, affect normal affect Hallucinations Absent, suicidal ideations no specific plan to harm self Speech normal volume  Data  Labs:   Admission on 03/01/2021   Component Date Value Ref Range Status    TSH 03/03/2021 0.79  0.27 - 4.20 uIU/mL Final            Medications  Current Facility-Administered Medications: docusate sodium (COLACE) capsule 100 mg, 100 mg, Oral, BID PRN  acetaminophen (TYLENOL) tablet 650 mg, 650 mg, Oral, Q4H PRN  ibuprofen (ADVIL;MOTRIN) tablet 400 mg, 400 mg, Oral, Q6H PRN  magnesium hydroxide (MILK OF MAGNESIA) 400 MG/5ML suspension 30 mL, 30 mL, Oral, Daily PRN traZODone (DESYREL) tablet 50 mg, 50 mg, Oral, Nightly PRN  hydrOXYzine (VISTARIL) capsule 50 mg, 50 mg, Oral, TID PRN  thiamine tablet 100 mg, 100 mg, Oral, Daily  therapeutic multivitamin-minerals 1 tablet, 1 tablet, Oral, Daily  LORazepam (ATIVAN) tablet 1 mg, 1 mg, Oral, Q1H PRN **OR** LORazepam (ATIVAN) tablet 2 mg, 2 mg, Oral, Q1H PRN **OR** LORazepam (ATIVAN) tablet 3 mg, 3 mg, Oral, Q1H PRN **OR** LORazepam (ATIVAN) tablet 4 mg, 4 mg, Oral, Q1H PRN  venlafaxine (EFFEXOR XR) extended release capsule 75 mg, 75 mg, Oral, Daily with breakfast  folic acid (FOLVITE) tablet 1 mg, 1 mg, Oral, Daily    ASSESSMENT AND PLAN    Principal Problem:    Severe episode of recurrent major depressive disorder, without psychotic features ([facility]  Active Problems:    Alcohol abuse    PTSD (post-traumatic stress disorder)    Intentional drug overdose ([facility]  Resolved Problems:    * No resolved hospital problems. *       1. Patient s symptoms   are improving  2. Probable discharge is 1-2 days   3. Discharge planning is incomplete  4. Suicidal ideation is none  5. Total time with patient was 40 minutes and more than 50 % of that time was spent counseling the patient on their symptoms, treatment and expected goals.

## 2021-03-04 VITALS
OXYGEN SATURATION: 98 % | WEIGHT: 140 LBS | BODY MASS INDEX: 23.32 KG/M2 | TEMPERATURE: 98.2 F | HEIGHT: 65 IN | HEART RATE: 113 BPM | DIASTOLIC BLOOD PRESSURE: 75 MMHG | SYSTOLIC BLOOD PRESSURE: 110 MMHG | RESPIRATION RATE: 16 BRPM

## 2021-03-04 LAB
ESTIMATED AVERAGE GLUCOSE: 99.7 MG/DL
HBA1C MFR BLD: 5.1 %

## 2021-03-04 PROCEDURE — 6370000000 HC RX 637 (ALT 250 FOR IP): Performed by: PSYCHIATRY & NEUROLOGY

## 2021-03-04 PROCEDURE — 99239 HOSP IP/OBS DSCHRG MGMT >30: CPT | Performed by: PSYCHIATRY & NEUROLOGY

## 2021-03-04 PROCEDURE — 5130000000 HC BRIDGE APPOINTMENT

## 2021-03-04 RX ORDER — TRAZODONE HYDROCHLORIDE 50 MG/1
50 TABLET ORAL NIGHTLY PRN
Qty: 30 TABLET | Refills: 0 | Status: SHIPPED | OUTPATIENT
Start: 2021-03-04 | End: 2021-03-31

## 2021-03-04 RX ORDER — LANOLIN ALCOHOL/MO/W.PET/CERES
100 CREAM (GRAM) TOPICAL DAILY
Qty: 30 TABLET | Refills: 0 | Status: SHIPPED | OUTPATIENT
Start: 2021-03-05 | End: 2021-10-28

## 2021-03-04 RX ORDER — M-VIT,TX,IRON,MINS/CALC/FOLIC 27MG-0.4MG
1 TABLET ORAL DAILY
Qty: 30 TABLET | Refills: 0 | Status: SHIPPED | OUTPATIENT
Start: 2021-03-05

## 2021-03-04 RX ORDER — FOLIC ACID 1 MG/1
1 TABLET ORAL DAILY
Qty: 30 TABLET | Refills: 0 | Status: SHIPPED | OUTPATIENT
Start: 2021-03-05 | End: 2021-10-28

## 2021-03-04 RX ORDER — VENLAFAXINE HYDROCHLORIDE 75 MG/1
75 CAPSULE, EXTENDED RELEASE ORAL
Qty: 30 CAPSULE | Refills: 0 | Status: SHIPPED | OUTPATIENT
Start: 2021-03-05 | End: 2021-03-31 | Stop reason: SDUPTHER

## 2021-03-04 RX ORDER — HYDROXYZINE PAMOATE 25 MG/1
25 CAPSULE ORAL DAILY PRN
Qty: 30 CAPSULE | Refills: 0 | Status: SHIPPED | OUTPATIENT
Start: 2021-03-04 | End: 2021-03-31 | Stop reason: SDUPTHER

## 2021-03-04 RX ADMIN — MULTIPLE VITAMINS W/ MINERALS TAB 1 TABLET: TAB at 08:26

## 2021-03-04 RX ADMIN — FOLIC ACID 1 MG: 1 TABLET ORAL at 08:26

## 2021-03-04 RX ADMIN — HYDROXYZINE PAMOATE 50 MG: 25 CAPSULE ORAL at 08:26

## 2021-03-04 RX ADMIN — Medication 100 MG: at 08:26

## 2021-03-04 RX ADMIN — VENLAFAXINE HYDROCHLORIDE 75 MG: 75 CAPSULE, EXTENDED RELEASE ORAL at 08:26

## 2021-03-04 RX ADMIN — MAGNESIUM HYDROXIDE 30 ML: 400 SUSPENSION ORAL at 09:45

## 2021-03-04 NOTE — PLAN OF CARE
Problem: Altered Mood, Depressive Behavior:  Goal: Able to verbalize and/or display a decrease in depressive symptoms  Description: Able to verbalize and/or display a decrease in depressive symptoms  Outcome: Ongoing   Raz Manzano was in bed resting for much of the evening. She was woken up for the evening groups, which she did attend. Later, Raz Manzano told this nurse that she was glad that staff woke her for the groups. Raz Manzano did not stay up long, soon after eating a snack she went back to bed. During 1:1 care interview, César Morales stated that she attended all of the day shift groups. She stated that she met with the Dr and hopes to discharge on Friday. CIWA scored a 0. Raz Manzano denied suicidal and homicidal ideations. She also denied hallucinations.

## 2021-03-04 NOTE — GROUP NOTE
Group Therapy Note    Date: 3/3/2021    Group Start Time: 2020  Group End Time: 2050  Group Topic: Wrap-Up    [facility]        Group Therapy Note    Attendees: Goals and importance of goal setting discussed. Night time milieu activities discussed. Patient's Goal:  Journaling    Notes:  Successful     Status After Intervention:  Improved    Participation Level:  Active Listener and Interactive    Participation Quality: Appropriate and Attentive      Speech:  normal      Thought Process/Content: Logical  Linear      Affective Functioning: Congruent      Mood: euthymic      Level of consciousness:  Alert and Oriented x4      Response to Learning: Progressing to goal      Endings: None Reported    Modes of Intervention: Support      Discipline Responsible: Behavorial Health Tech      Signature:  Lenora Roper

## 2021-03-04 NOTE — SUICIDE SAFETY PLAN
SAFETY PLAN    A suicide Safety Plan is a document that supports someone when they are having thoughts of suicide. Warning Signs that indicate a suicidal crisis may be developing: What (situations, thoughts, feelings, body sensations, behaviors, etc.) do you experience that lets you know you are beginning to think about suicide? 1. Stress about what is coming  2. Fear of the unknown    Internal Coping Strategies:  What things can I do (relaxation techniques, hobbies, physical activities, etc.) to take my mind off my problems without contacting another person? 1. Counting and breathing  2. journaling  3. drawing    People and social settings that provide distraction: Who can I call or where can I go to distract me? 1. Name: Kishan Pereira  Phone: [phone]  2. Name: Mom  Phone: [phone]   3. Place: Back Deck            4. Place: The park    People whom I can ask for help: Who can I call when I need help - for example, friends, family, clergy, someone else? 1. Name: Kishan Pereira  Phone: [phone]  2. Name: Vikki  Phone: [phone]   3. Name: Ivone Landaverde  Phone: [phone]    Professionals or [address] [place] [address] Blvd I can contact during a crisis: Who can I call for help - for example, my doctor, my psychiatrist, my psychologist, a mental health provider, a suicide hotline? 1. Clinician Name: [address] [place] [address]   Address: [address] [place], [facility] [place]      Phone  #: [phone]    2. Suicide Prevention Lifeline: 7-398-263-TALK (4138)    Making the environment safe: How can I make my environment (house/apartment/living space) safer? For example, can I remove guns, medications, and other items? 1. Do not have alcohol or \"downers\" in the house    Something that is important to me and worth living for is: my family.

## 2021-03-04 NOTE — BH NOTE
[facility]  Discharge Note    Pt discharged with followings belongings:   Dentures: None  Vision - Corrective Lenses: None  Hearing Aid: None  Jewelry: Ring  Body Piercings Removed: N/A  Clothing: Footwear, Pants, Shirt, Sweater, Socks  Were All Patient Medications Collected?: Yes  Other Valuables: Cell phone, [employer], Keys(cell phone and 5 credit cards to safe)   Valuables sent home with patient. Valuables retrieved from [employer] and returned to patient. Patient education on aftercare instructions: yes. Information faxed to [address] by this writer. Patient verbalize understanding of AVS:  yes.     Status EXAM upon discharge:  Status and Exam  Normal: No  Facial Expression: Flat  Affect: Congruent  Level of Consciousness: Alert  Mood:Normal: Yes  Mood: Sad, Anxious  Motor Activity:Normal: Yes  Motor Activity: Decreased  Interview Behavior: Cooperative  Preception: [place] to Person, Silvestre Lust to Time, [place] to Place, [place] to Situation  Attention:Normal: Yes  Thought Processes: Other(See comment)(normal)  Thought Content:Normal: Yes  Hallucinations: None  Delusions: No  Memory:Normal: Yes  Insight and Judgment: No  Insight and Judgment: Poor Judgment, Poor Insight  Present Suicidal Ideation: No  Present Homicidal Ideation: No      Metabolic Screening:    Lab Results   Component Value Date    LABA1C 5.1 03/03/2021       Lab Results   Component Value Date    CHOL 234 (H) 03/03/2021    CHOL 265 (H) 08/20/2020     Lab Results   Component Value Date    TRIG 83 03/03/2021    TRIG 47 08/20/2020     Lab Results   Component Value Date    HDL 70 (H) 03/03/2021    HDL 85 (H) 08/20/2020     No components found for: [facility]  Lab Results   Component Value Date    LABVLDL 17 03/03/2021    LABVLDL 9 08/20/2020       Janet Horner RN Bridge Appointment completed: Reviewed Discharge Instructions with patient. Patient verbalizes understanding and agreement with the discharge plan using the teachback method.      Referral for Outpatient Tobacco Cessation Counseling, upon discharge (antonio X if applicable and completed):    ( )  Hospital staff assisted patient to call Quit Line or faxed referral                                   during hospitalization                  (X)  Recognizing danger situations (included triggers and roadblocks), if not completed on admission                    (X)  Coping skills (new ways to manage stress, exercise, relaxation techniques, changing routine, distraction), if not completed on admission    (X)  Basic information about quitting (benefits of quitting, techniques in how to quit, available resources, if not completed on admission  ( ) Referral for counseling faxed to [facility]   ( ) Patient refused referral  ( ) Patient refused counseling  ( ) Patient refused smoking cessation medication upon discharge    Vaccinations (antonio X if applicable and completed):  ( ) Patient states already received influenza vaccine elsewhere  ( ) Patient received influenza vaccine during this hospitalization  (X) Patient refused influenza vaccine at this time

## 2021-03-04 NOTE — GROUP NOTE
Group Therapy Note    Date: 3/4/2021    Group Start Time: 1105  Group End Time: 0553  Group Topic: [address]        Group Therapy Note    Attendees: 16    Patient's Goal: to engage in identifying negative and positive coping skills and practice identifying coping skills in music utilized to increase identification and encourage utilization of healthy coping skills. Notes:  Taylor Marcos sat quietly throughout group, however appeared to actively listen during discussions. Pt was pulled from group early. Status After Intervention:  Unchanged    Participation Level:  Active Listener    Participation Quality: Appropriate and Attentive      Speech:  normal      Thought Process/Content: Linear      Affective Functioning: Constricted/Restricted      Mood: depressed      Level of consciousness:  Alert, Oriented x4 and Attentive      Response to Learning: Able to verbalize current knowledge/experience and Progressing to goal      Endings: None Reported    Modes of Intervention: Education, Support, Socialization, Problem-solving, Activity and Media      Discipline Responsible: Psychoeducational Specialist      Signature:  LAURITA Atkinson

## 2021-03-04 NOTE — CARE COORDINATION
[facility]  Treatment Team Note  Day 3    Review Date & Time: 0900  3/4/2021    Patient was not in treatment team      Status EXAM:   Status and Exam  Normal: No  Facial Expression: Flat  Affect: Congruent  Level of Consciousness: Alert  Mood:Normal: Yes  Mood: Sad, Anxious  Motor Activity:Normal: Yes  Motor Activity: Decreased  Interview Behavior: Cooperative  Preception: [place] to Person, Sunita Sorrow to Time, [place] to Place, [place] to Situation  Attention:Normal: Yes  Thought Processes: Other(See comment)(normal)  Thought Content:Normal: Yes  Hallucinations: None  Delusions: No  Memory:Normal: Yes  Insight and Judgment: No  Insight and Judgment: Poor Judgment, Poor Insight  Present Suicidal Ideation: No  Present Homicidal Ideation: No      Suicide Risk CSSR-S:  1) Within the past month, have you wished you were dead or wished you could go to sleep and not wake up? : Yes  2) Have you actually had any thoughts of killing yourself? : Yes  3) Have you been thinking about how you might kill yourself? : Yes  5) Have you started to work out or worked out the details of how to kill yourself? Do you intend to carry out this plan? : Yes  6) Have you ever done anything, started to do anything, or prepared to do anything to end your life?: Yes      PLAN/TREATMENT RECOMMENDATIONS UPDATE: Patient will take medication as prescribed, eat 75% of meals, attend groups, participate in milieu activities, participate in treatment team and care planning for discharge and follow up. Patient discharging today, work on [facility]      Casilda Schwab, RN

## 2021-03-04 NOTE — PLAN OF CARE
Pt A&O, visible on unit gait steady free from falls, no distress noted, denies SI,HI,AVH, no distress noted calm and cooperative with care.

## 2021-03-05 NOTE — DISCHARGE SUMMARY
Discharge Summary   Admit Date: 3/1/2021   Discharge Date:  3/4/2021    Condition at DC stable  Spent over 40 minutes with patient and staff on [facility] with more than 50 % of time discussing care with patient  Final Dx: axis I: Severe episode of recurrent major depressive disorder, without psychotic features ([address]                                  PTSD. Alcohol abuse  Axis 2: No diagnosis  Alicia 3: See Medical History    And Present on Admission:   Severe episode of recurrent major depressive disorder, without psychotic features ([address]   PTSD (post-traumatic stress disorder)   Alcohol abuse   Intentional drug overdose ([address]   Tachycardia   Alcohol withdrawal syndrome with complication (HCC)     Axis 4: Problems related to the social environment  Axis 5:  On Admission: 41-50 serious symptoms At Discharge: 61-70 mild symptoms   All conditions on Axis 1 and Axis 2 and active problems on Axis 3 were treated while patient was hospitalized.  [facility] Problems    Diagnosis Date Noted    Tachycardia [R00.0]     Alcohol withdrawal syndrome with complication ([address] [C13.713]     Severe episode of recurrent major depressive disorder, without psychotic features ([address] [F33.2] 03/02/2021    PTSD (post-traumatic stress disorder) [F43.10] 03/02/2021    Intentional drug overdose ([address] Nicolestacey Gómez 03/02/2021    Alcohol abuse [F10.10] 08/20/2020   )   Condition on DC  Mood and affect are stable and pt is not suicidal   VITALS:  /75   Pulse 113   Temp 98.2 °F (36.8 °C) (Temporal)   Resp 16   Ht 5' 5\" (1.651 m)   Wt 140 lb (63.5 kg)   LMP 02/04/2019 (Exact Date)   SpO2 98%   BMI 23.30 kg/m²   Brief Summary Present Illness   CHIEF COMPLAINT:  Overdose with suicide intent.     HISTORY OF PRESENT ILLNESS:  The patient is 80-year-old female,  presented to the ED at [facility] on 03/01/2021 after an intentional ingestion of 30 Benadryl 25 mg tablets on the night of 2021. She  also drank approximately a bottle of wine during that process.     What precipitated the overdose was that the patient states she has been  feeling rather homeless with uncertain future. She stated that prior to  COVID, she was doing well, but now financially things are difficult. She stated that she is sleeping too much, is tired. Her appetite is  variable, and she has gained 10 pounds over the past 4 months. She  stated that her energy is variable, and her concentration is good. She  stated that she was getting ready for work  night for Monday and  decided to drink wine, which she typically drinks at least one to two,  but over the past year has been drinking one to one-and-a-half bottles  of wine. She then started to think that work was \"pointless. \"  She  stated that financially they are not doing well, and she does not want  to do anything. She has been feeling this way for the past year. She  stated that the kids were out of the house, and she knew that Monday  will be a bad day and had been feeling ill and overwhelmed and started  drinking at 4 o'clock on 2021. She then took a few handfuls of  Benadryl with a goal of not waking up, according to her report.   was unaware that she had taken more than her normal two Benadryl at that  night. She stated that she went to bed, was intoxicated, and fell  asleep. She woke up in the middle of the night. She could not walk and  could not see well. Her  noticed that she was impaired and took  her to the bathroom and then she went back to bed and woke up in the  morning. She told her  that she does not want to go to work and  stated that she could not see well and was fumbling around.   She then  called poison control, and they referred her to the hospital.     She still states that she wished she had  in the overdose, but states that she is not actively suicidal at this time. She has had  prior suicide attempts including seven overdoses in the past year. She  stated that she typically uses fewer than she used this time. No other  suicide attempts. Hospital Course  Patient stabilized on meds and milieu treatment. increase Effexor XR to 75 mg daily for depression. Raz Manzano is feeling somewhat better. She is active in program and has not had any SI. She is worried about her financial future as she is unable to generate enough income in the current environment. Tolerating increased Effexor XR 75 mg AM. Was anxious in her OT eval and was given Ativan which made her groggy. Discussed meeting with [facility]. Patient had no further SI. Patient was discharged to home to continue recovery in the community.    PE: (reviewed) and labs (see medical H&PE)  Labs:    Admission on 03/01/2021, Discharged on 03/04/2021   Component Date Value Ref Range Status    Cholesterol, Total 03/03/2021 234* 0 - 199 mg/dL Final    Triglycerides 03/03/2021 83  0 - 150 mg/dL Final    HDL 03/03/2021 70* 40 - 60 mg/dL Final    LDL Calculated 03/03/2021 147* <100 mg/dL Final    VLDL Cholesterol Calculated 03/03/2021 17  Not Established mg/dL Final    Hemoglobin A1C 03/03/2021 5.1  See comment % Final    Comment: Comment:  Diagnosis of Diabetes: > or = 6.5%  Increased risk of diabetes (Prediabetes): 5.7-6.4%  Glycemic Control: Nonpregnant Adults: <7.0%                    Pregnant: <6.0%        eAG 03/03/2021 99.7  mg/dL Final    TSH 03/03/2021 0.79  0.27 - 4.20 uIU/mL Final        Mental Status Exam at Discharge:  Level of consciousness:  awake  Appearance:  well-appearing, in chair, good grooming and good hygiene well-developed, well-nourished  Behavior/Motor:  no abnormalities noted normal gait and station AIMS: 0  Attitude toward examiner:  cooperative, attentive and good eye contact

## 2021-03-31 ENCOUNTER — VIRTUAL VISIT (OUTPATIENT)
Dept: PRIMARY CARE CLINIC | Age: 41
End: 2021-03-31
Payer: COMMERCIAL

## 2021-03-31 DIAGNOSIS — F10.982 ALCOHOL-INDUCED INSOMNIA (HCC): ICD-10-CM

## 2021-03-31 DIAGNOSIS — F41.9 ANXIETY: ICD-10-CM

## 2021-03-31 DIAGNOSIS — F33.2 SEVERE EPISODE OF RECURRENT MAJOR DEPRESSIVE DISORDER, WITHOUT PSYCHOTIC FEATURES (HCC): Primary | ICD-10-CM

## 2021-03-31 DIAGNOSIS — F10.10 ALCOHOL ABUSE: ICD-10-CM

## 2021-03-31 PROBLEM — F33.0 MILD EPISODE OF RECURRENT MAJOR DEPRESSIVE DISORDER (HCC): Status: RESOLVED | Noted: 2020-04-03 | Resolved: 2021-03-31

## 2021-03-31 PROCEDURE — 1111F DSCHRG MED/CURRENT MED MERGE: CPT | Performed by: FAMILY MEDICINE

## 2021-03-31 PROCEDURE — 99214 OFFICE O/P EST MOD 30 MIN: CPT | Performed by: FAMILY MEDICINE

## 2021-03-31 PROCEDURE — G8427 DOCREV CUR MEDS BY ELIG CLIN: HCPCS | Performed by: FAMILY MEDICINE

## 2021-03-31 RX ORDER — HYDROXYZINE PAMOATE 25 MG/1
25 CAPSULE ORAL 2 TIMES DAILY PRN
Qty: 60 CAPSULE | Refills: 0 | Status: SHIPPED | OUTPATIENT
Start: 2021-03-31 | End: 2021-04-26

## 2021-03-31 RX ORDER — VENLAFAXINE HYDROCHLORIDE 75 MG/1
75 CAPSULE, EXTENDED RELEASE ORAL
Qty: 30 CAPSULE | Refills: 2 | Status: SHIPPED | OUTPATIENT
Start: 2021-03-31 | End: 2021-06-28 | Stop reason: SDUPTHER

## 2021-03-31 SDOH — HEALTH STABILITY: PHYSICAL HEALTH: ON AVERAGE, HOW MANY MINUTES DO YOU ENGAGE IN EXERCISE AT THIS LEVEL?: NOT ASKED

## 2021-03-31 ASSESSMENT — ENCOUNTER SYMPTOMS
BLOOD IN STOOL: 0
SHORTNESS OF BREATH: 0
CONSTIPATION: 0
DIARRHEA: 0
ABDOMINAL PAIN: 0

## 2021-03-31 ASSESSMENT — PATIENT HEALTH QUESTIONNAIRE - PHQ9
SUM OF ALL RESPONSES TO PHQ9 QUESTIONS 1 & 2: 6
1. LITTLE INTEREST OR PLEASURE IN DOING THINGS: 3
3. TROUBLE FALLING OR STAYING ASLEEP: 3
SUM OF ALL RESPONSES TO PHQ QUESTIONS 1-9: 17
9. THOUGHTS THAT YOU WOULD BE BETTER OFF DEAD, OR OF HURTING YOURSELF: 1
SUM OF ALL RESPONSES TO PHQ QUESTIONS 1-9: 17

## 2021-03-31 ASSESSMENT — COLUMBIA-SUICIDE SEVERITY RATING SCALE - C-SSRS: 1. WITHIN THE PAST MONTH, HAVE YOU WISHED YOU WERE DEAD OR WISHED YOU COULD GO TO SLEEP AND NOT WAKE UP?: YES

## 2021-03-31 NOTE — PROGRESS NOTES
3/31/2021    TELEHEALTH EVALUATION -- Audio/Visual (During [id number] public health emergency)    Chief Complaint   Patient presents with    Suicide Attempt    Depression    Alcohol Problem    Discuss Medications     HPI:    Vicente Velez (:  1980) has requested an audio/video evaluation for the following concern(s):    -Major Depression Disorder, PTSD, Alcohol Abuse:  Doing a lot of counseling. Not working right now. They increased her Effexor and gave her generic vistaril. Running low on those. Taking 75 mg of Venlafaxine  Was taking Vistaril one a day most of time, sometimes at night would take an additional one  Feels like she is getting th help she needs  She describes herself as a work in progress. Is not doing intense outpatient treatment. Seeing therapist in [facility] 2 x a week, [place] [facility] for trauma and alcohol. Thinks things are going well. She is not really sure if medications are helping. She gets calmer when she takes the vistaril. Other than that doesn't feel much improvement. Has desire to drink/cravings. Has drank daily since she left the hospital.  Therapist encouraged her to go to AA, open to it, just hasn't yet. She thinks that drinking is the thing bothering her the most right. Desires to cut back and quit. Family is annoyed with her drinking, and so is she. Feels Guilty about drinking. Feels like she needs a drink afternoon into evening is when it hits patient, no eye opener per say. She is drinking 1-2 bottles of wine a day over past 6-12 months. Felt like group therapy was helpful but not when she got out. She feels like the  tailspin. Had more hopes for , not that it would be a light switch and be better.     PHQ-9 Total Score: 17 (3/31/2021  7:46 AM)  Thoughts that you would be better off dead, or of hurting yourself in some way: 1 (3/31/2021  7:46 AM)    Past Medical History:   Diagnosis Date    Abnormal Pap smear of cervix     Alcohol abuse 8/20/2020    Anemia     Anxiety 4/3/2020    Asthma 1/12/2011    Breast disorder     lumps removed    Post partum depression     Post partum depression     Renal cyst      Past Surgical History:   Procedure Laterality Date    BREAST SURGERY  1/2012    benign tumor removed left breast    EYE SURGERY Bilateral 2002    lasik    HYSTERECTOMY ABDOMINAL N/A 2/21/2019    DAVINCI ROBOTIC TOTAL LAPAROSCOPIC HYSTERECTOMY WITH BILATERAL SALPINGECTOMY,  WITH REMOVAL OF INTRAUTERINE DEVICE, AND RIGHT OVARIAN CYSTECTOMY                **LATEX SENSITIVE**  CPT CODE - 22265 performed by Tanisha Mortensen DO at [address]  12/13/2013    LITHOTRIPSY  1/2014    TONSILLECTOMY      T&A     Current Outpatient Medications   Medication Sig Dispense Refill    hydrOXYzine (VISTARIL) 25 MG capsule Take 1 capsule by mouth 2 times daily as needed for Anxiety 60 capsule 0    venlafaxine (EFFEXOR XR) 75 MG extended release capsule Take 1 capsule by mouth daily (with breakfast) 30 capsule 2    folic acid (FOLVITE) 1 MG tablet Take 1 tablet by mouth daily 30 tablet 0    Multiple Vitamins-Minerals (THERAPEUTIC MULTIVITAMIN-MINERALS) tablet Take 1 tablet by mouth daily 30 tablet 0    thiamine 100 MG tablet Take 1 tablet by mouth daily 30 tablet 0     No current facility-administered medications for this visit.       Allergies   Allergen Reactions    Latex Rash    Penicillins Anaphylaxis     Tolerated keflex and rocephin 1/2014    Macrobid [Nitrofurantoin] Hives and Itching    Vancomycin Itching    Ciprofloxacin Rash    Other Swelling and Rash     Cat gut suture    Sulfa Antibiotics Rash     Family History   Problem Relation Age of Onset    High Blood Pressure Mother     High Blood Pressure Maternal Grandmother     Cancer Maternal Grandmother         colon    Heart Disease Maternal Grandfather     No Known Problems Father     No Known Problems Brother     No Known Problems Paternal Grandfather    Tess Roldan No Known Problems Paternal Grandmother      Social History     Socioeconomic History    Marital status:      Spouse name: Shabnam Figueroa Number of children: 3    Years of education: 16    Highest education level: Bachelor's degree (e.g., BA, AB, BS)   Occupational History    Occupation: [employer]     Comment: brother is Dr. Ruth Ann Kohli strain: Not hard at all   Rox-Paramjit insecurity     Worry: Never true     Inability: Never true   [employer] needs     Medical: No     Non-medical: No   Tobacco Use    Smoking status: Never Smoker    Smokeless tobacco: Never Used    Tobacco comment: not applicable   Substance and Sexual Activity    Alcohol use: Yes     Alcohol/week: 14.0 standard drinks     Types: 14 Glasses of wine per week     Comment: mild alcoholic before COVID, entered treatment; always leland    Drug use: No    Sexual activity: Yes     Partners: Male   Lifestyle    Physical activity     Days per week: 0 days     Minutes per session: Not on file    Stress: Rather much   Relationships    Social connections     Talks on phone: Twice a week     Gets together: Twice a week     Attends Mor[employer] service: Never     Active member of club or organization: No     Attends meetings of clubs or organizations: Never     Relationship status:     Intimate partner violence     Fear of current or ex partner: No     Emotionally abused: No     Physically abused: No     Forced sexual activity: No   Other Topics Concern    Not on file   Social History Narrative    Parents, brother spouses    Sae Betancourt 39    3 children    17 y/o Rashmiitz Wilder 15 y/o son Joanne Gautam    5 y.o Gabbi Span 4th Grade [employer]         Review of Systems   Constitutional: Negative for chills, fever and unexpected weight change. Eyes: Negative for visual disturbance. Respiratory: Negative for shortness of breath. Cardiovascular: Negative for chest pain.    Gastrointestinal: Negative for abdominal pain, blood in stool, constipation and diarrhea. Endocrine: Negative for polyuria. Genitourinary: Negative for dysuria and hematuria. Musculoskeletal: Negative for arthralgias. Skin: Negative for rash. Neurological: Negative for weakness, numbness and headaches. Psychiatric/Behavioral: Positive for dysphoric mood and sleep disturbance. The patient is nervous/anxious. Patient-Reported Vitals 3/31/2021   Patient-Reported Weight 135lb      Physical Exam  Constitutional:       Appearance: Normal appearance. She is not ill-appearing. HENT:      Head: Normocephalic and atraumatic. Eyes:      Extraocular Movements: Extraocular movements intact. Pulmonary:      Effort: Pulmonary effort is normal.   Neurological:      General: No focal deficit present. Mental Status: She is alert and oriented to person, place, and time. Mental status is at baseline. Psychiatric:         Mood and Affect: Mood is anxious and depressed. Affect is tearful. Behavior: Behavior is slowed. Thought Content: Thought content normal.         Judgment: Judgment is impulsive. ASSESSMENT/PLAN:  Adam Cowan is 37 y/o female who was seen virtually for suicide attempt, depression, alcohol problem and discuss medications. Hospital records reviewed from admission from 3/1 through 3/5/2020 for suicidal attempt and patient took 25-30 diphenhydramine. Patient still severely depressed. Patient upset that she did not succeed in her attempt. No current plan or active suicidal ideation. Recommend patient do intensive outpatient group therapy program, alcohols Anonymous, and consider addiction treatment. Recommend alcohol cessation, consider acamprosate, naltrexone, or Topamax as agents to help curb desire to drink. Patient understood plan and verbalized this and agreeable to plan. She will continue to see her therapist twice a week for the time being as well.   Follow-up in 4 weeks.    1. Severe episode of recurrent major depressive disorder, without psychotic features ([address]  PHQ-9 Total Score: 17 (3/31/2021  7:46 AM)  Thoughts that you would be better off dead, or of hurting yourself in some way: 1 (3/31/2021  7:46 AM)  On the basis of positive PHQ-9 screening (PHQ-9 Total Score: 17), the following plan was implemented: additional evaluation and assessment performed and referral to Behavioral health provided. Patient will follow-up in 4 week(s) with PCP.  -Continue Venlafaxine 75 mg daily  - hydrOXYzine (VISTARIL) 25 MG capsule; Take 1 capsule by mouth 2 times daily as needed for Anxiety  Dispense: 60 capsule; Refill: 0  [facility] PHP/IOP Group Therapy Program    2. Anxiety  -continue   - hydrOXYzine (VISTARIL) 25 MG capsule; Take 1 capsule by mouth 2 times daily as needed for Anxiety  Dispense: 60 capsule; Refill: 0    3. Alcohol abuse  -encouraged cessation, offered IOP/Group therapy, recommended AA, medications for alcohol cessation discussed and reviewed with patient  [facility] PHP/IOP Group Therapy Program    4. Alcohol-induced insomnia (HCC)  - hydrOXYzine (VISTARIL) 25 MG capsule; Take 1 capsule by mouth 2 times daily as needed for Anxiety  Dispense: 60 capsule; Refill: 0      Return in about 4 weeks (around 4/28/2021). Jarod Fuchssusi, was evaluated through a synchronous (real-time) audio-video encounter. The patient (or guardian if applicable) is aware that this is a billable service. Verbal consent to proceed has been obtained within the past 12 months. The visit was conducted pursuant to the emergency declaration under the [facility] waiver authority and the [employer] and [place] General Act. Patient identification was verified, and a caregiver was present when appropriate. The patient was located in a state where the provider was credentialed to provide care.     \"THIS VISIT WAS COMPLETED VIRTUALLY USING DOXY. ME\"    Total time spent on this encounter: Not billed by time    --Chloé Woody. Rebecca Durand., DO on 3/31/2021 at 10:01 AM    An electronic signature was used to authenticate this note.

## 2021-04-25 DIAGNOSIS — F10.982 ALCOHOL-INDUCED INSOMNIA (HCC): ICD-10-CM

## 2021-04-25 DIAGNOSIS — F33.2 SEVERE EPISODE OF RECURRENT MAJOR DEPRESSIVE DISORDER, WITHOUT PSYCHOTIC FEATURES (HCC): ICD-10-CM

## 2021-04-25 DIAGNOSIS — F41.9 ANXIETY: ICD-10-CM

## 2021-04-26 RX ORDER — HYDROXYZINE PAMOATE 25 MG/1
CAPSULE ORAL
Qty: 60 CAPSULE | Refills: 0 | Status: SHIPPED | OUTPATIENT
Start: 2021-04-26 | End: 2021-05-25

## 2021-05-25 DIAGNOSIS — F41.9 ANXIETY: ICD-10-CM

## 2021-05-25 DIAGNOSIS — F33.2 SEVERE EPISODE OF RECURRENT MAJOR DEPRESSIVE DISORDER, WITHOUT PSYCHOTIC FEATURES (HCC): ICD-10-CM

## 2021-05-25 DIAGNOSIS — F10.982 ALCOHOL-INDUCED INSOMNIA (HCC): ICD-10-CM

## 2021-05-25 RX ORDER — HYDROXYZINE PAMOATE 25 MG/1
CAPSULE ORAL
Qty: 60 CAPSULE | Refills: 0 | Status: SHIPPED | OUTPATIENT
Start: 2021-05-25 | End: 2021-06-28 | Stop reason: SDUPTHER

## 2021-06-05 ENCOUNTER — NURSE TRIAGE (OUTPATIENT)
Dept: OTHER | Facility: CLINIC | Age: 41
End: 2021-06-05

## 2021-06-05 NOTE — TELEPHONE ENCOUNTER
Received call from Otto Lynch at pre-service [facility]) Vera with Red Flag Complaint. Brief description of triage: Tattoo to left upper arm, has been looking infected for 5 days. Triage indicates for patient to be seen within 24 hours. Advised patient to go to [facility] if unable to get appointment. Patient states she will go to [facility]. Read list of allergies to patient by request.    Care advice provided, patient verbalizes understanding; denies any other questions or concerns; instructed to call back for any new or worsening symptoms. Attention Provider: Thank you for allowing me to participate in the care of your patient. The patient was connected to triage in response to information provided to the [facility]. Please do not respond through this encounter as the response is not directed to a shared pool. Reason for Disposition   Other signs of wound infection    Answer Assessment - Initial Assessment Questions  1. LOCATION: \"Where is the wound located? \"       Upper left arm    2. WOUND APPEARANCE: \"What does the wound look like? \"       Yellow/orange, \"cratered,\" open    3. SIZE: If redness is present, ask: \"What is the size of the red area? \" (Inches, centimeters, or compare to size of a coin)       Unsure, tattoo around it is red    4. SPREAD: \"What's changed in the last day? \"  \"Do you see any red streaks coming from the wound? \"      No    5. ONSET: \"When did it start to look infected? \"       About 11 days ago got new tattoo, started to look infected 5 days ago    6. MECHANISM: \"How did the wound start, what was the cause? \"      Tattoo 11 days ago    7. PAIN: \"Is there any pain? \" If so, ask: \"How bad is the pain? \"   (Scale 1-10; or mild, moderate, severe)      Tender to touch    8. FEVER: \"Do you have a fever? \" If so, ask: \"What is your temperature, how was it measured, and when did it start? \"      No    9. OTHER SYMPTOMS: \"Do you have any other symptoms? \" (e.g., shaking chills, weakness, rash elsewhere on body)      Nausea    10. PREGNANCY: \"Is there any chance you are pregnant? \" \"When was your last menstrual period? \"        No    Protocols used: WOUND INFECTION-ADULT-AH

## 2021-06-07 NOTE — TELEPHONE ENCOUNTER
Called and spoke with pt. Pt went to urgent care over the weekend and was put on keflex for a week. Pt will give a call back once antibiotics are finished to let us know if infection has gotten better or not.

## 2021-06-15 ENCOUNTER — TELEPHONE (OUTPATIENT)
Dept: PRIMARY CARE CLINIC | Age: 41
End: 2021-06-15

## 2021-06-15 NOTE — TELEPHONE ENCOUNTER
We could consider adding an antipsychotic medication but without having maxed out the doseing of the effexor I would be hesistant. Perhaps considering an increase in dose of effexor would be safer despite weight gain or weaning off of it and look for alternative.   Probably best discussed/treated in appointment with myself or psychiatry

## 2021-06-15 NOTE — TELEPHONE ENCOUNTER
Patient is calling stating that she is still having 2-3 days out of about every 6 weeks that she just still cant get out of bed and would like to know what she can do she would like to not up her venlafaxine (EFFEXOR XR) 75 MG extended release capsule because it is making her gain weight she would like to know if there is something else she can do on top on the medication     Please advise  Nicolle Luong [phone]

## 2021-06-24 DIAGNOSIS — F10.982 ALCOHOL-INDUCED INSOMNIA (HCC): ICD-10-CM

## 2021-06-24 DIAGNOSIS — F41.9 ANXIETY: ICD-10-CM

## 2021-06-24 DIAGNOSIS — F33.2 SEVERE EPISODE OF RECURRENT MAJOR DEPRESSIVE DISORDER, WITHOUT PSYCHOTIC FEATURES (HCC): ICD-10-CM

## 2021-06-25 RX ORDER — VENLAFAXINE HYDROCHLORIDE 75 MG/1
75 CAPSULE, EXTENDED RELEASE ORAL
Qty: 30 CAPSULE | Refills: 2 | OUTPATIENT
Start: 2021-06-25

## 2021-06-25 RX ORDER — HYDROXYZINE PAMOATE 25 MG/1
CAPSULE ORAL
Qty: 60 CAPSULE | Refills: 0 | OUTPATIENT
Start: 2021-06-25

## 2021-06-28 RX ORDER — VENLAFAXINE HYDROCHLORIDE 75 MG/1
75 CAPSULE, EXTENDED RELEASE ORAL
Qty: 30 CAPSULE | Refills: 2 | Status: SHIPPED | OUTPATIENT
Start: 2021-06-28 | End: 2021-08-24 | Stop reason: DRUGHIGH

## 2021-06-28 RX ORDER — HYDROXYZINE PAMOATE 25 MG/1
CAPSULE ORAL
Qty: 60 CAPSULE | Refills: 0 | Status: SHIPPED | OUTPATIENT
Start: 2021-06-28 | End: 2021-07-26

## 2021-06-29 ENCOUNTER — OFFICE VISIT (OUTPATIENT)
Dept: PRIMARY CARE CLINIC | Age: 41
End: 2021-06-29
Payer: COMMERCIAL

## 2021-06-29 VITALS
HEIGHT: 65 IN | OXYGEN SATURATION: 100 % | HEART RATE: 101 BPM | DIASTOLIC BLOOD PRESSURE: 86 MMHG | TEMPERATURE: 97.2 F | SYSTOLIC BLOOD PRESSURE: 128 MMHG | BODY MASS INDEX: 24.36 KG/M2 | WEIGHT: 146.2 LBS

## 2021-06-29 DIAGNOSIS — Z13.31 POSITIVE DEPRESSION SCREENING: ICD-10-CM

## 2021-06-29 DIAGNOSIS — F33.1 MODERATE EPISODE OF RECURRENT MAJOR DEPRESSIVE DISORDER (HCC): ICD-10-CM

## 2021-06-29 DIAGNOSIS — F43.23 ADJUSTMENT REACTION WITH ANXIETY AND DEPRESSION: ICD-10-CM

## 2021-06-29 DIAGNOSIS — F41.9 ANXIETY: ICD-10-CM

## 2021-06-29 DIAGNOSIS — F10.10 ALCOHOL ABUSE: Primary | ICD-10-CM

## 2021-06-29 PROCEDURE — G8431 POS CLIN DEPRES SCRN F/U DOC: HCPCS | Performed by: FAMILY MEDICINE

## 2021-06-29 PROCEDURE — 1036F TOBACCO NON-USER: CPT | Performed by: FAMILY MEDICINE

## 2021-06-29 PROCEDURE — G8427 DOCREV CUR MEDS BY ELIG CLIN: HCPCS | Performed by: FAMILY MEDICINE

## 2021-06-29 PROCEDURE — 99214 OFFICE O/P EST MOD 30 MIN: CPT | Performed by: FAMILY MEDICINE

## 2021-06-29 PROCEDURE — G8420 CALC BMI NORM PARAMETERS: HCPCS | Performed by: FAMILY MEDICINE

## 2021-06-29 RX ORDER — NALTREXONE HYDROCHLORIDE 50 MG/1
50 TABLET, FILM COATED ORAL
Qty: 30 TABLET | Refills: 0 | Status: SHIPPED | OUTPATIENT
Start: 2021-06-29 | End: 2021-07-29

## 2021-06-29 ASSESSMENT — PATIENT HEALTH QUESTIONNAIRE - PHQ9
SUM OF ALL RESPONSES TO PHQ QUESTIONS 1-9: 13
SUM OF ALL RESPONSES TO PHQ9 QUESTIONS 1 & 2: 3
7. TROUBLE CONCENTRATING ON THINGS, SUCH AS READING THE NEWSPAPER OR WATCHING TELEVISION: 3
3. TROUBLE FALLING OR STAYING ASLEEP: 3
SUM OF ALL RESPONSES TO PHQ QUESTIONS 1-9: 13
8. MOVING OR SPEAKING SO SLOWLY THAT OTHER PEOPLE COULD HAVE NOTICED. OR THE OPPOSITE, BEING SO FIGETY OR RESTLESS THAT YOU HAVE BEEN MOVING AROUND A LOT MORE THAN USUAL: 1
SUM OF ALL RESPONSES TO PHQ QUESTIONS 1-9: 13
5. POOR APPETITE OR OVEREATING: 2
2. FEELING DOWN, DEPRESSED OR HOPELESS: 2
10. IF YOU CHECKED OFF ANY PROBLEMS, HOW DIFFICULT HAVE THESE PROBLEMS MADE IT FOR YOU TO DO YOUR WORK, TAKE CARE OF THINGS AT HOME, OR GET ALONG WITH OTHER PEOPLE: 2
6. FEELING BAD ABOUT YOURSELF - OR THAT YOU ARE A FAILURE OR HAVE LET YOURSELF OR YOUR FAMILY DOWN: 1
1. LITTLE INTEREST OR PLEASURE IN DOING THINGS: 1
9. THOUGHTS THAT YOU WOULD BE BETTER OFF DEAD, OR OF HURTING YOURSELF: 0

## 2021-06-29 ASSESSMENT — ENCOUNTER SYMPTOMS
SORE THROAT: 0
RHINORRHEA: 0
BLOOD IN STOOL: 0
CONSTIPATION: 0
SHORTNESS OF BREATH: 0
DIARRHEA: 0
ABDOMINAL PAIN: 0

## 2021-06-29 ASSESSMENT — ANXIETY QUESTIONNAIRES
7. FEELING AFRAID AS IF SOMETHING AWFUL MIGHT HAPPEN: 3-NEARLY EVERY DAY
5. BEING SO RESTLESS THAT IT IS HARD TO SIT STILL: 1-SEVERAL DAYS
2. NOT BEING ABLE TO STOP OR CONTROL WORRYING: 2-OVER HALF THE DAYS
6. BECOMING EASILY ANNOYED OR IRRITABLE: 1-SEVERAL DAYS
3. WORRYING TOO MUCH ABOUT DIFFERENT THINGS: 2-OVER HALF THE DAYS
GAD7 TOTAL SCORE: 13
1. FEELING NERVOUS, ANXIOUS, OR ON EDGE: 2-OVER HALF THE DAYS
4. TROUBLE RELAXING: 2-OVER HALF THE DAYS

## 2021-06-29 NOTE — PATIENT INSTRUCTIONS
-Increase Effexor to another 37.5 mg daily for a week, then an additional 37.5 mg (75 mg total) additional for a total of 150 mg daily in 2 weeks; if need more medication let me know    -Start naltrexone 50 mg at after work each day to help curb your drinking    -Consider local AA for now    -Schedule with Dr. Johnson Hernández at our office    -follow up with me in 6 weeks to check in and reassess where we are at

## 2021-06-29 NOTE — PROGRESS NOTES
Chief Complaint   Patient presents with    Alcohol Problem    Anxiety    Depression    Medication Check     HPI:  Cheryl Cisneros is 35 y/o female here today for depression, alcohol abuse, PTSD:  -Started working at beginning of April 5th. Had Suicide attempt, inpatient admission on 3/1/21. Struggles with work life balance per patient. Works excessively. Works from home. Feels like she has to work 90 days without taking any time  Motivated to be the best.  Drinking has picked back up. Drinking 1-1.5 bottles of wine after work each day. Desires to stop drinking. Afraid to go through withdrawals and not be able to work. Sleeping like mad per patient; Working from [employer] to 6 pm, sleeping from 7pm to 7am  Has had 10 lbs of weight gain and incredibly frustrating for her. Has been drinking alcohol and then misusing the vistaril taking 2 of them to help her go to sleep. Taking the Effexor 75 mg daily at breakfast.  States she feels like she is a slightly better place. She has suicidal thoughts but no thoughts. Has regrets that it didn't work.     Wt Readings from Last 3 Encounters:   06/29/21 146 lb 3.2 oz (66.3 kg)   03/01/21 140 lb (63.5 kg)   03/01/21 140 lb (63.5 kg)     PHQ-9 Total Score: 13 (6/29/2021 11:10 AM)  Thoughts that you would be better off dead, or of hurting yourself in some way: 0 (6/29/2021 11:10 AM)    HEATHER 7 SCORE 6/29/2021 8/20/2020 4/3/2020   HEATHER-7 Total Score 13 8 6     Past Medical History:   Diagnosis Date    Abnormal Pap smear of cervix     Alcohol abuse 8/20/2020    Anemia     Anxiety 4/3/2020    Asthma 1/12/2011    Breast disorder     lumps removed    Post partum depression     Post partum depression     Renal cyst      Past Surgical History:   Procedure Laterality Date    BREAST SURGERY  1/2012    benign tumor removed left breast    EYE SURGERY Bilateral 2002    lasik    HYSTERECTOMY ABDOMINAL N/A 2/21/2019    DAVINCI ROBOTIC TOTAL LAPAROSCOPIC HYSTERECTOMY WITH depressed. Speech: Speech normal.         Thought Content: Thought content does not include homicidal or suicidal ideation. Judgment: Judgment is impulsive. Assessment and Plan:  Hugh Cavanaugh is 37 y/o female who was seen today for alcohol problem, anxiety, depression and medication check. 1. Alcohol abuse  - naltrexone (DEPADE) 50 MG tablet; Take 1 tablet bymouth Daily with supper  Dispense: 30 tablet; Refill: 0    2. Adjustment reaction with anxiety and depression  3. Anxiety  4. Moderate episode of recurrent major depressive disorder ([facility] [address]  5. Positive depression screening  -Increase Effexor to another 37.5 mg daily for a week, then an additional 37.5 mg (75 mg total) additional for a total of 150 mg daily in 2 weeks; if need more medication let me know  -Start naltrexone 50 mg at after work each day to help curb your drinking  -Consider local AA for now  -Schedule with Dr. Zachary Mcdaniels at our office  -follow up with me in 6 weeks to check in and reassess where we are at  - Positive Screen for Clinical Depression with a Documented Follow-up Plan        Depression: At risk    PHQ-2 Score: 13     On the basis of positive PHQ-9 screening (PHQ-9 Total Score: 13), the following plan was implemented: additional evaluation and assessment performed, referral to Behavioral health provided and medication prescribed - patient will call for any significant medication side effects or worsening symptoms of depression. Patient will follow-up in 6 week(s) with PCP and see psychology in interim. Return in about 6 weeks (around 8/10/2021). Spent 30-39 minutes of face to face interaction with patient counseling on diagnoses and treatment plan; including but not limited to pre visit planning, chart/lab review, new orders, instructions, charting      Adriano Brand.  Janet Michel., DO  6/29/2021

## 2021-07-02 ENCOUNTER — OFFICE VISIT (OUTPATIENT)
Dept: BEHAVIORAL/MENTAL HEALTH CLINIC | Age: 41
End: 2021-07-02
Payer: COMMERCIAL

## 2021-07-02 DIAGNOSIS — F33.2 SEVERE EPISODE OF RECURRENT MAJOR DEPRESSIVE DISORDER, WITHOUT PSYCHOTIC FEATURES (HCC): ICD-10-CM

## 2021-07-02 DIAGNOSIS — R45.89 SUICIDE RISK: ICD-10-CM

## 2021-07-02 DIAGNOSIS — F41.1 GENERALIZED ANXIETY DISORDER: ICD-10-CM

## 2021-07-02 DIAGNOSIS — Z91.51 HISTORY OF SUICIDE ATTEMPT: ICD-10-CM

## 2021-07-02 DIAGNOSIS — T14.90XA TRAUMA IN CHILDHOOD: ICD-10-CM

## 2021-07-02 DIAGNOSIS — F10.20 ALCOHOL USE DISORDER, SEVERE, IN CONTROLLED ENVIRONMENT, DEPENDENCE (HCC): Primary | ICD-10-CM

## 2021-07-02 PROCEDURE — 90791 PSYCH DIAGNOSTIC EVALUATION: CPT | Performed by: COUNSELOR

## 2021-07-04 ASSESSMENT — COLUMBIA-SUICIDE SEVERITY RATING SCALE - C-SSRS
6. HAVE YOU EVER DONE ANYTHING, STARTED TO DO ANYTHING, OR PREPARED TO DO ANYTHING TO END YOUR LIFE?: YES
3. HAVE YOU BEEN THINKING ABOUT HOW YOU MIGHT KILL YOURSELF?: NO
5. HAVE YOU STARTED TO WORK OUT OR WORKED OUT THE DETAILS OF HOW TO KILL YOURSELF? DO YOU INTEND TO CARRY OUT THIS PLAN?: NO
4. HAVE YOU HAD THESE THOUGHTS AND HAD SOME INTENTION OF ACTING ON THEM?: NO
7. DID THIS OCCUR IN THE LAST THREE MONTHS: NO
1. WITHIN THE PAST MONTH, HAVE YOU WISHED YOU WERE DEAD OR WISHED YOU COULD GO TO SLEEP AND NOT WAKE UP?: YES
2. HAVE YOU ACTUALLY HAD ANY THOUGHTS OF KILLING YOURSELF?: YES

## 2021-07-04 ASSESSMENT — PATIENT HEALTH QUESTIONNAIRE - PHQ9
4. FEELING TIRED OR HAVING LITTLE ENERGY: 3
6. FEELING BAD ABOUT YOURSELF - OR THAT YOU ARE A FAILURE OR HAVE LET YOURSELF OR YOUR FAMILY DOWN: 1
8. MOVING OR SPEAKING SO SLOWLY THAT OTHER PEOPLE COULD HAVE NOTICED. OR THE OPPOSITE, BEING SO FIGETY OR RESTLESS THAT YOU HAVE BEEN MOVING AROUND A LOT MORE THAN USUAL: 0
3. TROUBLE FALLING OR STAYING ASLEEP: 3
10. IF YOU CHECKED OFF ANY PROBLEMS, HOW DIFFICULT HAVE THESE PROBLEMS MADE IT FOR YOU TO DO YOUR WORK, TAKE CARE OF THINGS AT HOME, OR GET ALONG WITH OTHER PEOPLE: 2
SUM OF ALL RESPONSES TO PHQ QUESTIONS 1-9: 15
1. LITTLE INTEREST OR PLEASURE IN DOING THINGS: 3
SUM OF ALL RESPONSES TO PHQ9 QUESTIONS 1 & 2: 6
9. THOUGHTS THAT YOU WOULD BE BETTER OFF DEAD, OR OF HURTING YOURSELF: 2
2. FEELING DOWN, DEPRESSED OR HOPELESS: 3
SUM OF ALL RESPONSES TO PHQ QUESTIONS 1-9: 17
5. POOR APPETITE OR OVEREATING: 0
SUM OF ALL RESPONSES TO PHQ QUESTIONS 1-9: 17
7. TROUBLE CONCENTRATING ON THINGS, SUCH AS READING THE NEWSPAPER OR WATCHING TELEVISION: 2

## 2021-07-05 NOTE — PROGRESS NOTES
[facility]    Time Start: 2:16pm   Time End: 3:16pm  Date of Service:  7/2/2021    Basis of Evaluation:   [x]  Clinical Interview  [x]  Review of Medical Record    [x] Patient Health Questionnaire (PHQ)  []  Interview family member    Presenting Concerns:  Eliott Collet is a 36 y.o. who was referred by her primary care physician, Darline Barron. Radha Landin., DO, due to concerns about alcohol use      Mingo Ham reported the following symptoms of depression: depressed mood, difficulty concentrating, fatigue, hopelessness, hypersomnia, psychomotor agitation, recurrent thoughts of death, suicidal attempt and weight gain. She also reported loss of energy, sense of worthlessness, sense of hopelessness, irritability, pessimism, lack of pleasure, too much sleep, sad mood, loss of interest, anxiousness and suicidal thoughts. In addition, Mingo Ham reported symptoms of excessive worry, agitation, fearfulness and depression. There is some evidence of ailyn. Further, Mingo Ham reports transitioning from consuming 6 glasses of wine/night for over a year, down to 2 glasses of wine/night for the past two weeks \"to thwart withdrawal symptoms. \"    She reported that her symptoms began since the beginning of pandemic (drinking), and depression/anxiety have always been an issue. Additional exacerbating stressors include family issues (refers to herself as the \"failure of the family\" due to mental health issues, being fired from brother's business, and having 3 marriages), financial concern (upset that  doesn't \"contribute more\" financially) and employment concern (performance at work has gone down the past 2 week, and she does not have time off work available to seek inpatient alcohol treatment. ). Previous behavioral health treatment history: In Patient (attempted suicide in February, 2021; 72hr) and 1 [facility] outpatient therapy \"for a couple of weeks. \" Mingo Ham has a current medication list which includes the following prescription(s): naltrexone, venlafaxine, hydroxyzine, folic acid, therapeutic multivitamin-minerals, and thiamine. PHQ Scores 6/29/2021 3/31/2021 8/20/2020 4/3/2020 6/21/2017   PHQ2 Score 3 6 - 2 0   PHQ9 Score 13 17 1 4 0     Interpretation of Total Score Depression Severity: 1-4 = Minimal depression, 5-9 = Mild depression, 10-14 = Moderate depression, 15-19 = Moderately severe depression, 20-27 = Severe depression      Objective:  Mental Status:  Appearance: age appropriate, casually dressed and within normal Limits   Affect:  consistent with expectations based upon mood   Attitude: Cooperative and Engageable   Mood:   Dysphoric and Anxious   Thought Process: Tangential and goal directed   Delusions: no evidence of delusions   Perceptions: No perceptual disturbance   Behavior:   open, cooperative, restless and tearful   Psychomotor: Within normal limits   Speech:   Pressured   Eye Contact: good   Orientation:  oriented to person, place, time, and general circumstances   Judgment & Insight:  normal insight and judgment     Risk Assessment:  Current Suicide Risk:  suicidal ideation with no plan or intent, nonsuicidal morbid ideation (  Current Homicide Risk:  no homocidal ideation    Fran Graf reported previous suicide attempt. Last suicide attempt occurred February, 2021, where Fran Graf consumed Stendal pills\" with alcohol. She reported feeling dizzy and eventually telling her , Alis Garcia, and then him transporting her to the hospital for evaluation and treatment. She states that she is \"bummed\" she \"failed at that too. \" Previous to this attempt, Fran Graf reports she attempted suicide \"over a decade ago while  to my first . \" Did not provide additional details. Social History/Functioning:  Fran Graf is currently living with family (, Alis Garcia; \"I like that he is 57yrs old because he's like a dad\"; Son, Jack Kraft - 10yrs old;  Son, Yadi - 11yo; Daughter, Manju - 12yo)  and reported little social support (, Nguyen Bunch; \"I don't have any friends and my family makes me feel like a black sheep failure. \"). She denied any current cultural or spiritual concerns. Social History     Socioeconomic History    Marital status:      Spouse name: Javier Cintron Number of children: 3    Years of education: 16    Highest education level: Bachelor's degree (e.g., BA, AB, BS)   Occupational History    Occupation: [employer]     Comment: brother is Dr. Clover Cruz     Employer: [employer] Occupation: Digital Marketing Sales     Comment: [employer]   Tobacco Use    Smoking status: Never Smoker    Smokeless tobacco: Never Used    Tobacco comment: not applicable   Vaping Use    Vaping Use: Never used   Substance and Sexual Activity    Alcohol use: Yes     Alcohol/week: 14.0 standard drinks     Types: 14 Glasses of wine per week     Comment: mild alcoholic before COVID, entered treatment; always leland    Drug use: No    Sexual activity: Yes     Partners: Male   Other Topics Concern    Not on file   Social History Narrative    Parents, brother spouses        [address]        3 children    15 y/o [facility] 15 y/o son Colin Damian    5 y.o [employer] 4th [employer]         Social Determinants of Health     Financial Resource Strain: Low Risk     Difficulty of Paying Living Expenses: Not hard at all   Food Insecurity: No Food Insecurity    Worried About [address] in the Last Year: Never true    [address] in the Last Year: Never true   Transportation Needs: No Transportation Needs    Lack of Transportation (Medical): No    Lack of Transportation (Non-Medical): No   Physical Activity: Unknown    Days of Exercise per Week: 0 days    Minutes of Exercise per Session: Not asked   Stress: Stress Concern Present    Feeling of Stress : Rather much   Social Connections:  Moderately Isolated    Frequency of Communication with Friends and Family: Twice a week    Frequency of Social Gatherings with Friends and Family: Twice a week    Attends Amish Services: Never    Active Member of Clubs or Organizations: No    Attends Club or Organization Meetings: Never    Marital Status:    Intimate Partner Violence: Not At Risk    Fear of Current or Ex-Partner: No    Emotionally Abused: No    Physically Abused: No    Sexually Abused: No       Past Medical History:   Diagnosis Date    Abnormal Pap smear of cervix     Alcohol abuse 8/20/2020    Anemia     Anxiety 4/3/2020    Asthma 1/12/2011    Breast disorder     lumps removed    Post partum depression     Post partum depression     Renal cyst        Diagnosis:   Diagnosis Orders   1. Alcohol use disorder, severe, in controlled environment, dependence ([facility]     2. Severe episode of recurrent major depressive disorder, without psychotic features ([facility] [address]     3. Generalized anxiety disorder     4. Suicide risk     5. History of suicide attempt     6. Trauma in childhood         Strengths: Motivated for treatment   Limitations: History of poor follow through with treatment and Not particularly psychologically minded    Patient Response to Plan/Recommendations: Today, we discussed psychoeducation of treatment for alcohol use disorder, depression, anxiety, and trauma history. Primary goals of therapy were collaboratively identified as decrease symptoms of alcohol use, depression, and anxiety; process history of trauma. Discussed confidentiality and limits of confidentiality as it applies to treatment within [facility] and my role as member of the medical treatment team.     Assessment, Impressions and Plan:  Hitesh Charles is a typical 36 y.o. old female who presents with moderately severe depression, anxiety, and alcohol use symptoms that are interfering with her family and social functioning. Hitesh Charles expresses fair insight into her symptoms.  Hitesh Charles will likely benefit from Cognitive Behavioral therapy to challenge irrational thoughts, Psychoanalysis for insight development, and Dialectical Behavioral Therapy to address emotional management. Her symptoms are in the moderately severe range and she will likely need 16-18 therapy sessions. Initial Treatment Plan/Recommendations:  No follow-ups on file. Contact [facility] as needed.       Electronically signed by jennifer [facility] Center[facility]ita, LPCC-S  Licensed Professional Clinical Counselor  Director of [address] [facility] and [facility] Medicine Residency Program at [facility]

## 2021-07-09 ENCOUNTER — OFFICE VISIT (OUTPATIENT)
Dept: BEHAVIORAL/MENTAL HEALTH CLINIC | Age: 41
End: 2021-07-09
Payer: COMMERCIAL

## 2021-07-09 DIAGNOSIS — F41.1 GENERALIZED ANXIETY DISORDER: ICD-10-CM

## 2021-07-09 DIAGNOSIS — R45.89 SUICIDE RISK: ICD-10-CM

## 2021-07-09 DIAGNOSIS — Z91.51 HISTORY OF SUICIDE ATTEMPT: ICD-10-CM

## 2021-07-09 DIAGNOSIS — F10.20 ALCOHOL USE DISORDER, SEVERE, IN CONTROLLED ENVIRONMENT, DEPENDENCE (HCC): Primary | ICD-10-CM

## 2021-07-09 DIAGNOSIS — R40.0 SLEEPINESS: ICD-10-CM

## 2021-07-09 DIAGNOSIS — R68.82 LOW LIBIDO: ICD-10-CM

## 2021-07-09 DIAGNOSIS — T14.90XA TRAUMA IN CHILDHOOD: ICD-10-CM

## 2021-07-09 DIAGNOSIS — F33.2 SEVERE EPISODE OF RECURRENT MAJOR DEPRESSIVE DISORDER, WITHOUT PSYCHOTIC FEATURES (HCC): ICD-10-CM

## 2021-07-09 PROCEDURE — 90837 PSYTX W PT 60 MINUTES: CPT | Performed by: COUNSELOR
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
## 2021-07-24 DIAGNOSIS — F33.2 SEVERE EPISODE OF RECURRENT MAJOR DEPRESSIVE DISORDER, WITHOUT PSYCHOTIC FEATURES (HCC): ICD-10-CM

## 2021-07-24 DIAGNOSIS — F10.982 ALCOHOL-INDUCED INSOMNIA (HCC): ICD-10-CM

## 2021-07-24 DIAGNOSIS — F41.9 ANXIETY: ICD-10-CM

## 2021-07-26 RX ORDER — HYDROXYZINE PAMOATE 25 MG/1
CAPSULE ORAL
Qty: 60 CAPSULE | Refills: 0 | Status: SHIPPED | OUTPATIENT
Start: 2021-07-26 | End: 2021-08-23

## 2021-07-30 ENCOUNTER — OFFICE VISIT (OUTPATIENT)
Dept: BEHAVIORAL/MENTAL HEALTH CLINIC | Age: 41
End: 2021-07-30
Payer: COMMERCIAL

## 2021-07-30 DIAGNOSIS — Z91.51 HISTORY OF SUICIDE ATTEMPT: ICD-10-CM

## 2021-07-30 DIAGNOSIS — F41.1 GENERALIZED ANXIETY DISORDER: ICD-10-CM

## 2021-07-30 DIAGNOSIS — R68.82 LOW LIBIDO: ICD-10-CM

## 2021-07-30 DIAGNOSIS — T14.90XA TRAUMA IN CHILDHOOD: ICD-10-CM

## 2021-07-30 DIAGNOSIS — F33.2 SEVERE EPISODE OF RECURRENT MAJOR DEPRESSIVE DISORDER, WITHOUT PSYCHOTIC FEATURES (HCC): ICD-10-CM

## 2021-07-30 DIAGNOSIS — F10.20 ALCOHOL USE DISORDER, SEVERE, IN CONTROLLED ENVIRONMENT, DEPENDENCE (HCC): Primary | ICD-10-CM

## 2021-07-30 PROCEDURE — 90837 PSYTX W PT 60 MINUTES: CPT | Performed by: COUNSELOR

## 2021-07-30 NOTE — PROGRESS NOTES
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 Though she has to drive to work in-person now and has taken what she shared as a 30k pay cut, she is elated to not have to do her current job, and was happy to submit her week notice. Heber Cherry starts work next Monday and travels to [place], [place] for the entire work for orientation. She is nervous because she has never traveled 2hrs in a vehicle by herself, and communicated, \"I've never gone this long without adult supervision. \" Clinician discussed more about her anxiety related to the upcoming trip, and provided psychoeducation on the affects of childhood trauma where she may demonstrate child-like developmental responses to adult experiences as an adult. Further, she shared the anxiety that surrounds her pay cut and not knowing how all of her finances will be managed. Clinician discussed Denny Martinez ability to create a budget that outlines her expenses, and to evaluate it against her home income. She was agreeable to complete between now and next week. Further, Heber Cherry described that her drinking has increased from 6-7 drinks/day, typically from 4pm-9pm. She reports drinking more now because she has purchased boxed wine vs. Bottles of wine where she could better limit her intake. Heber Cherry was asked to describe her anxiety and depression, as it relates to alcohol abuse, and she was unable to identify anything specific. Clinician invited Heber Cherry to journal each day to highlight her thoughts and feelings, and share back next session. She is invited to lean into the discomfort of the anxiety and depression, rather than seek alcohol as coping mechanism. Heber Cherry was agreeable.      In addition to creating a monthly budget and daily journal, Heber Cherry was invited to exercise daily, and to begin conversations with her  that include Marlon Saeed sharing her needs regarding effective support to abstain from alcohol. This is to include the level of accountability she requires from him when she purchases alcohol. She was agreeable, though said he is \"like a father\" to her and will do whatever she asks of him, even if it changes the initial agreement. Follow-up next session. Objective:  Mental Status:  Appearance: age appropriate, casually dressed and within normal Limits   Affect:  consistent with expectations based upon mood   Attitude: Cooperative and Engageable   Mood:   Dysphoric, Apathetic and Anxious   Thought Process:  Linear and goal directed   Delusions: no evidence of delusions   Perceptions: No perceptual disturbance   Behavior:   open, cooperative, restless and tearful   Psychomotor: Within normal limits   Speech: Within normal limits   Eye Contact: good   Orientation:  oriented to person, place, time, and general circumstances   Judgment & Insight:  normal insight and judgment     Risk Assessment:  Current Suicide Risk:  no suicidal ideation, nonsuicidal morbid ideation  Current Homicide Risk:  no homocidal ideation    Diagnosis:   Diagnosis Orders   1. Alcohol use disorder, severe, in controlled environment, dependence ([facility] [address]     2. Severe episode of recurrent major depressive disorder, without psychotic features ([facility] [address]     3. Generalized anxiety disorder     4. History of suicide attempt     5. Trauma in childhood     6. Low libido         Plan/Recommendations:  Continue ongoing clinical treatment using evidence-based techniques for alcohol use disorder, depression, anxiety, and trauma history.  Primary goals of therapy are collaboratively identified as decrease symptoms of alcohol use, depression, and anxiety; process history of trauma, \"relax perfectionism\", improved communication with . Continue use of Cognitive Behavioral therapy to challenge irrational thoughts, Psychoanalysis for insight development, and Dialectical Behavioral Therapy to address emotional management.       Electronically signed by Amie Phalen, Ed.D., LPCC-S  Licensed Professional Clinical Counselor  Director of [address] [facility] and [facility] Medicine Residency Program at [facility]

## 2021-08-23 DIAGNOSIS — F10.982 ALCOHOL-INDUCED INSOMNIA (HCC): ICD-10-CM

## 2021-08-23 DIAGNOSIS — F33.2 SEVERE EPISODE OF RECURRENT MAJOR DEPRESSIVE DISORDER, WITHOUT PSYCHOTIC FEATURES (HCC): ICD-10-CM

## 2021-08-23 DIAGNOSIS — F41.9 ANXIETY: ICD-10-CM

## 2021-08-23 RX ORDER — HYDROXYZINE PAMOATE 25 MG/1
CAPSULE ORAL
Qty: 60 CAPSULE | Refills: 0 | Status: SHIPPED | OUTPATIENT
Start: 2021-08-23 | End: 2021-09-22

## 2021-08-24 ENCOUNTER — OFFICE VISIT (OUTPATIENT)
Dept: PRIMARY CARE CLINIC | Age: 41
End: 2021-08-24
Payer: COMMERCIAL

## 2021-08-24 VITALS
DIASTOLIC BLOOD PRESSURE: 86 MMHG | SYSTOLIC BLOOD PRESSURE: 122 MMHG | HEIGHT: 65 IN | BODY MASS INDEX: 25.49 KG/M2 | WEIGHT: 153 LBS | OXYGEN SATURATION: 98 % | HEART RATE: 84 BPM | TEMPERATURE: 98.1 F

## 2021-08-24 DIAGNOSIS — F41.9 ANXIETY: ICD-10-CM

## 2021-08-24 DIAGNOSIS — F33.1 MODERATE EPISODE OF RECURRENT MAJOR DEPRESSIVE DISORDER (HCC): ICD-10-CM

## 2021-08-24 DIAGNOSIS — T50.905A WEIGHT GAIN DUE TO MEDICATION: Primary | ICD-10-CM

## 2021-08-24 DIAGNOSIS — F33.2 SEVERE EPISODE OF RECURRENT MAJOR DEPRESSIVE DISORDER, WITHOUT PSYCHOTIC FEATURES (HCC): ICD-10-CM

## 2021-08-24 DIAGNOSIS — F43.10 PTSD (POST-TRAUMATIC STRESS DISORDER): ICD-10-CM

## 2021-08-24 DIAGNOSIS — R63.5 WEIGHT GAIN DUE TO MEDICATION: Primary | ICD-10-CM

## 2021-08-24 DIAGNOSIS — F10.10 ALCOHOL ABUSE: ICD-10-CM

## 2021-08-24 PROCEDURE — G8419 CALC BMI OUT NRM PARAM NOF/U: HCPCS | Performed by: FAMILY MEDICINE

## 2021-08-24 PROCEDURE — 1036F TOBACCO NON-USER: CPT | Performed by: FAMILY MEDICINE

## 2021-08-24 PROCEDURE — 99214 OFFICE O/P EST MOD 30 MIN: CPT | Performed by: FAMILY MEDICINE

## 2021-08-24 PROCEDURE — G8427 DOCREV CUR MEDS BY ELIG CLIN: HCPCS | Performed by: FAMILY MEDICINE

## 2021-08-24 RX ORDER — VENLAFAXINE HYDROCHLORIDE 37.5 MG/1
CAPSULE, EXTENDED RELEASE ORAL
Qty: 90 CAPSULE | Refills: 0 | Status: SHIPPED | OUTPATIENT
Start: 2021-08-24 | End: 2021-09-22 | Stop reason: SDUPTHER

## 2021-08-24 ASSESSMENT — ENCOUNTER SYMPTOMS
RHINORRHEA: 0
BLOOD IN STOOL: 0
BACK PAIN: 0
EYE PAIN: 0
SHORTNESS OF BREATH: 0
EYE DISCHARGE: 0
COLOR CHANGE: 0
SORE THROAT: 0
WHEEZING: 0
DIARRHEA: 0
ABDOMINAL PAIN: 0
CONSTIPATION: 0

## 2021-08-24 ASSESSMENT — PATIENT HEALTH QUESTIONNAIRE - PHQ9
SUM OF ALL RESPONSES TO PHQ9 QUESTIONS 1 & 2: 0
SUM OF ALL RESPONSES TO PHQ QUESTIONS 1-9: 0
2. FEELING DOWN, DEPRESSED OR HOPELESS: 0
SUM OF ALL RESPONSES TO PHQ QUESTIONS 1-9: 0
1. LITTLE INTEREST OR PLEASURE IN DOING THINGS: 0
SUM OF ALL RESPONSES TO PHQ QUESTIONS 1-9: 0

## 2021-08-24 NOTE — Clinical Note
Patient significant weight gain on increased doses of Effexor. Think patient would benefit therapeutically for being on higher dose SNRI IMO. Patient with 15 to 20 pounds of weight gain the last 6 months on higher dose. Offered patient to wean down the lower dose of Effexor but would recommend switch is not at therapeutic dose. Wonder what your thoughts might be as far as me changing to a different SNRI, as patient not able to tolerate a couple of SSRIs previously?

## 2021-08-24 NOTE — PATIENT INSTRUCTIONS
Taper down to 37.5 mg daily, take 75 mg for one week, then 37.5 mg daily after    Will send in 37.5 mg tablets    therapeutic dosing is 75 mg so may want to consider alternative medication going forward    Continue CBT with Dr. Wil Rea    Continue cut back and alcohol cessation goal    Log food using [employer]  Goal [id number] calories a day    Follow up in 6 weeks to see how you are doing and for med check/weight check

## 2021-10-22 DIAGNOSIS — F41.9 ANXIETY: ICD-10-CM

## 2021-10-22 DIAGNOSIS — F33.2 SEVERE EPISODE OF RECURRENT MAJOR DEPRESSIVE DISORDER, WITHOUT PSYCHOTIC FEATURES (HCC): ICD-10-CM

## 2021-10-22 DIAGNOSIS — F10.982 ALCOHOL-INDUCED INSOMNIA (HCC): ICD-10-CM

## 2021-10-22 RX ORDER — HYDROXYZINE PAMOATE 25 MG/1
CAPSULE ORAL
Qty: 60 CAPSULE | Refills: 0 | Status: SHIPPED | OUTPATIENT
Start: 2021-10-22 | End: 2021-12-27

## 2021-10-22 NOTE — TELEPHONE ENCOUNTER
Refill Request     Last Seen: 8/24/2021    Last Written: 9/22/21    Next Appointment:   Future Appointments   Date Time Provider Sandra Vazquez   10/28/2021  7:30 AM Re Parker.  Loyda Bar., DO [facility] AND [facility]             Requested Prescriptions     Pending Prescriptions Disp Refills    hydrOXYzine (VISTARIL) 25 MG capsule [Pharmacy Med Name: HYDROXYZINE PAMOATE 25MG CAPSULES] 60 capsule 0     Sig: TAKE 1 CAPSULE BY MOUTH TWICE DAILY AS NEEDED FOR ANXIETY

## 2021-10-28 ENCOUNTER — TELEMEDICINE (OUTPATIENT)
Dept: PRIMARY CARE CLINIC | Age: 41
End: 2021-10-28
Payer: COMMERCIAL

## 2021-10-28 DIAGNOSIS — R63.5 WEIGHT GAIN: ICD-10-CM

## 2021-10-28 DIAGNOSIS — F43.10 PTSD (POST-TRAUMATIC STRESS DISORDER): ICD-10-CM

## 2021-10-28 DIAGNOSIS — Z12.31 ENCOUNTER FOR SCREENING MAMMOGRAM FOR MALIGNANT NEOPLASM OF BREAST: ICD-10-CM

## 2021-10-28 DIAGNOSIS — F41.9 ANXIETY: ICD-10-CM

## 2021-10-28 DIAGNOSIS — F33.1 MODERATE EPISODE OF RECURRENT MAJOR DEPRESSIVE DISORDER (HCC): ICD-10-CM

## 2021-10-28 DIAGNOSIS — F10.10 ALCOHOL ABUSE: Primary | ICD-10-CM

## 2021-10-28 PROBLEM — F43.23 ADJUSTMENT REACTION WITH ANXIETY AND DEPRESSION: Status: RESOLVED | Noted: 2020-08-20 | Resolved: 2021-10-28

## 2021-10-28 PROCEDURE — G8427 DOCREV CUR MEDS BY ELIG CLIN: HCPCS | Performed by: FAMILY MEDICINE

## 2021-10-28 PROCEDURE — 99213 OFFICE O/P EST LOW 20 MIN: CPT | Performed by: FAMILY MEDICINE

## 2021-10-28 ASSESSMENT — PATIENT HEALTH QUESTIONNAIRE - PHQ9
2. FEELING DOWN, DEPRESSED OR HOPELESS: 0
SUM OF ALL RESPONSES TO PHQ QUESTIONS 1-9: 0
9. THOUGHTS THAT YOU WOULD BE BETTER OFF DEAD, OR OF HURTING YOURSELF: 0
5. POOR APPETITE OR OVEREATING: 0
SUM OF ALL RESPONSES TO PHQ QUESTIONS 1-9: 0
SUM OF ALL RESPONSES TO PHQ QUESTIONS 1-9: 0
3. TROUBLE FALLING OR STAYING ASLEEP: 0
4. FEELING TIRED OR HAVING LITTLE ENERGY: 0
1. LITTLE INTEREST OR PLEASURE IN DOING THINGS: 0
6. FEELING BAD ABOUT YOURSELF - OR THAT YOU ARE A FAILURE OR HAVE LET YOURSELF OR YOUR FAMILY DOWN: 0
8. MOVING OR SPEAKING SO SLOWLY THAT OTHER PEOPLE COULD HAVE NOTICED. OR THE OPPOSITE, BEING SO FIGETY OR RESTLESS THAT YOU HAVE BEEN MOVING AROUND A LOT MORE THAN USUAL: 0
7. TROUBLE CONCENTRATING ON THINGS, SUCH AS READING THE NEWSPAPER OR WATCHING TELEVISION: 0
SUM OF ALL RESPONSES TO PHQ9 QUESTIONS 1 & 2: 0
10. IF YOU CHECKED OFF ANY PROBLEMS, HOW DIFFICULT HAVE THESE PROBLEMS MADE IT FOR YOU TO DO YOUR WORK, TAKE CARE OF THINGS AT HOME, OR GET ALONG WITH OTHER PEOPLE: 0

## 2021-10-28 ASSESSMENT — ANXIETY QUESTIONNAIRES
7. FEELING AFRAID AS IF SOMETHING AWFUL MIGHT HAPPEN: 0-NOT AT ALL
6. BECOMING EASILY ANNOYED OR IRRITABLE: 3-NEARLY EVERY DAY
3. WORRYING TOO MUCH ABOUT DIFFERENT THINGS: 0-NOT AT ALL
GAD7 TOTAL SCORE: 3
5. BEING SO RESTLESS THAT IT IS HARD TO SIT STILL: 0-NOT AT ALL
4. TROUBLE RELAXING: 0-NOT AT ALL
2. NOT BEING ABLE TO STOP OR CONTROL WORRYING: 0-NOT AT ALL
1. FEELING NERVOUS, ANXIOUS, OR ON EDGE: 0-NOT AT ALL

## 2021-10-28 ASSESSMENT — ENCOUNTER SYMPTOMS
DIARRHEA: 0
BLOOD IN STOOL: 0
CONSTIPATION: 0
SHORTNESS OF BREATH: 0
ABDOMINAL PAIN: 0

## 2021-10-28 NOTE — PROGRESS NOTES
SUBJECTIVE/OBJECTIVE:    Chief Complaint   Patient presents with    3 Month Follow-Up    Depression    Anxiety    Alcohol Problem    Health Maintenance     HPI   Lili Beck is 40 y/o female who presents today for 6 week follow up of depression, anxiety, and alcohol abuse:    -Depression, Anxiety, and Alcohol abuse and Weight gain:  Decreased Effexor to 37.5 mg after last OV 2 months ago due to concerns of weight gain related to higher doses  Takes hydroxyzine PRN very rarely  Notices being a little more irritable and getting irritated easier  Not overreacting  Feels like she is feeling again, where she was numb before  Not effecting home or work or social life  Doing well, doesn't drink alcohol during the week  Eating clean and being active  Has glass or 2 of wine on Friday/Saturday night and doesn't feel well next morning  Doesn't want to go back to being hung over all the time  With the irritability feels like anxiety is there but not undue amount  Trying to intentionally do behaviors that don't involve drinking alcohol like going to grocery or store in evening rather than morning, couldn't do this before because she was drinking  Has lost 11 pounds, now 142 lbs; was being really clean, hammered down this month per patient  Desires to lose 6 pounds more  Exercising as well, moving more and eating fruits, vegetables, proteins, lower carbohydrate diet, drinking a lot of water  PHQ-9 Total Score: 0 (10/28/2021  7:50 AM)  Thoughts that you would be better off dead, or of hurting yourself in some way: 0 (10/28/2021  7:50 AM)    HEATHER 7 SCORE 10/28/2021 6/29/2021 8/20/2020 4/3/2020   HEATHER-7 Total Score 3 13 8 6     Review of Systems   Constitutional: Negative for chills, fever and unexpected weight change. Eyes: Negative for visual disturbance. Respiratory: Negative for shortness of breath. Cardiovascular: Negative for chest pain.    Gastrointestinal: Negative for abdominal pain, blood in stool, constipation and diarrhea. Endocrine: Negative for polyuria. Genitourinary: Negative for dysuria and hematuria. Musculoskeletal: Negative for arthralgias. Skin: Negative for rash. Neurological: Negative for weakness, numbness and headaches. Psychiatric/Behavioral: Positive for dysphoric mood. The patient is nervous/anxious. Patient-Reported Vitals 10/28/2021   Patient-Reported Weight 142lb   Patient-Reported Height 5 5      Physical Exam  Constitutional:       Appearance: Normal appearance. She is not ill-appearing. HENT:      Head: Normocephalic and atraumatic. Eyes:      Extraocular Movements: Extraocular movements intact. Pulmonary:      Effort: Pulmonary effort is normal.   Neurological:      General: No focal deficit present. Mental Status: She is alert and oriented to person, place, and time. Mental status is at baseline. Psychiatric:         Mood and Affect: Mood normal.         Behavior: Behavior normal.         Thought Content:  Thought content normal.       Lab Results   Component Value Date    CHOL 234 (H) 03/03/2021    CHOL 265 (H) 08/20/2020     Lab Results   Component Value Date    TRIG 83 03/03/2021    TRIG 47 08/20/2020     Lab Results   Component Value Date    HDL 70 (H) 03/03/2021    HDL 85 (H) 08/20/2020     Lab Results   Component Value Date    LDLCALC 147 (H) 03/03/2021    LDLCALC 171 (H) 08/20/2020     Lab Results   Component Value Date    LABVLDL 17 03/03/2021    LABVLDL 9 08/20/2020     No results found for: [facility]  Lab Results   Component Value Date     03/01/2021    K 3.8 03/01/2021     03/01/2021    CO2 26 03/01/2021    BUN 11 03/01/2021    CREATININE 0.8 03/01/2021    GLUCOSE 98 03/01/2021    CALCIUM 9.9 03/01/2021    PROT 7.9 03/01/2021    LABALBU 4.8 03/01/2021    BILITOT <0.2 03/01/2021    ALKPHOS 62 03/01/2021    AST 18 03/01/2021    ALT 13 03/01/2021    LABGLOM >60 03/01/2021    GFRAA >60 03/01/2021    AGRATIO 1.5 03/01/2021    GLOB 3.1 03/01/2021     The 10-year ASCVD risk score ([place] Fischer, et al., 2013) is: 0.5%    Values used to calculate the score:      Age: 39 years      Sex: Female      Is Non- : No      Diabetic: No      Tobacco smoker: No      Systolic Blood Pressure: 648 mmHg      Is BP treated: No      HDL Cholesterol: 70 mg/dL      Total Cholesterol: 234 mg/dL    Assessment and Plan:  Neris Cabrales is 38 y/o female who is here for 3 month follow-up, depression, anxiety, alcohol problem and health maintenance. 1. Alcohol abuse  -patient has cut back significantly has 4-6 glasses of wine on weekends  -no longer drinking during week  -feels like she is in good place  -patient does not feel guilt, annoyance, need to cut back, or need for eye opener    2. Anxiety  3. Moderate episode of recurrent major depressive disorder ([facility] [address]  4. PTSD (post-traumatic stress disorder)  Symptoms well controlled  Saw behavioral health for CBT in July, August  Feels well on currend dose Effexor 37.5 mg daily; decreased after symptom improvement and due to side effects of weight gain  PHQ-9 Total Score: 0 (10/28/2021  7:50 AM)  Thoughts that you would be better off dead, or of hurting yourself in some way: 0 (10/28/2021  7:50 AM)    HEATHER 7 SCORE 10/28/2021 6/29/2021 8/20/2020 4/3/2020   HEATHER-7 Total Score 3 13 8 6     5. Weight gain  142 lbs 10/28/2021   Wt Readings from Last 3 Encounters:   08/24/21 153 lb (69.4 kg)   06/29/21 146 lb 3.2 oz (66.3 kg)   03/01/21 140 lb (63.5 kg)   Has lost 11 lbs since last OV with   -Recommend 150 minutes of cardiovascular activity a week, or 10,000 to 15,000 steps a day, 2 days of weightbearing  -dietary wise, try to stick to your weight x 10 in calories a day  -avoid processed/refined carbohydrates (boxed/canned/frozen/fast)  -encourage healthy protein and fat, butter, avocado, egg    6.  Encounter for screening mammogram for malignant neoplasm of breast  - TORY DIGITAL SCREEN W OR WO CAD No BILATERAL; Future    Declines, flu, Tdap, PCV23, and booster dose of COVID 19 vaccine    Spent 20-29 minutes of face to face interaction with patient counseling on diagnoses and treatment plan; including but not limited to pre visit planning, chart/lab review, new orders, instructions, charting      \"THIS VISIT WAS COMPLETED VIRTUALLY USING DOXY. ME\"    Return in about 6 months (around 4/28/2022) for annual exam.    Sumi Aguirre, was evaluated through a synchronous (real-time) audio-video encounter. The patient (or guardian if applicable) is aware that this is a billable service. Verbal consent to proceed has been obtained within the past 12 months. The visit was conducted pursuant to the emergency declaration under the [facility], [address] [place] [address] [facility] authority and the [employer] Act. Patient identification was verified, and a caregiver was present when appropriate. The patient was located in a state where the provider was credentialed to provide care. An electronic signature was used to authenticate this note. Helen Ramirez.  [place] Citizen., DO   10/28/2021

## 2021-12-21 DIAGNOSIS — F10.982 ALCOHOL-INDUCED INSOMNIA (HCC): ICD-10-CM

## 2021-12-21 DIAGNOSIS — F33.2 SEVERE EPISODE OF RECURRENT MAJOR DEPRESSIVE DISORDER, WITHOUT PSYCHOTIC FEATURES (HCC): ICD-10-CM

## 2021-12-21 DIAGNOSIS — F41.9 ANXIETY: ICD-10-CM

## 2021-12-27 RX ORDER — HYDROXYZINE PAMOATE 25 MG/1
CAPSULE ORAL
Qty: 60 CAPSULE | Refills: 0 | Status: SHIPPED | OUTPATIENT
Start: 2021-12-27 | End: 2022-01-20

## 2022-01-17 ENCOUNTER — TELEPHONE (OUTPATIENT)
Dept: PRIMARY CARE CLINIC | Age: 42
End: 2022-01-17

## 2022-01-17 ENCOUNTER — VIRTUAL VISIT (OUTPATIENT)
Dept: PRIMARY CARE CLINIC | Age: 42
End: 2022-01-17
Payer: COMMERCIAL

## 2022-01-17 DIAGNOSIS — H65.91 RIGHT NON-SUPPURATIVE OTITIS MEDIA: ICD-10-CM

## 2022-01-17 DIAGNOSIS — H92.01 OTALGIA, RIGHT EAR: Primary | ICD-10-CM

## 2022-01-17 PROCEDURE — G8427 DOCREV CUR MEDS BY ELIG CLIN: HCPCS | Performed by: FAMILY MEDICINE

## 2022-01-17 PROCEDURE — 99214 OFFICE O/P EST MOD 30 MIN: CPT | Performed by: FAMILY MEDICINE

## 2022-01-17 RX ORDER — CEFDINIR 300 MG/1
300 CAPSULE ORAL 2 TIMES DAILY
Qty: 20 CAPSULE | Refills: 0 | Status: SHIPPED | OUTPATIENT
Start: 2022-01-17 | End: 2022-01-27

## 2022-01-17 SDOH — ECONOMIC STABILITY: FOOD INSECURITY: WITHIN THE PAST 12 MONTHS, YOU WORRIED THAT YOUR FOOD WOULD RUN OUT BEFORE YOU GOT MONEY TO BUY MORE.: NEVER TRUE

## 2022-01-17 SDOH — ECONOMIC STABILITY: FOOD INSECURITY: WITHIN THE PAST 12 MONTHS, THE FOOD YOU BOUGHT JUST DIDN'T LAST AND YOU DIDN'T HAVE MONEY TO GET MORE.: NEVER TRUE

## 2022-01-17 ASSESSMENT — ENCOUNTER SYMPTOMS
COUGH: 1
SINUS PRESSURE: 0
VOMITING: 1
VOICE CHANGE: 0
EYE DISCHARGE: 0
SHORTNESS OF BREATH: 0
WHEEZING: 0
SORE THROAT: 0
EYE ITCHING: 0
CHEST TIGHTNESS: 0
TROUBLE SWALLOWING: 0
CONSTIPATION: 0
NAUSEA: 0
BLOOD IN STOOL: 0
ABDOMINAL PAIN: 0
DIARRHEA: 1

## 2022-01-17 ASSESSMENT — SOCIAL DETERMINANTS OF HEALTH (SDOH): HOW HARD IS IT FOR YOU TO PAY FOR THE VERY BASICS LIKE FOOD, HOUSING, MEDICAL CARE, AND HEATING?: SOMEWHAT HARD

## 2022-01-17 NOTE — TELEPHONE ENCOUNTER
PT tested positive for covid last Tuesday. PT thinks she has a ear infection. She is having sharp pain. She took a home test today and is still positive. She has not been checking her temp and is still having moderate covid symptoms.

## 2022-01-17 NOTE — PATIENT INSTRUCTIONS
cefdinir  Pronunciation:  CARMEN jeffery  Brand:  [employer] Omni-Pac  What is the most important information I should know about cefdinir? Do not take this medicine if you are allergic to cefdinir, or to similar antibiotics, such as Ceftin, Cefzil, Keflex, and others. What is cefdinir? Cefdinir is a cephalosporin (SEF a low spor in) antibiotic that is used to treat many different types of infections caused by bacteria. Cefdinir may also be used for purposes not listed in this medication guide. What should I discuss with my healthcare provider before taking cefdinir? You should not take this medicine if you are allergic to cefdinir or any other cephalosporin antibiotic (cefadroxil, cefprozil, cefazolin, cefalexin, Keflex, and others). Tell your doctor if you have ever had:  · kidney disease (or if you are on dialysis);  · intestinal problems, such as colitis; or  · an allergy to any drugs (especially penicillins). Cefdinir liquid contains sucrose. Talk to your doctor before using this form of cefdinir if you have diabetes. Tell your doctor if you are pregnant or breastfeeding. How should I take cefdinir? Follow all directions on your prescription label and read all medicine guides or instruction sheets. Use the medicine exactly as directed. Shake the oral suspension (liquid) before you measure a dose. Use the dosing syringe provided, or use a medicine dose-measuring device (not a kitchen spoon). You may take cefdinir with or without food. Use this medicine for the full prescribed length of time, even if your symptoms quickly improve. Skipping doses can increase your risk of infection that is resistant to medication. Cefdinir will not treat a viral infection such as the flu or a common cold. Cefdinir can affect the results of certain medical tests. Tell any doctor who treats you that you are using cefdinir. Store at room temperature away from moisture and heat.  Throw away any unused cefdinir liquid that is older than 10 days. What happens if I miss a dose? Take the medicine as soon as you can, but skip the missed dose if it is almost time for your next dose. Do not take two doses at one time. What happens if I overdose? Seek emergency medical attention or call the Poison Help line at 1-390.750.2714. Overdose symptoms may include nausea, vomiting, stomach pain, diarrhea, or a seizure. What should I avoid while taking cefdinir? Avoid using antacids or mineral supplements that contain aluminum, magnesium, or iron within 2 hours before or after taking cefdinir. Antacids or iron can make it harder for your body to absorb cefdinir. This does not include baby formula fortified with iron. Antibiotic medicines can cause diarrhea, which may be a sign of a new infection. If you have diarrhea that is watery or bloody, call your doctor before using anti-diarrhea medicine. What are the possible side effects of cefdinir? Get emergency medical help if you have signs of an allergic reaction (hives, difficult breathing, swelling in your face or throat) or a severe skin reaction (fever, sore throat, burning eyes, skin pain, red or purple skin rash with blistering and peeling). Call your doctor at once if you have:  · severe stomach pain, diarrhea that is watery or bloody (even if it occurs months after your last dose);  · fever, chills, body aches, flu symptoms;  · pale skin, easy bruising, unusual bleeding;  · seizure (convulsions);  · fever, weakness, confusion;  · dark colored urine, jaundice (yellowing of the skin or eyes); or  · kidney problems --little or no urination, swelling in your feet or ankles, feeling tired or short of breath. Common side effects may include:  · nausea, vomiting, stomach pain, diarrhea;  · vaginal itching or discharge;  · headache; or  · rash (including diaper rash in an infant taking liquid cefdinir. This is not a complete list of side effects and others may occur.  Call your doctor for medical advice about side effects. You may report side effects to FDA at 6-885-FDA-0873. What other drugs will affect cefdinir? Tell your doctor about all your other medicines, especially:  · probenecid; or  · vitamin or mineral supplements that contain iron. This list is not complete. Other drugs may affect cefdinir, including prescription and over-the-counter medicines, vitamins, and herbal products. Not all possible drug interactions are listed here. Where can I get more information? Your pharmacist can provide more information about cefdinir. Remember, keep this and all other medicines out of the reach of children, never share your medicines with others, and use this medication only for the indication prescribed. Every effort has been made to ensure that the information provided by [address] is accurate, up-to-date, and complete, but no guarantee is made to that effect. Drug information contained herein may be time sensitive. [facility] information has been compiled for use by healthcare practitioners and consumers in the [place] and therefore [facility] does not warrant that uses outside of the [place] are appropriate, unless specifically indicated otherwise. [facility]'s drug information does not endorse drugs, diagnose patients or recommend therapy. [facility] drug information is an informational resource designed to assist licensed healthcare practitioners in caring for their patients and/or to serve consumers viewing this service as a supplement to, and not a substitute for, the expertise, skill, knowledge and judgment of healthcare practitioners. The absence of a warning for a given drug or drug combination in no way should be construed to indicate that the drug or drug combination is safe, effective or appropriate for any given patient. [facility] does not assume any responsibility for any aspect of healthcare administered with the aid of information [facility] provides.  The information contained herein is not intended to cover all possible uses, directions, precautions, warnings, drug interactions, allergic reactions, or adverse effects. If you have questions about the drugs you are taking, check with your doctor, nurse or pharmacist.  Copyright 9191-8911 [address] [place]. Version: 7.03. Revision date: 1/4/2021. Care instructions adapted under license by [facility] [address] If you have questions about a medical condition or this instruction, always ask your healthcare professional. [place] [address] any warranty or liability for your use of this information.

## 2022-01-19 ENCOUNTER — NURSE TRIAGE (OUTPATIENT)
Dept: OTHER | Facility: CLINIC | Age: 42
End: 2022-01-19

## 2022-01-19 ENCOUNTER — APPOINTMENT (OUTPATIENT)
Dept: GENERAL RADIOLOGY | Age: 42
End: 2022-01-19
Payer: COMMERCIAL

## 2022-01-19 ENCOUNTER — HOSPITAL ENCOUNTER (EMERGENCY)
Age: 42
Discharge: HOME | End: 2022-01-19
Payer: COMMERCIAL

## 2022-01-19 VITALS
RESPIRATION RATE: 18 BRPM | HEART RATE: 116 BPM | WEIGHT: 140 LBS | DIASTOLIC BLOOD PRESSURE: 100 MMHG | TEMPERATURE: 98.6 F | SYSTOLIC BLOOD PRESSURE: 159 MMHG | BODY MASS INDEX: 23.3 KG/M2 | OXYGEN SATURATION: 99 %

## 2022-01-19 DIAGNOSIS — U07.1 COVID-19: Primary | ICD-10-CM

## 2022-01-19 DIAGNOSIS — Z87.09 HISTORY OF ASTHMA: ICD-10-CM

## 2022-01-19 LAB
A/G RATIO: 1.4 (ref 1.1–2.2)
ALBUMIN SERPL-MCNC: 4.3 G/DL (ref 3.4–5)
ALP BLD-CCNC: 68 U/L (ref 40–129)
ALT SERPL-CCNC: 15 U/L (ref 10–40)
ANION GAP SERPL CALCULATED.3IONS-SCNC: 11 MMOL/L (ref 3–16)
AST SERPL-CCNC: 18 U/L (ref 15–37)
BASOPHILS ABSOLUTE: 0 K/UL (ref 0–0.2)
BASOPHILS RELATIVE PERCENT: 0.6 %
BILIRUB SERPL-MCNC: <0.2 MG/DL (ref 0–1)
BILIRUBIN URINE: NEGATIVE
BLOOD, URINE: NEGATIVE
BUN BLDV-MCNC: 14 MG/DL (ref 7–20)
CALCIUM SERPL-MCNC: 9.3 MG/DL (ref 8.3–10.6)
CHLORIDE BLD-SCNC: 100 MMOL/L (ref 99–110)
CLARITY: CLEAR
CO2: 26 MMOL/L (ref 21–32)
COLOR: YELLOW
CREAT SERPL-MCNC: 0.8 MG/DL (ref 0.6–1.1)
D DIMER: <200 NG/ML DDU (ref 0–229)
EKG ATRIAL RATE: 102 BPM
EKG DIAGNOSIS: NORMAL
EKG P AXIS: 48 DEGREES
EKG P-R INTERVAL: 140 MS
EKG Q-T INTERVAL: 338 MS
EKG QRS DURATION: 82 MS
EKG QTC CALCULATION (BAZETT): 440 MS
EKG R AXIS: -10 DEGREES
EKG T AXIS: 17 DEGREES
EKG VENTRICULAR RATE: 102 BPM
EOSINOPHILS ABSOLUTE: 0.1 K/UL (ref 0–0.6)
EOSINOPHILS RELATIVE PERCENT: 2.1 %
GFR AFRICAN AMERICAN: >60
GFR NON-AFRICAN AMERICAN: >60
GLUCOSE BLD-MCNC: 117 MG/DL (ref 70–99)
GLUCOSE URINE: NEGATIVE MG/DL
HCG QUALITATIVE: NEGATIVE
HCT VFR BLD CALC: 39.9 % (ref 36–48)
HEMOGLOBIN: 13.5 G/DL (ref 12–16)
KETONES, URINE: NEGATIVE MG/DL
LEUKOCYTE ESTERASE, URINE: NEGATIVE
LYMPHOCYTES ABSOLUTE: 2 K/UL (ref 1–5.1)
LYMPHOCYTES RELATIVE PERCENT: 33.6 %
MCH RBC QN AUTO: 33.9 PG (ref 26–34)
MCHC RBC AUTO-ENTMCNC: 33.8 G/DL (ref 31–36)
MCV RBC AUTO: 100.2 FL (ref 80–100)
MICROSCOPIC EXAMINATION: NORMAL
MONOCYTES ABSOLUTE: 0.4 K/UL (ref 0–1.3)
MONOCYTES RELATIVE PERCENT: 7.1 %
NEUTROPHILS ABSOLUTE: 3.3 K/UL (ref 1.7–7.7)
NEUTROPHILS RELATIVE PERCENT: 56.6 %
NITRITE, URINE: NEGATIVE
PDW BLD-RTO: 13.6 % (ref 12.4–15.4)
PH UA: 8 (ref 5–8)
PLATELET # BLD: 257 K/UL (ref 135–450)
PMV BLD AUTO: 7.8 FL (ref 5–10.5)
POTASSIUM REFLEX MAGNESIUM: 3.8 MMOL/L (ref 3.5–5.1)
PRO-BNP: 22 PG/ML (ref 0–124)
PROTEIN UA: NEGATIVE MG/DL
RBC # BLD: 3.98 M/UL (ref 4–5.2)
SODIUM BLD-SCNC: 137 MMOL/L (ref 136–145)
SPECIFIC GRAVITY UA: 1.01 (ref 1–1.03)
TOTAL PROTEIN: 7.4 G/DL (ref 6.4–8.2)
TROPONIN: <0.01 NG/ML
URINE REFLEX TO CULTURE: NORMAL
URINE TYPE: NORMAL
UROBILINOGEN, URINE: 0.2 E.U./DL
WBC # BLD: 5.9 K/UL (ref 4–11)

## 2022-01-19 PROCEDURE — 96374 THER/PROPH/DIAG INJ IV PUSH: CPT

## 2022-01-19 PROCEDURE — 80053 COMPREHEN METABOLIC PANEL: CPT

## 2022-01-19 PROCEDURE — 84703 CHORIONIC GONADOTROPIN ASSAY: CPT

## 2022-01-19 PROCEDURE — 93005 ELECTROCARDIOGRAM TRACING: CPT | Performed by: PHYSICIAN ASSISTANT

## 2022-01-19 PROCEDURE — 99283 EMERGENCY DEPT VISIT LOW MDM: CPT

## 2022-01-19 PROCEDURE — 83880 ASSAY OF NATRIURETIC PEPTIDE: CPT

## 2022-01-19 PROCEDURE — 2580000003 HC RX 258: Performed by: PHYSICIAN ASSISTANT

## 2022-01-19 PROCEDURE — 81003 URINALYSIS AUTO W/O SCOPE: CPT

## 2022-01-19 PROCEDURE — 85379 FIBRIN DEGRADATION QUANT: CPT

## 2022-01-19 PROCEDURE — 6360000002 HC RX W HCPCS: Performed by: PHYSICIAN ASSISTANT

## 2022-01-19 PROCEDURE — 71045 X-RAY EXAM CHEST 1 VIEW: CPT

## 2022-01-19 PROCEDURE — 85025 COMPLETE CBC W/AUTO DIFF WBC: CPT

## 2022-01-19 PROCEDURE — 96375 TX/PRO/DX INJ NEW DRUG ADDON: CPT

## 2022-01-19 PROCEDURE — 84484 ASSAY OF TROPONIN QUANT: CPT

## 2022-01-19 PROCEDURE — 93010 ELECTROCARDIOGRAM REPORT: CPT | Performed by: INTERNAL MEDICINE

## 2022-01-19 PROCEDURE — 2500000003 HC RX 250 WO HCPCS: Performed by: PHYSICIAN ASSISTANT

## 2022-01-19 RX ORDER — 0.9 % SODIUM CHLORIDE 0.9 %
1000 INTRAVENOUS SOLUTION INTRAVENOUS ONCE
Status: COMPLETED | OUTPATIENT
Start: 2022-01-19 | End: 2022-01-19

## 2022-01-19 RX ORDER — DEXAMETHASONE SODIUM PHOSPHATE 10 MG/ML
6 INJECTION INTRAMUSCULAR; INTRAVENOUS ONCE
Status: COMPLETED | OUTPATIENT
Start: 2022-01-19 | End: 2022-01-19

## 2022-01-19 RX ORDER — ONDANSETRON 2 MG/ML
4 INJECTION INTRAMUSCULAR; INTRAVENOUS ONCE
Status: COMPLETED | OUTPATIENT
Start: 2022-01-19 | End: 2022-01-19

## 2022-01-19 RX ORDER — ALBUTEROL SULFATE 90 UG/1
2 AEROSOL, METERED RESPIRATORY (INHALATION) 4 TIMES DAILY PRN
Qty: 18 G | Refills: 0 | Status: SHIPPED | OUTPATIENT
Start: 2022-01-19 | End: 2022-09-15 | Stop reason: ALTCHOICE

## 2022-01-19 RX ORDER — ONDANSETRON 4 MG/1
4 TABLET, ORALLY DISINTEGRATING ORAL EVERY 8 HOURS PRN
Qty: 20 TABLET | Refills: 0 | Status: SHIPPED | OUTPATIENT
Start: 2022-01-19 | End: 2022-09-15 | Stop reason: ALTCHOICE

## 2022-01-19 RX ADMIN — FAMOTIDINE 20 MG: 10 INJECTION, SOLUTION INTRAVENOUS at 14:39

## 2022-01-19 RX ADMIN — SODIUM CHLORIDE 1000 ML: 9 INJECTION, SOLUTION INTRAVENOUS at 14:35

## 2022-01-19 RX ADMIN — DEXAMETHASONE SODIUM PHOSPHATE 6 MG: 10 INJECTION INTRAMUSCULAR; INTRAVENOUS at 14:39

## 2022-01-19 RX ADMIN — ONDANSETRON 4 MG: 2 INJECTION INTRAMUSCULAR; INTRAVENOUS at 14:37

## 2022-01-19 ASSESSMENT — ENCOUNTER SYMPTOMS
ABDOMINAL PAIN: 0
COLOR CHANGE: 0
CHEST TIGHTNESS: 1
DIARRHEA: 1
SHORTNESS OF BREATH: 1
VOMITING: 0
COUGH: 1
NAUSEA: 1

## 2022-01-19 NOTE — TELEPHONE ENCOUNTER
Received call from April at [facility] with Red Flag Complaint. Subjective: Caller states \"sob has covid\"     Current Symptoms: dx 7 days ago with covid still c/o sob any activity and talking makes her sob and fatigue, dry cough, hx asthma right side of chest hurts    Onset: 10 days    Associated Symptoms: NA    Pain Severity: chest hurts right side and upper back    Temperature: none now    What has been tried: cold meds, advil    LMP: NA Pregnant: No    Recommended disposition: er    Care advice provided, patient verbalizes understanding; denies any other questions or concerns; instructed to call back for any new or worsening symptoms. Patient/caller proceeding to oo Emergency Department     Attention Provider: Thank you for allowing me to participate in the care of your patient. The patient was connected to triage in response to information provided to the ECC/PSC. Please do not respond through this encounter as the response is not directed to a shared pool.           Reason for Disposition   MODERATE difficulty breathing (e.g., speaks in phrases, SOB even at rest, pulse 100-120)    Protocols used: CORONAVIRUS (COVID-19) DIAGNOSED OR SUSPECTED-ADULT-OH

## 2022-01-19 NOTE — ED PROVIDER NOTES
[facility]  ED  EMERGENCY DEPARTMENT ENCOUNTER        Pt Name: Analilia Lee  MRN: [id number]  [place] 1980  Date of evaluation: 1/19/2022  Provider: Ronna Carrasquillo PA-C  PCP: Lavona Severance. Michael Sawyer., DO  ED Attending: No att. providers found      This patient was not seen and evaluated by the attending physician No att. providers found. I have independently evaluated this patient. CHIEF COMPLAINT       Chief Complaint   Patient presents with    Shortness of Breath     pt comes from home for sob has had covid for 13days       HISTORY OF PRESENT ILLNESS   (Location/Symptom, Timing/Onset, Context/Setting, Quality, Duration, Modifying Factors, Severity)  Note limiting factors. Analilia Lee is a 39 y.o. female for evaluation via private vehicle, 12d of SOB, chest pain with deep inspiration, gradual onset of worsening sxms, cough not productive, dry barking cough, fatigue, nasal congestion, nausea, diarrhea. History of asthma, no tx prior to arrival.    Nursing Notes were all reviewed and agreed with or any disagreements were addressed  in the HPI. REVIEW OF SYSTEMS  (2-9 systems for level 4, 10 or more for level 5)     Review of Systems   Constitutional: Positive for chills, fatigue and fever. HENT: Positive for dental problem. Respiratory: Positive for cough, chest tightness and shortness of breath. Cardiovascular: Negative for chest pain. Gastrointestinal: Positive for diarrhea and nausea. Negative for abdominal pain and vomiting. Genitourinary: Negative. Musculoskeletal: Negative. Skin: Negative for color change and rash. Neurological: Negative. Hematological: Negative. Psychiatric/Behavioral: Negative. All other systems reviewed and are negative. Positivesand Pertinent negatives as per HPI. Except as noted above in the ROS, all other systems were reviewed and negative.        PAST MEDICAL HISTORY     Past Medical History:   Diagnosis Date  Abnormal Pap smear of cervix     Alcohol abuse 8/20/2020    Alcohol withdrawal syndrome with complication ([facility] [address]     Anemia     Anxiety 4/3/2020    Asthma 1/12/2011    Breast disorder     lumps removed    Post partum depression     Post partum depression     PTSD (post-traumatic stress disorder)     Renal cyst          SURGICAL HISTORY       Past Surgical History:   Procedure Laterality Date    BREAST SURGERY  1/2012    benign tumor removed left breast    EYE SURGERY Bilateral 2002    lasik    HYSTERECTOMY ABDOMINAL N/A 2/21/2019    DAVINCI ROBOTIC TOTAL LAPAROSCOPIC HYSTERECTOMY WITH BILATERAL SALPINGECTOMY,  WITH REMOVAL OF INTRAUTERINE DEVICE, AND RIGHT OVARIAN CYSTECTOMY                **LATEX SENSITIVE**  CPT CODE - 94582 performed by Denzel Parker DO at [address]  12/13/2013    LITHOTRIPSY  1/2014    TONSILLECTOMY      T&A         CURRENT MEDICATIONS       Discharge Medication List as of 1/19/2022  3:45 PM      CONTINUE these medications which have NOT CHANGED    Details   cefdinir (OMNICEF) 300 MG capsule Take 1 capsule by mouth 2 times daily for 10 days, Disp-20 capsule, R-0Normal      hydrOXYzine (VISTARIL) 25 MG capsule TAKE 1 CAPSULE BY MOUTH TWICE DAILY AS NEEDED FOR ANXIETY, Disp-60 capsule, R-0Normal      venlafaxine (EFFEXOR XR) 37.5 MG extended release capsule Take 1 capsule by mouth daily, Disp-30 capsule, R-5Normal      Multiple Vitamins-Minerals (THERAPEUTIC MULTIVITAMIN-MINERALS) tablet Take 1 tablet by mouth daily, Disp-30 tablet, R-0Normal               ALLERGIES     Latex, Penicillins, Macrobid [nitrofurantoin], Vancomycin, Ciprofloxacin, Other, and Sulfa antibiotics    FAMILY HISTORY       Family History   Problem Relation Age of Onset    High Blood Pressure Mother     High Blood Pressure Maternal Grandmother     Cancer Maternal Grandmother         colon    Heart Disease Maternal Grandfather     No Known Problems Father     No Known Problems Brother     No Known Problems Paternal Grandfather     No Known Problems Paternal Grandmother          SOCIAL HISTORY       Social History     Socioeconomic History    Marital status:      Spouse name: Shae Caballero Number of children: 3    Years of education: 16    Highest education level: Bachelor's degree (e.g., BA, AB, BS)   Occupational History    Occupation: [employer]     Comment: brother is Dr. Precious Fleming     Employer: [employer] Occupation: Digital Marketing Sales     Comment: [employer]    Occupation: working for [employer]     Comment: started there first week of August   Tobacco Use    Smoking status: Never Smoker    Smokeless tobacco: Never Used    Tobacco comment: not applicable   Vaping Use    Vaping Use: Never used   Substance and Sexual Activity    Alcohol use: Yes     Alcohol/week: 5.0 standard drinks     Types: 5 Glasses of wine per week     Comment: mild alcoholic before COVID, entered treatment; always leland    Drug use: No    Sexual activity: Yes     Partners: Male   Other Topics Concern    Not on file   Social History Narrative    Parents, brother spouses        Noemy Grade 39        3 children    26 y/o Antonella carranza, starting at [employer] 12 y/o son Makeda [facility] [place]    8 y.o B[employer]u 5th Grade [employer]         Social Determinants of Health     Financial Resource Strain: Medium Risk    Difficulty of Paying Living Expenses: Somewhat hard   Food Insecurity: No Food Insecurity    Worried About Running Out of Food in the Last Year: Never true    Marielena of Food in the Last Year: Never true   Transportation Needs:     Lack of Transportation (Medical): Not on file    Lack of Transportation (Non-Medical):  Not on file   Physical Activity: Unknown    Days of Exercise per Week: 0 days    Minutes of Exercise per Session: Not asked   Stress:     Feeling of Stress : Not on file   Social Connections:     Frequency of Communication with Friends and Family: Not on file    Frequency of Social Gatherings with Friends and Family: Not on file    Attends Anabaptism Services: Not on file    Active Member of Clubs or Organizations: Not on file    Attends Club or Organization Meetings: Not on file    Marital Status: Not on file   Intimate Partner Violence:     Fear of Current or Ex-Partner: Not on file    Emotionally Abused: Not on file    Physically Abused: Not on file    Sexually Abused: Not on file   Housing Stability:     Unable to Pay for Housing in the Last Year: Not on file    Number of [place] in the Last Year: Not on file    Unstable Housing in the Last Year: Not on file       SCREENINGS     NIH Score       [facility]     Heart Score         PHYSICAL EXAM    (up to 7 for level 4, 8 ormore for level 5)     ED Triage Vitals [01/19/22 1400]   BP Temp Temp Source Pulse Resp SpO2 Height Weight   (!) 159/100 98.6 °F (37 °C) Oral 116 18 99 % -- 140 lb (63.5 kg)       GENERAL APPEARANCE: Awake and alert. Cooperative. No acute distress. HEAD: Normocephalic. Atraumatic. EYES: PERRL. EOM's grossly intact. ENT: Mucous membranes are moist.   NECK: Supple. Normal ROM. CHEST: Equal symmetric chest rise. RRR  LUNGS: Breathing is unlabored. Speaking comfortably in full sentences. LCAB  Abdomen: Nondistended  EXTREMITIES: MAEE. No acute deformities. SKIN: Warm and dry. NEUROLOGICAL: Alert and oriented. Strength is 5/5 in all extremities and sensation is intact.         DIAGNOSTIC RESULTS   LABS:    Labs Reviewed   CBC WITH AUTO DIFFERENTIAL - Abnormal; Notable for the following components:       Result Value    RBC 3.98 (*)     .2 (*)     All other components within normal limits    Narrative:     Performed at:  [facility]) [address],  [place], [address] [place], [place] [address]   Phone [phone]   COMPREHENSIVE METABOLIC PANEL W/ REFLEX TO MG FOR LOW K - Abnormal; Notable for the following components:    Glucose 117 (*)     All other components within normal limits    Narrative:     Performed at:  [place] [address] [employer]   Phone [phone]   TROPONIN    Narrative:     Performed at:  [address] [place] [address] [employer]   Phone [phone] PEPTIDE    Narrative:     Performed at:  [address] [place] [address] [employer]   Phone [phone]   D-DIMER, QUANTITATIVE    Narrative:     Performed at:  [address] [place] [address] [employer]   Phone [phone]   HCG, SERUM, QUALITATIVE    Narrative:     Performed at:  [place] [address] [employer]   Phone [phone]   URINE RT REFLEX TO CULTURE    Narrative:     Performed at:  [address] [place] [address] [employer]   Phone [phone]       All other labs were within normal range or notreturned as of this dictation. EKG: All EKG's are interpreted by the Emergency Department Physician who either signs or Co-signs this chart in the absence of a cardiologist.  Please see their note for interpretation of EKG.       RADIOLOGY:         Interpretation per the Radiologist below, if available at the time of this note:    XR CHEST PORTABLE   Final Result   Unremarkable portable exam           CONSULTS:  None      EMERGENCY DEPARTMENT COURSE and DIFFERENTIAL DIAGNOSIS/MDM:   Vitals:    Vitals:    01/19/22 1400   BP: (!) 159/100   Pulse: 116   Resp: 18   Temp: 98.6 °F (37 °C)   TempSrc: Oral   SpO2: 99%   Weight: 140 lb (63.5 kg)       Patient was given the following medications:  Medications   0.9 % sodium chloride bolus (0 mLs IntraVENous Stopped 1/19/22 1552)   dexamethasone (DECADRON) injection 6 mg (6 mg IntraVENous Given 1/19/22 1439)   ondansetron (ZOFRAN) injection 4 mg (4 mg IntraVENous Given 1/19/22 1437)   famotidine (PEPCID) injection 20 mg (20 mg IntraVENous Given 1/19/22 1439)         Afebrile, stable, patient presents to the ED for evaluation. Is provided with IV fluids in addition to Decadron and Zofran long with Pepcid. On reevaluation patient indicates her symptoms are improved. ED Course as of 01/19/22 2253   Wed Jan 19, 2022   1515 Patient's labs returned revealing no evidence of leukocytosis. Mild hyperglycemia with a glucose of 117 otherwise no electrolyte abnormalities normal kidney and liver function. Negative pregnancy no elevation in BNP or troponin. Chest x-ray shows no acute process [AR]      ED Course User Index  [AR] Lethea Stage, PA-C     Chest x-ray shows no acute process. EKG is sinus tachycardia. Tachycardia resolves with IV fluid administration. No indication for admission at this time patient is discharged in stable condition encouraged close follow-up with PCP. All questions are answered. Indications for return to the ED are discussed. Patient is advised if any new or worsening symptoms arise they should immediately return to the emergency room. Follow-up with primary care in 1-2 days. The patient tolerated their visit well. They were seen and evaluated by the attendingphysician, No att. providers found who agreed with the assessment and plan. The patient and / or the family were informed of the results of any tests, a time was given to answer questions, a plan was proposed and they agreed Yuri Oliveira.     Results for orders placed or performed during the hospital encounter of 01/19/22   CBC Auto Differential   Result Value Ref Range    WBC 5.9 4.0 - 11.0 K/uL    RBC 3.98 (L) 4.00 - 5.20 M/uL    Hemoglobin 13.5 12.0 - 16.0 g/dL    Hematocrit 39.9 36.0 - 48.0 %    .2 (H) 80.0 - 100.0 fL    MCH 33.9 26.0 - 34.0 pg    MCHC 33.8 31.0 - 36.0 g/dL    RDW 13.6 12.4 - 15.4 %    Platelets 206 135 - 450 K/uL    MPV 7.8 5.0 - 10.5 fL    Neutrophils % 56.6 %    Lymphocytes % 33.6 %    Monocytes % 7.1 %    Eosinophils % 2.1 %    Basophils % 0.6 %    Neutrophils Absolute 3.3 1.7 - 7.7 K/uL    Lymphocytes Absolute 2.0 1.0 - 5.1 K/uL    Monocytes Absolute 0.4 0.0 - 1.3 K/uL    Eosinophils Absolute 0.1 0.0 - 0.6 K/uL    Basophils Absolute 0.0 0.0 - 0.2 K/uL   Comprehensive Metabolic Panel w/ Reflex to MG   Result Value Ref Range    Sodium 137 136 - 145 mmol/L    Potassium reflex Magnesium 3.8 3.5 - 5.1 mmol/L    Chloride 100 99 - 110 mmol/L    CO2 26 21 - 32 mmol/L    Anion Gap 11 3 - 16    Glucose 117 (H) 70 - 99 mg/dL    BUN 14 7 - 20 mg/dL    CREATININE 0.8 0.6 - 1.1 mg/dL    GFR Non-African American >60 >60    GFR African American >60 >60    Calcium 9.3 8.3 - 10.6 mg/dL    Total Protein 7.4 6.4 - 8.2 g/dL    Albumin 4.3 3.4 - 5.0 g/dL    Albumin/Globulin Ratio 1.4 1.1 - 2.2    Total Bilirubin <0.2 0.0 - 1.0 mg/dL    Alkaline Phosphatase 68 40 - 129 U/L    ALT 15 10 - 40 U/L    AST 18 15 - 37 U/L   Troponin   Result Value Ref Range    Troponin <0.01 <0.01 ng/mL   Brain Natriuretic Peptide   Result Value Ref Range    Pro-BNP 22 0 - 124 pg/mL   D-Dimer, Quantitative   Result Value Ref Range    D-Dimer, Quant <200 0 - 229 ng/mL DDU   HCG Qualitative, Serum   Result Value Ref Range    hCG Qual Negative Detects HCG level >10 MIU/mL   Urinalysis Reflex to Culture    Specimen: Urine, clean catch   Result Value Ref Range    Color, UA Yellow Straw/Yellow    Clarity, UA Clear Clear    Glucose, Ur Negative Negative mg/dL    Bilirubin Urine Negative Negative    Ketones, Urine Negative Negative mg/dL    Specific Gravity, UA 1.015 1.005 - 1.030    Blood, Urine Negative Negative    pH, UA 8.0 5.0 - 8.0    Protein, UA Negative Negative mg/dL    Urobilinogen, Urine 0.2 <2.0 E.U./dL    Nitrite, Urine Negative Negative    Leukocyte Esterase, Urine Negative Negative    Microscopic Examination Not Indicated     Urine Type NotGiven     Urine Reflex to Culture Not Indicated    EKG 12 Lead   Result Value Ref Range    Ventricular Rate 102 BPM    Atrial Rate 102 BPM    P-R Interval 140 ms    QRS Duration 82 ms    Q-T Interval 338 ms    QTc Calculation (Bazett) 440 ms    P Axis 48 degrees    R Axis -10 degrees    T Axis 17 degrees    Diagnosis       Sinus tachycardiaConfirmed by MADELYN Rodgers MD ([id number]) on 1/19/2022 4:46:17 PM       I estimate there is LOW risk for ACUTE CORONARY SYNDROME, INTRACRANIAL HEMORRHAGE, MALIGNANT DYSRHYTHMIA, MENINGITIS, PNEUMONIA, PULMONARY EMBOLISM, SEPSIS, SUBARACHNOID HEMORRHAGE, SUBDURAL HEMATOMA, STROKE, or URINARY TRACT INFECTION, thus I consider the discharge disposition reasonable. Betty Chan and I have discussed the diagnosis and risks, and we agree with discharging home to follow-up with their primary doctor. We also discussed returning to the Emergency Department immediately if new or worsening symptoms occur. We have discussed the symptoms which are most concerning (e.g., changing or worsening pain, weakness, vomiting, fever) that necessitate immediate return. Final Impression    1. COVID-19    2. History of asthma        Blood pressure (!) 159/100, pulse 116, temperature 98.6 °F (37 °C), temperature source Oral, resp. rate 18, weight 140 lb (63.5 kg), last menstrual period 02/04/2019, SpO2 99 %, not currently breastfeeding. FINAL IMPRESSION      1. COVID-19    2. History of asthma          DISPOSITION/PLAN   DISPOSITION Decision To Discharge 01/19/2022 03:52:56 PM      PATIENT REFERRED TO:  Valentino Nations. Orie Ni., DO  [address] [place] [address]  [phone]      for a recheck in 2-3  days      DISCHARGE MEDICATIONS:  Discharge Medication List as of 1/19/2022  3:45 PM      START taking these medications    Details   albuterol sulfate HFA (VENTOLIN HFA) 108 (90 Base) MCG/ACT inhaler Inhale 2 puffs into the lungs 4 times daily as needed for Wheezing, Disp-18 g, R-0Normal      ondansetron (ZOFRAN ODT) 4 MG disintegrating tablet Take 1 tablet by mouth every 8 hours as needed for Nausea, Disp-20 tablet, R-0Normal             DISCONTINUED MEDICATIONS:  Discharge Medication List as of 1/19/2022  3:45 PM                 Pt was seen during the COVID 19 pandemic. Appropriate PPE worn by ME during patient encounters. Pt seen during a time with constrained hospital bed capacity and other potential inpatient and outpatient resources were constrained due to the viral pandemic.    Please note that portions of this note were completed with a voice recognition program.  Efforts were made to edit the dictations but occasionally words are mis-transcribed.)    Marco Rivera PA-C (electronically signed)        Marco Rivera PA-C  01/19/22 [id number]

## 2022-01-19 NOTE — LETTER
[facility]  [address] [address]  Phone: [phone]  Fax: [phone]               January 19, 2022    Patient: Priyank Ramos   YOB: 1980   Date of Visit: 1/19/2022       To Whom It May Concern:    Priyank Ramos was seen and treated in our emergency department on 1/19/2022. She may return to work on 1/20/22.       Sincerely,       Dre De La Rosa RN         Signature:__________________________________

## 2022-01-20 DIAGNOSIS — F33.2 SEVERE EPISODE OF RECURRENT MAJOR DEPRESSIVE DISORDER, WITHOUT PSYCHOTIC FEATURES (HCC): ICD-10-CM

## 2022-01-20 DIAGNOSIS — F10.982 ALCOHOL-INDUCED INSOMNIA (HCC): ICD-10-CM

## 2022-01-20 DIAGNOSIS — F41.9 ANXIETY: ICD-10-CM

## 2022-01-20 RX ORDER — HYDROXYZINE PAMOATE 25 MG/1
CAPSULE ORAL
Qty: 60 CAPSULE | Refills: 0 | Status: SHIPPED | OUTPATIENT
Start: 2022-01-20 | End: 2022-02-16

## 2022-01-20 NOTE — TELEPHONE ENCOUNTER
Refill request for hydroxyzine medication.      [facility]      Last visit - 10/28/21     Pending visit - none    Last refill -12/27/21      Medication Contract signed -   Last Oarrs ran-         Additional Comments

## 2022-02-16 DIAGNOSIS — F33.2 SEVERE EPISODE OF RECURRENT MAJOR DEPRESSIVE DISORDER, WITHOUT PSYCHOTIC FEATURES (HCC): ICD-10-CM

## 2022-02-16 DIAGNOSIS — F10.982 ALCOHOL-INDUCED INSOMNIA (HCC): ICD-10-CM

## 2022-02-16 DIAGNOSIS — F41.9 ANXIETY: ICD-10-CM

## 2022-02-16 RX ORDER — HYDROXYZINE PAMOATE 25 MG/1
CAPSULE ORAL
Qty: 60 CAPSULE | Refills: 0 | Status: SHIPPED | OUTPATIENT
Start: 2022-02-16 | End: 2022-10-23

## 2022-09-14 NOTE — PROGRESS NOTES
[address] and [facility]                                             [facility], [address] [place], [place] [address], [address] [place] [address]        Phone: [phone]      Name:  Kenia Carrasco  :    1980    Consultants:   Patient Care Team:  Vito Cramer. Myrna Chirinos DO as PCP - General (Family Medicine)  Vito Cramer. Myrna Chirinos DO as PCP - [facility] Empaneled Provider    Chief Complaint:     Kenia Carrasco is a 43 y.o. female  who presents today for an established patient care visit with Personalized Prevention Plan Services as noted below. HPI:     Kenia Carrasco is a 35yo female with PMH of tachycardia, recurrent UTI, moderate dysplasia of cervix (RICA II), Pap smear of cervix with ASC-US, pes anserine bursitis, asthma, PPD, anxiety, menorrhagia, depression,, alcohol abuse, intentional drug overdose who presents to clinic today with concerns of severe depression. Anxiety  Depression  -Patient states her anxiety/depression began at the start of the pandemic () the patient does have a history of postpartum depression following the birth of her son who is 12.  -Patient endorses constant depressed mood and states that she has not been able to find loki in daily activities in about 2 years  Patient endorses excessive worrying, excessive sleep, decreased appetite approximately twice a month when she states her depression is unbearable. Patient states these episodes last approximately 3 days each, during which she is not able to complete activities of daily living such as going to work. -Patient does have a history of previous suicide attempt by intentional drug overdose in .  -Compliant on venlafaxine 37.5 mg daily.  Previously on 75mg, though patient states this made her a \"zombie\" and she is not interested in increasing her dosage.  -Patient states she has tried Prozac in the past without success as it made her \"ears ring.\"  -Patient states that she has also tried Paxil and Zoloft in the past without success as it caused \"double vision. \"  -Patient states she quit taking hydroxyzine 25 mg twice daily as needed for anxiety approximately 1 year ago as it made her too sleepy. -Patient states she has tried therapy using \"headphones on both ears. \"  She has also tried CBT but states that was not helpful. Last went to therapy a couple of years ago. -Patient states she does not have thoughts of killing herself nor an active plan of suicide,  though she does states she often feels as if she would be \"better off dead. \"        Patient Active Problem List   Diagnosis    Post partum depression    Recurrent UTI    Pes anserine bursitis    S/P robot-assisted surgical procedure    Moderate dysplasia of cervix (RICA II)    Anxiety    Asthma    Papanicolaou smear of cervix with atypical squamous cells of undetermined significance (ASC-US)    Menorrhagia    Alcohol abuse    Depression    Severe episode of recurrent major depressive disorder, without psychotic features ([address]    PTSD (post-traumatic stress disorder)    Intentional drug overdose ([address]    Tachycardia         Past Medical History:    Past Medical History:   Diagnosis Date    Abnormal Pap smear of cervix     Alcohol abuse 8/20/2020    Alcohol withdrawal syndrome with complication ([address]     Anemia     Anxiety 4/3/2020    Asthma 1/12/2011    Breast disorder     lumps removed    Post partum depression     Post partum depression     PTSD (post-traumatic stress disorder)     Renal cyst        Past Surgical History:  Past Surgical History:   Procedure Laterality Date    BREAST SURGERY  1/2012    benign tumor removed left breast    EYE SURGERY Bilateral 2002    lasik    HYSTERECTOMY ABDOMINAL N/A 2/21/2019    DAVINCI ROBOTIC TOTAL LAPAROSCOPIC HYSTERECTOMY WITH BILATERAL SALPINGECTOMY,  WITH REMOVAL OF INTRAUTERINE DEVICE, AND RIGHT OVARIAN CYSTECTOMY                **LATEX SENSITIVE**  CPT screen  Discontinued          Immunization History   Administered Date(s) Administered    COVID-19, MODERNA BLUE border, Primary or Immunocompromised, (age 12y+), IM, 100 mcg/0.5mL 01/20/2021, 02/19/2021         Review of Systems:  Review of Systems   Constitutional:  Positive for appetite change (decreased appetite) and fatigue. Respiratory:  Negative for shortness of breath and wheezing. Cardiovascular:  Negative for chest pain and palpitations. Gastrointestinal:  Negative for constipation, diarrhea, nausea and vomiting. Psychiatric/Behavioral:  Positive for decreased concentration, dysphoric mood and suicidal ideas. The patient is nervous/anxious. Physical Exam:   There were no vitals filed for this visit. There is no height or weight on file to calculate BMI. Wt Readings from Last 3 Encounters:   01/19/22 140 lb (63.5 kg)   08/24/21 153 lb (69.4 kg)   06/29/21 146 lb 3.2 oz (66.3 kg)       BP Readings from Last 3 Encounters:   01/19/22 (!) 159/100   08/24/21 122/86   06/29/21 128/86       Physical Exam  Cardiovascular:      Rate and Rhythm: Normal rate and regular rhythm. Heart sounds: Normal heart sounds. No murmur heard. No gallop. Pulmonary:      Effort: Pulmonary effort is normal. No respiratory distress. Breath sounds: Normal breath sounds. No wheezing, rhonchi or rales. Abdominal:      General: Abdomen is flat. Bowel sounds are normal. There is no distension. Palpations: Abdomen is soft. Tenderness: There is no abdominal tenderness. Psychiatric:         Mood and Affect: Mood is depressed. Affect is tearful. Behavior: Behavior is withdrawn. Thought Content: Thought content does not include suicidal plan. Lab Review:   No visits with results within 6 Month(s) from this visit.    Latest known visit with results is:   Admission on 01/19/2022, Discharged on 01/19/2022   Component Date Value    Ventricular Rate 01/19/2022 102     Atrial Rate 01/19/2022 102     P-R Interval 01/19/2022 140     QRS Duration 01/19/2022 82     Q-T Interval 01/19/2022 338     QTc Calculation (Bazett) 01/19/2022 440     P Axis 01/19/2022 48     R Axis 01/19/2022 -10     T [place] 01/19/2022 17     Diagnosis 01/19/2022 Sinus tachycardiaConfirmed by Priscilla Camejo MD, MADELYN ([id number]) on 1/19/2022 4:46:17 PM     WBC 01/19/2022 5.9     RBC 01/19/2022 3.98 (A)    Hemoglobin 01/19/2022 13.5     Hematocrit 01/19/2022 39.9     MCV 01/19/2022 100.2 (A)    MCH 01/19/2022 33.9     MCHC 01/19/2022 33.8     RDW 01/19/2022 13.6     Platelets 76/09/7030 257     MPV 01/19/2022 7.8     Neutrophils % 01/19/2022 56.6     Lymphocytes % 01/19/2022 33.6     Monocytes % 01/19/2022 7.1     Eosinophils % 01/19/2022 2.1     Basophils % 01/19/2022 0.6     Neutrophils Absolute 01/19/2022 3.3     Lymphocytes Absolute 01/19/2022 2.0     Monocytes Absolute 01/19/2022 0.4     Eosinophils Absolute 01/19/2022 0.1     Basophils Absolute 01/19/2022 0.0     Sodium 01/19/2022 137     Potassium reflex Magnesi* 01/19/2022 3.8     Chloride 01/19/2022 100     CO2 01/19/2022 26     Anion Gap 01/19/2022 11     Glucose 01/19/2022 117 (A)    BUN 01/19/2022 14     Creatinine 01/19/2022 0.8     GFR Non- 01/19/2022 >60     GFR  01/19/2022 >60     Calcium 01/19/2022 9.3     Total Protein 01/19/2022 7.4     Albumin 01/19/2022 4.3     Albumin/Globulin Ratio 01/19/2022 1.4     Total Bilirubin 01/19/2022 <0.2     Alkaline Phosphatase 01/19/2022 68     ALT 01/19/2022 15     AST 01/19/2022 18     Troponin 01/19/2022 <0.01     Pro-BNP 01/19/2022 22     D-Dimer, Quant 01/19/2022 <200     hCG Qual 01/19/2022 Negative     Color, UA 01/19/2022 Yellow     Clarity, UA 01/19/2022 Clear     Glucose, Ur 01/19/2022 Negative     Bilirubin Urine 01/19/2022 Negative     Ketones, Urine 01/19/2022 Negative     Specific Gravity, UA 01/19/2022 1.015     Blood, Urine 01/19/2022 Negative     pH, UA 01/19/2022 8.0 Protein, UA 01/19/2022 Negative     Urobilinogen, Urine 01/19/2022 0.2     Nitrite, Urine 01/19/2022 Negative     Leukocyte Esterase, Urine 01/19/2022 Negative     Microscopic Examination 01/19/2022 Not Indicated     Urine Type 01/19/2022 NotGiven     Urine Reflex to Culture 01/19/2022 Not Indicated           Assessment/Plan:  Beto Suresh is a 37yo female with PMH of tachycardia, recurrent UTI, moderate dysplasia of cervix (RICA II), Pap smear of cervix with ASC-US, pes anserine bursitis, asthma, PPD, anxiety, menorrhagia, depression,, alcohol abuse, intentional drug overdose who presents to clinic today with concerns of severe depression. Diagnoses and all orders for this visit:    Anxiety  Severe episode of recurrent major depressive disorder, without psychotic features ([facility]  -Not well controlled, not at goal  -continue venlafaxine (EFFEXOR XR) 37.5 MG extended release capsule; Take 1 capsule by mouth daily  - start buPROPion (WELLBUTRIN SR) 100 MG extended release tablet; Take 1 tablet by mouth 2 times daily. Side effects of this medication and return precautions discussed. Patient verbalized understanding.  -Discussed GeneSight testing and patient expressed interest.  Patient agreed to pay amount of $350 due to having commercial insurance. Swab collected at this visit.  -Patient states she often feels as if she would be \"better off dead,\" though patient denies any plan of suicide.   Patient verbalized agreement to seek emergency medical treatment if increasing thoughts of suicide or plan of suicide arise.  -Follow-up in 2 weeks        Health Maintenance Due:  Health Maintenance Due   Topic Date Due    Pneumococcal 0-64 years Vaccine (1 - PCV) Never done    Hepatitis C screen  Never done    DTaP/Tdap/Td vaccine (1 - Tdap) Never done    COVID-19 Vaccine (3 - Booster for Moderna series) 07/19/2021    Cervical cancer screen  06/21/2022    Flu vaccine (1) Never done          Health care decision maker:  <65 years old      Health Maintenance: (USPSTF Recommendations)  (F) Breast Cancer Screen: (40-49 (C), 50-74 biennial screening mammogram (B))  (F) Cervical Cancer Screen: (21-29 q3yr cytology alone; 30-65 q3yr cytology alone, q5yr with hrHPV alone, or q5yr cytology+hrHPV (A))  (M) Prostate Cancer Screen: (54-77 yo discuss benefits/harm, does not recommend testing PSA in men >73 yo (D):   (M) AAA Screen: (men 73-69 yo who has ever smoked (B), consider in nonsmokers if high risk):  CRC/Colonoscopy Screening: (adults 39-53 (B), 50-75 (A))  Lung Ca Screening: Annual LDCT (+smoker age 49-80, smoked within 15 years, total of 20 pack yr history (B)):  DEXA Screen: (women >65 and older, <65 if at risk/postmenopausal (B))  HIV Screen: (16-65 yr old, and all pregnant patients (A)): Hep C Screen: (18-79 yr old (B)):  HCC Screen: (all pts with cirrhosis and high risk Hep B (US q6 mo)):  Immunizations:    RTC:  Return in about 2 weeks (around 9/29/2022) for med follow up. EMR Dragon/transcription disclaimer:  Much of this encounter note is electronic transcription/translation of spoken language to printed texts. The electronic translation of spoken language may be erroneous, or at times, nonsensical words or phrases may be inadvertently transcribed.   Although I have reviewed the note for such errors, some may still exist.

## 2022-09-15 ENCOUNTER — OFFICE VISIT (OUTPATIENT)
Dept: PRIMARY CARE CLINIC | Age: 42
End: 2022-09-15
Payer: COMMERCIAL

## 2022-09-15 ENCOUNTER — TELEPHONE (OUTPATIENT)
Dept: PRIMARY CARE CLINIC | Age: 42
End: 2022-09-15

## 2022-09-15 VITALS
HEIGHT: 65 IN | DIASTOLIC BLOOD PRESSURE: 86 MMHG | OXYGEN SATURATION: 99 % | HEART RATE: 101 BPM | WEIGHT: 151 LBS | SYSTOLIC BLOOD PRESSURE: 124 MMHG | TEMPERATURE: 97.3 F | BODY MASS INDEX: 25.16 KG/M2

## 2022-09-15 DIAGNOSIS — F33.2 SEVERE EPISODE OF RECURRENT MAJOR DEPRESSIVE DISORDER, WITHOUT PSYCHOTIC FEATURES (HCC): ICD-10-CM

## 2022-09-15 DIAGNOSIS — F41.9 ANXIETY: Primary | ICD-10-CM

## 2022-09-15 PROCEDURE — 1036F TOBACCO NON-USER: CPT

## 2022-09-15 PROCEDURE — G8427 DOCREV CUR MEDS BY ELIG CLIN: HCPCS

## 2022-09-15 PROCEDURE — G8419 CALC BMI OUT NRM PARAM NOF/U: HCPCS

## 2022-09-15 PROCEDURE — 99213 OFFICE O/P EST LOW 20 MIN: CPT

## 2022-09-15 RX ORDER — BUPROPION HYDROCHLORIDE 100 MG/1
100 TABLET, EXTENDED RELEASE ORAL 2 TIMES DAILY
Qty: 60 TABLET | Refills: 3 | Status: SHIPPED | OUTPATIENT
Start: 2022-09-15 | End: 2022-10-07 | Stop reason: SINTOL

## 2022-09-15 RX ORDER — VENLAFAXINE HYDROCHLORIDE 37.5 MG/1
37.5 CAPSULE, EXTENDED RELEASE ORAL DAILY
Qty: 90 CAPSULE | Refills: 1 | Status: SHIPPED | OUTPATIENT
Start: 2022-09-15 | End: 2022-10-07 | Stop reason: SINTOL

## 2022-09-15 ASSESSMENT — COLUMBIA-SUICIDE SEVERITY RATING SCALE - C-SSRS
6. HAVE YOU EVER DONE ANYTHING, STARTED TO DO ANYTHING, OR PREPARED TO DO ANYTHING TO END YOUR LIFE?: NO
1. WITHIN THE PAST MONTH, HAVE YOU WISHED YOU WERE DEAD OR WISHED YOU COULD GO TO SLEEP AND NOT WAKE UP?: YES
2. HAVE YOU ACTUALLY HAD ANY THOUGHTS OF KILLING YOURSELF?: NO

## 2022-09-15 ASSESSMENT — PATIENT HEALTH QUESTIONNAIRE - PHQ9
SUM OF ALL RESPONSES TO PHQ QUESTIONS 1-9: 16
1. LITTLE INTEREST OR PLEASURE IN DOING THINGS: 1
5. POOR APPETITE OR OVEREATING: 2
SUM OF ALL RESPONSES TO PHQ QUESTIONS 1-9: 18
7. TROUBLE CONCENTRATING ON THINGS, SUCH AS READING THE NEWSPAPER OR WATCHING TELEVISION: 3
9. THOUGHTS THAT YOU WOULD BE BETTER OFF DEAD, OR OF HURTING YOURSELF: 2
2. FEELING DOWN, DEPRESSED OR HOPELESS: 2
8. MOVING OR SPEAKING SO SLOWLY THAT OTHER PEOPLE COULD HAVE NOTICED. OR THE OPPOSITE, BEING SO FIGETY OR RESTLESS THAT YOU HAVE BEEN MOVING AROUND A LOT MORE THAN USUAL: 0
SUM OF ALL RESPONSES TO PHQ QUESTIONS 1-9: 18
6. FEELING BAD ABOUT YOURSELF - OR THAT YOU ARE A FAILURE OR HAVE LET YOURSELF OR YOUR FAMILY DOWN: 3
10. IF YOU CHECKED OFF ANY PROBLEMS, HOW DIFFICULT HAVE THESE PROBLEMS MADE IT FOR YOU TO DO YOUR WORK, TAKE CARE OF THINGS AT HOME, OR GET ALONG WITH OTHER PEOPLE: 2
SUM OF ALL RESPONSES TO PHQ9 QUESTIONS 1 & 2: 3
3. TROUBLE FALLING OR STAYING ASLEEP: 3
SUM OF ALL RESPONSES TO PHQ QUESTIONS 1-9: 18
4. FEELING TIRED OR HAVING LITTLE ENERGY: 2

## 2022-09-15 ASSESSMENT — ENCOUNTER SYMPTOMS
DIARRHEA: 0
WHEEZING: 0
SHORTNESS OF BREATH: 0
CONSTIPATION: 0
NAUSEA: 0
VOMITING: 0

## 2022-09-15 ASSESSMENT — ANXIETY QUESTIONNAIRES
3. WORRYING TOO MUCH ABOUT DIFFERENT THINGS: 3
IF YOU CHECKED OFF ANY PROBLEMS ON THIS QUESTIONNAIRE, HOW DIFFICULT HAVE THESE PROBLEMS MADE IT FOR YOU TO DO YOUR WORK, TAKE CARE OF THINGS AT HOME, OR GET ALONG WITH OTHER PEOPLE: VERY DIFFICULT
6. BECOMING EASILY ANNOYED OR IRRITABLE: 2
2. NOT BEING ABLE TO STOP OR CONTROL WORRYING: 3
4. TROUBLE RELAXING: 3
7. FEELING AFRAID AS IF SOMETHING AWFUL MIGHT HAPPEN: 2
5. BEING SO RESTLESS THAT IT IS HARD TO SIT STILL: 2
1. FEELING NERVOUS, ANXIOUS, OR ON EDGE: 3
GAD7 TOTAL SCORE: 18

## 2022-09-15 NOTE — TELEPHONE ENCOUNTER
[employer] Sample collection information entered into system and scheduled for [employer] pickup. Medical Necessity Documentation letter, Consent form, and F[employer] confirmation page scanned into chart.

## 2022-10-04 ASSESSMENT — ENCOUNTER SYMPTOMS
DIARRHEA: 0
VOMITING: 0
SHORTNESS OF BREATH: 0
WHEEZING: 0
CONSTIPATION: 0
NAUSEA: 0

## 2022-10-04 NOTE — PROGRESS NOTES
[address] and [facility]                                             [address] [facility], [address] [place] [address], [address] [place] [address]        Phone: [phone]      Name:  Azam Rascon  :    1980    Consultants:   Patient Care Team:  Bebeto Gomez DO as PCP - General (Family Medicine)    Chief Complaint:     Azam Rascon is a 43 y.o. female  who presents today for an established patient care visit with Personalized Prevention Plan Services as noted below. HPI:     Azam Rascon is a 35yo female with PMH of tachycardia, recurrent UTI, moderate dysplasia of cervix (RICA II), Pap smear of cervix with ASC-US, pes anserine bursitis, asthma, PPD, anxiety, menorrhagia, depression,, alcohol abuse, intentional drug overdose who presents to clinic today with concerns of depression and medication side effects. Depression  Anxiety  -Patient states her anxiety/depression began at the start of the pandemic () the patient does have a history of postpartum depression following the birth of her son who is 12.  -Patient endorses constant depressed mood and states that she has not been able to find loki in daily activities in about 2 years  Patient endorses excessive worrying, excessive sleep, decreased appetite approximately twice a month when she states her depression is unbearable. Patient states these episodes last approximately 3 days each, during which she is not able to complete activities of daily living such as going to work. -Patient does have a history of previous suicide attempt by intentional drug overdose in .  -Compliant on venlafaxine 37.5 mg daily. Previously on 75mg, though patient states this made her a \"zombie\" and she is not interested in increasing her dosage.  -Patient states she has tried Prozac in the past without success as it made her \"ears ring. \"  -Patient states that she has also tried Paxil and Zoloft in the past without success as it caused \"double vision. \"  -Patient states she quit taking hydroxyzine 25 mg twice daily as needed for anxiety approximately 1 year ago as it made her too sleepy. -Patient states she has tried therapy using \"headphones on both ears. \"  She has also tried CBT but states that was not helpful. Last went to therapy a couple of years ago. -Patient states she does not have thoughts of killing herself nor an active plan of suicide,  though she does states she often feels as if she would be \"better off dead. \"  -Bupropion 100mg twice daily was started at previous appt (9/15/22). Patient was swabbed for Genesight on 9/15/22 as well  -Patient reveals Bupropion has caused significant tremors and tachycardia   -Genesight results revealed bupropion, Doxepin, fluoxetine, venlafaxine in the red zone due to increased risk of side effects. Only medication in the green is Desvenlafaxine.       Patient Active Problem List   Diagnosis    Post partum depression    Recurrent UTI    Pes anserine bursitis    S/P robot-assisted surgical procedure    Moderate dysplasia of cervix (RICA II)    Anxiety    Asthma    Papanicolaou smear of cervix with atypical squamous cells of undetermined significance (ASC-US)    Menorrhagia    Alcohol abuse    Depression    Severe episode of recurrent major depressive disorder, without psychotic features ([address]    PTSD (post-traumatic stress disorder)    Intentional drug overdose ([address]    Tachycardia         Past Medical History:    Past Medical History:   Diagnosis Date    Abnormal Pap smear of cervix     Alcohol abuse 8/20/2020    Alcohol withdrawal syndrome with complication ([address]     Anemia     Anxiety 4/3/2020    Asthma 1/12/2011    Breast disorder     lumps removed    Post partum depression     Post partum depression     PTSD (post-traumatic stress disorder)     Renal cyst        Past Surgical History:  Past Surgical History:   Procedure Laterality Date    BREAST SURGERY  1/2012    benign tumor removed left breast    EYE SURGERY Bilateral 2002    lasik    HYSTERECTOMY ABDOMINAL N/A 2/21/2019    DAVINCI ROBOTIC TOTAL LAPAROSCOPIC HYSTERECTOMY WITH BILATERAL SALPINGECTOMY,  WITH REMOVAL OF INTRAUTERINE DEVICE, AND RIGHT OVARIAN CYSTECTOMY                **LATEX SENSITIVE**  CPT CODE - 90090 performed by Sherren Fabian, DO at [place] [id number]  12/13/2013    LITHOTRIPSY  1/2014    TONSILLECTOMY      T&A       Home Meds:  Prior to Visit Medications    Medication Sig Taking?  Authorizing Provider   desvenlafaxine (KHEDEZLA) 50 MG TB24 extended release tablet Take 1 tablet by mouth daily Yes Kelsey Altman DO   Multiple Vitamins-Minerals (THERAPEUTIC MULTIVITAMIN-MINERALS) tablet Take 1 tablet by mouth daily Yes Clelia Carrel, MD   hydrOXYzine (VISTARIL) 25 MG capsule TAKE 1 CAPSULE BY MOUTH TWICE DAILY AS NEEDED FOR ANXIETY  Patient not taking: Reported on 9/15/2022  Pratibha Keller MD       Allergies:    Latex, Penicillins, Bupropion, Macrobid [nitrofurantoin], Vancomycin, Ciprofloxacin, Other, and Sulfa antibiotics    Family History:       Problem Relation Age of Onset    High Blood Pressure Mother     High Blood Pressure Maternal Grandmother     Cancer Maternal Grandmother         colon    Heart Disease Maternal Grandfather     No Known Problems Father     No Known Problems Brother     No Known Problems Paternal Grandfather     No Known Problems Paternal Grandmother          Health Maintenance Completed:  Health Maintenance   Topic Date Due    Hepatitis C screen  Never done    Cervical cancer screen  06/21/2022    DTaP/Tdap/Td vaccine (1 - Tdap) 09/15/2023 (Originally 8/25/1999)    Flu vaccine (1) 09/15/2023 (Originally 8/1/2022)    Pneumococcal 0-64 years Vaccine (1 - PCV) 09/15/2023 (Originally 8/25/1986)    COVID-19 Vaccine (3 - Booster for Birtha Asper series) 09/15/2023 (Originally 7/19/2021)    Depression Monitoring 09/15/2023    Diabetes screen  03/03/2024    Lipids  03/03/2026    Hepatitis A vaccine  Aged Out    Hepatitis B vaccine  Aged Out    Hib vaccine  Aged Out    Meningococcal (ACWY) vaccine  Aged Out    Varicella vaccine  Discontinued    HIV screen  Discontinued          Immunization History   Administered Date(s) Administered    COVID-19, MODERNA BLUE border, Primary or Immunocompromised, (age 12y+), IM, 100 mcg/0.5mL 01/20/2021, 02/19/2021         Review of Systems:  Review of Systems   Constitutional:  Positive for appetite change (decreased appetite) and fatigue. Respiratory:  Negative for shortness of breath and wheezing. Cardiovascular:  Positive for palpitations. Negative for chest pain. Gastrointestinal:  Negative for constipation, diarrhea, nausea and vomiting. Neurological:  Positive for tremors. Psychiatric/Behavioral:  Positive for decreased concentration and dysphoric mood. Negative for suicidal ideas. The patient is nervous/anxious. Physical Exam:   Vitals:    10/07/22 0735   BP: 138/82   Site: Left Upper Arm   Position: Sitting   Cuff Size: Small Adult   Pulse: 100   Temp: 97.2 °F (36.2 °C)   TempSrc: Temporal   SpO2: 97%   Weight: 153 lb (69.4 kg)   Height: 5' 5\" (1.651 m)     Body mass index is 25.46 kg/m². Wt Readings from Last 3 Encounters:   10/07/22 153 lb (69.4 kg)   09/15/22 151 lb (68.5 kg)   01/19/22 140 lb (63.5 kg)       BP Readings from Last 3 Encounters:   10/07/22 138/82   09/15/22 124/86   01/19/22 (!) 159/100       Physical Exam  Constitutional:       Appearance: Normal appearance. HENT:      Head: Normocephalic and atraumatic. Cardiovascular:      Rate and Rhythm: Regular rhythm. Tachycardia present. Heart sounds: Normal heart sounds. No murmur heard. No gallop. Pulmonary:      Effort: Pulmonary effort is normal. No respiratory distress. Breath sounds: Normal breath sounds. No wheezing, rhonchi or rales. Abdominal:      General: Abdomen is flat. Bowel sounds are normal. There is no distension. Palpations: Abdomen is soft. Tenderness: There is no abdominal tenderness. Neurological:      Mental Status: She is alert. Motor: Tremor present. Psychiatric:         Mood and Affect: Mood is anxious. Thought Content: Thought content does not include suicidal plan. Lab Review:   No visits with results within 6 Month(s) from this visit.    Latest known visit with results is:   Admission on 01/19/2022, Discharged on 01/19/2022   Component Date Value    Ventricular Rate 01/19/2022 102     Atrial Rate 01/19/2022 102     P-R Interval 01/19/2022 140     QRS Duration 01/19/2022 82     Q-T Interval 01/19/2022 338     QTc Calculation (Bazett) 01/19/2022 440     P Axis 01/19/2022 48     R Axis 01/19/2022 -10     T [place] 01/19/2022 17     Diagnosis 01/19/2022 Sinus tachycardiaConfirmed by Negin Cox MD MADELYN ([id number]) on 1/19/2022 4:46:17 PM     WBC 01/19/2022 5.9     RBC 01/19/2022 3.98 (A)     Hemoglobin 01/19/2022 13.5     Hematocrit 01/19/2022 39.9     MCV 01/19/2022 100.2 (A)     MCH 01/19/2022 33.9     MCHC 01/19/2022 33.8     RDW 01/19/2022 13.6     Platelets 56/43/2153 257     MPV 01/19/2022 7.8     Neutrophils % 01/19/2022 56.6     Lymphocytes % 01/19/2022 33.6     Monocytes % 01/19/2022 7.1     Eosinophils % 01/19/2022 2.1     Basophils % 01/19/2022 0.6     Neutrophils Absolute 01/19/2022 3.3     Lymphocytes Absolute 01/19/2022 2.0     Monocytes Absolute 01/19/2022 0.4     Eosinophils Absolute 01/19/2022 0.1     Basophils Absolute 01/19/2022 0.0     Sodium 01/19/2022 137     Potassium reflex Magnesi* 01/19/2022 3.8     Chloride 01/19/2022 100     CO2 01/19/2022 26     Anion Gap 01/19/2022 11     Glucose 01/19/2022 117 (A)     BUN 01/19/2022 14     Creatinine 01/19/2022 0.8     GFR Non- 01/19/2022 >60     GFR  01/19/2022 >60     Calcium 01/19/2022 9.3     Total Protein 01/19/2022 7.4     Albumin 01/19/2022 4.3     Albumin/Globulin Ratio 01/19/2022 1.4     Total Bilirubin 01/19/2022 <0.2     Alkaline Phosphatase 01/19/2022 68     ALT 01/19/2022 15     AST 01/19/2022 18     Troponin 01/19/2022 <0.01     Pro-BNP 01/19/2022 22     D-Dimer, Quant 01/19/2022 <200     hCG Qual 01/19/2022 Negative     Color, UA 01/19/2022 Yellow     Clarity, UA 01/19/2022 Clear     Glucose, Ur 01/19/2022 Negative     Bilirubin Urine 01/19/2022 Negative     Ketones, Urine 01/19/2022 Negative     Specific Gravity, UA 01/19/2022 1.015     Blood, Urine 01/19/2022 Negative     pH, UA 01/19/2022 8.0     Protein, UA 01/19/2022 Negative     Urobilinogen, Urine 01/19/2022 0.2     Nitrite, Urine 01/19/2022 Negative     Leukocyte Esterase, Urine 01/19/2022 Negative     Microscopic Examination 01/19/2022 Not Indicated     Urine Type 01/19/2022 NotGiven     Urine Reflex to Culture 01/19/2022 Not Indicated           Assessment/Plan:  Mary Mckeon is a 37yo female with PMH of tachycardia, recurrent UTI, moderate dysplasia of cervix (RICA II), Pap smear of cervix with ASC-US, pes anserine bursitis, asthma, PPD, anxiety, menorrhagia, depression,, alcohol abuse, intentional drug overdose who presents to clinic today with concerns of depression and medication side effects. Diagnoses and all orders for this visit:    Moderate episode of recurrent major depressive disorder ([facility]  Anxiety  -Not well controlled, not at goal  -Discontinue venlafaxine 37.5 mg daily as not effective at current dosage and unable to tolerate higher dosage.  -Discontinue bupropion 100 mg twice daily due to adverse effects  -Start desvenlafaxine 50 mg daily. Side effects of this medication and return precautions discussed. Patient verbalized understanding.  -Patient denies active thoughts or plan of suicide.  Patient verbalized agreement to seek emergency medical treatment if increasing thoughts of suicide or plan of suicide arise.  -Follow up  in 2 weeks    Health Maintenance Due:  Health Maintenance Due   Topic Date Due    Hepatitis C screen  Never done    Cervical cancer screen  06/21/2022          Health care decision maker:  <72years old      Health Maintenance: (USPSTF Recommendations)  (F) Breast Cancer Screen: (40-49 (C), 50-74 biennial screening mammogram (B))  (F) Cervical Cancer Screen: (21-29 q3yr cytology alone; 30-65 q3yr cytology alone, q5yr with hrHPV alone, or q5yr cytology+hrHPV (A))  (M) Prostate Cancer Screen: (54-77 yo discuss benefits/harm, does not recommend testing PSA in men >73 yo (D):   (M) AAA Screen: (men 73-67 yo who has ever smoked (B), consider in nonsmokers if high risk):  CRC/Colonoscopy Screening: (adults 39-53 (B), 50-75 (A))  Lung Ca Screening: Annual LDCT (+smoker age 49-80, smoked within 15 years, total of 20 pack yr history (B)):  DEXA Screen: (women >65 and older, <65 if at risk/postmenopausal (B))  HIV Screen: (16-65 yr old, and all pregnant patients (A)): Hep C Screen: (18-79 yr old (B)):  HCC Screen: (all pts with cirrhosis and high risk Hep B (US q6 mo)):  Immunizations:    RTC:  Return in about 2 weeks (around 10/21/2022) for depression f/u. EMR Dragon/transcription disclaimer:  Much of this encounter note is electronic transcription/translation of spoken language to printed texts. The electronic translation of spoken language may be erroneous, or at times, nonsensical words or phrases may be inadvertently transcribed.   Although I have reviewed the note for such errors, some may still exist.

## 2022-10-07 ENCOUNTER — OFFICE VISIT (OUTPATIENT)
Dept: PRIMARY CARE CLINIC | Age: 42
End: 2022-10-07
Payer: COMMERCIAL

## 2022-10-07 VITALS
SYSTOLIC BLOOD PRESSURE: 138 MMHG | HEART RATE: 100 BPM | DIASTOLIC BLOOD PRESSURE: 82 MMHG | TEMPERATURE: 97.2 F | OXYGEN SATURATION: 97 % | HEIGHT: 65 IN | BODY MASS INDEX: 25.49 KG/M2 | WEIGHT: 153 LBS

## 2022-10-07 DIAGNOSIS — F33.1 MODERATE EPISODE OF RECURRENT MAJOR DEPRESSIVE DISORDER (HCC): Primary | ICD-10-CM

## 2022-10-07 DIAGNOSIS — F41.9 ANXIETY: ICD-10-CM

## 2022-10-07 PROCEDURE — 1036F TOBACCO NON-USER: CPT

## 2022-10-07 PROCEDURE — 99214 OFFICE O/P EST MOD 30 MIN: CPT

## 2022-10-07 PROCEDURE — G8484 FLU IMMUNIZE NO ADMIN: HCPCS

## 2022-10-07 PROCEDURE — G8419 CALC BMI OUT NRM PARAM NOF/U: HCPCS

## 2022-10-07 PROCEDURE — G8427 DOCREV CUR MEDS BY ELIG CLIN: HCPCS

## 2022-10-07 RX ORDER — DESVENLAFAXINE 50 MG/1
50 TABLET, EXTENDED RELEASE ORAL DAILY
Qty: 30 TABLET | Refills: 3 | Status: SHIPPED | OUTPATIENT
Start: 2022-10-07

## 2022-10-07 ASSESSMENT — COLUMBIA-SUICIDE SEVERITY RATING SCALE - C-SSRS
2. HAVE YOU ACTUALLY HAD ANY THOUGHTS OF KILLING YOURSELF?: YES
6. HAVE YOU EVER DONE ANYTHING, STARTED TO DO ANYTHING, OR PREPARED TO DO ANYTHING TO END YOUR LIFE?: NO
1. WITHIN THE PAST MONTH, HAVE YOU WISHED YOU WERE DEAD OR WISHED YOU COULD GO TO SLEEP AND NOT WAKE UP?: NO
3. HAVE YOU BEEN THINKING ABOUT HOW YOU MIGHT KILL YOURSELF?: NO
4. HAVE YOU HAD THESE THOUGHTS AND HAD SOME INTENTION OF ACTING ON THEM?: NO
5. HAVE YOU STARTED TO WORK OUT OR WORKED OUT THE DETAILS OF HOW TO KILL YOURSELF? DO YOU INTEND TO CARRY OUT THIS PLAN?: NO

## 2022-10-07 ASSESSMENT — PATIENT HEALTH QUESTIONNAIRE - PHQ9
3. TROUBLE FALLING OR STAYING ASLEEP: 1
SUM OF ALL RESPONSES TO PHQ QUESTIONS 1-9: 7
9. THOUGHTS THAT YOU WOULD BE BETTER OFF DEAD, OR OF HURTING YOURSELF: 1
SUM OF ALL RESPONSES TO PHQ QUESTIONS 1-9: 8
4. FEELING TIRED OR HAVING LITTLE ENERGY: 1
7. TROUBLE CONCENTRATING ON THINGS, SUCH AS READING THE NEWSPAPER OR WATCHING TELEVISION: 0
SUM OF ALL RESPONSES TO PHQ9 QUESTIONS 1 & 2: 2
5. POOR APPETITE OR OVEREATING: 1
1. LITTLE INTEREST OR PLEASURE IN DOING THINGS: 1
10. IF YOU CHECKED OFF ANY PROBLEMS, HOW DIFFICULT HAVE THESE PROBLEMS MADE IT FOR YOU TO DO YOUR WORK, TAKE CARE OF THINGS AT HOME, OR GET ALONG WITH OTHER PEOPLE: 1
6. FEELING BAD ABOUT YOURSELF - OR THAT YOU ARE A FAILURE OR HAVE LET YOURSELF OR YOUR FAMILY DOWN: 2
SUM OF ALL RESPONSES TO PHQ QUESTIONS 1-9: 8
8. MOVING OR SPEAKING SO SLOWLY THAT OTHER PEOPLE COULD HAVE NOTICED. OR THE OPPOSITE, BEING SO FIGETY OR RESTLESS THAT YOU HAVE BEEN MOVING AROUND A LOT MORE THAN USUAL: 0
2. FEELING DOWN, DEPRESSED OR HOPELESS: 1
SUM OF ALL RESPONSES TO PHQ QUESTIONS 1-9: 8

## 2022-10-07 ASSESSMENT — ANXIETY QUESTIONNAIRES
1. FEELING NERVOUS, ANXIOUS, OR ON EDGE: 2
2. NOT BEING ABLE TO STOP OR CONTROL WORRYING: 2
5. BEING SO RESTLESS THAT IT IS HARD TO SIT STILL: 0
6. BECOMING EASILY ANNOYED OR IRRITABLE: 1
3. WORRYING TOO MUCH ABOUT DIFFERENT THINGS: 2
GAD7 TOTAL SCORE: 10
4. TROUBLE RELAXING: 1
IF YOU CHECKED OFF ANY PROBLEMS ON THIS QUESTIONNAIRE, HOW DIFFICULT HAVE THESE PROBLEMS MADE IT FOR YOU TO DO YOUR WORK, TAKE CARE OF THINGS AT HOME, OR GET ALONG WITH OTHER PEOPLE: SOMEWHAT DIFFICULT
7. FEELING AFRAID AS IF SOMETHING AWFUL MIGHT HAPPEN: 2

## 2022-11-15 ENCOUNTER — TELEPHONE (OUTPATIENT)
Dept: PRIMARY CARE CLINIC | Age: 42
End: 2022-11-15

## 2022-11-15 ENCOUNTER — OFFICE VISIT (OUTPATIENT)
Dept: PRIMARY CARE CLINIC | Age: 42
End: 2022-11-15
Payer: COMMERCIAL

## 2022-11-15 VITALS
WEIGHT: 157.2 LBS | DIASTOLIC BLOOD PRESSURE: 88 MMHG | TEMPERATURE: 97.6 F | BODY MASS INDEX: 26.16 KG/M2 | SYSTOLIC BLOOD PRESSURE: 128 MMHG | RESPIRATION RATE: 16 BRPM | HEART RATE: 117 BPM | OXYGEN SATURATION: 98 %

## 2022-11-15 DIAGNOSIS — I10 PRIMARY HYPERTENSION: ICD-10-CM

## 2022-11-15 DIAGNOSIS — R00.0 TACHYCARDIA: ICD-10-CM

## 2022-11-15 DIAGNOSIS — F33.2 SEVERE EPISODE OF RECURRENT MAJOR DEPRESSIVE DISORDER, WITHOUT PSYCHOTIC FEATURES (HCC): ICD-10-CM

## 2022-11-15 DIAGNOSIS — T78.40XA ALLERGY, INITIAL ENCOUNTER: ICD-10-CM

## 2022-11-15 DIAGNOSIS — Z00.00 ROUTINE ADULT HEALTH MAINTENANCE: Primary | ICD-10-CM

## 2022-11-15 PROCEDURE — 3074F SYST BP LT 130 MM HG: CPT

## 2022-11-15 PROCEDURE — G8484 FLU IMMUNIZE NO ADMIN: HCPCS

## 2022-11-15 PROCEDURE — 99214 OFFICE O/P EST MOD 30 MIN: CPT

## 2022-11-15 PROCEDURE — 3078F DIAST BP <80 MM HG: CPT

## 2022-11-15 PROCEDURE — G8427 DOCREV CUR MEDS BY ELIG CLIN: HCPCS

## 2022-11-15 PROCEDURE — 1036F TOBACCO NON-USER: CPT

## 2022-11-15 PROCEDURE — G8419 CALC BMI OUT NRM PARAM NOF/U: HCPCS

## 2022-11-15 RX ORDER — DESVENLAFAXINE 50 MG/1
50 TABLET, EXTENDED RELEASE ORAL DAILY
Qty: 7 TABLET | Refills: 0 | Status: SHIPPED | OUTPATIENT
Start: 2022-11-15 | End: 2022-11-22

## 2022-11-15 RX ORDER — LEVOCETIRIZINE DIHYDROCHLORIDE 5 MG/1
10 TABLET, FILM COATED ORAL NIGHTLY
Qty: 180 TABLET | Refills: 0 | Status: SHIPPED | OUTPATIENT
Start: 2022-11-15

## 2022-11-15 ASSESSMENT — ENCOUNTER SYMPTOMS
SINUS PAIN: 1
EYE ITCHING: 1
SINUS PRESSURE: 1
CHEST TIGHTNESS: 0
EYE REDNESS: 1

## 2022-11-15 ASSESSMENT — PATIENT HEALTH QUESTIONNAIRE - PHQ9
2. FEELING DOWN, DEPRESSED OR HOPELESS: 0
9. THOUGHTS THAT YOU WOULD BE BETTER OFF DEAD, OR OF HURTING YOURSELF: 0
SUM OF ALL RESPONSES TO PHQ QUESTIONS 1-9: 1
SUM OF ALL RESPONSES TO PHQ9 QUESTIONS 1 & 2: 0
1. LITTLE INTEREST OR PLEASURE IN DOING THINGS: 0
SUM OF ALL RESPONSES TO PHQ QUESTIONS 1-9: 1
SUM OF ALL RESPONSES TO PHQ QUESTIONS 1-9: 1
10. IF YOU CHECKED OFF ANY PROBLEMS, HOW DIFFICULT HAVE THESE PROBLEMS MADE IT FOR YOU TO DO YOUR WORK, TAKE CARE OF THINGS AT HOME, OR GET ALONG WITH OTHER PEOPLE: 0
4. FEELING TIRED OR HAVING LITTLE ENERGY: 0
5. POOR APPETITE OR OVEREATING: 0
SUM OF ALL RESPONSES TO PHQ QUESTIONS 1-9: 1
8. MOVING OR SPEAKING SO SLOWLY THAT OTHER PEOPLE COULD HAVE NOTICED. OR THE OPPOSITE, BEING SO FIGETY OR RESTLESS THAT YOU HAVE BEEN MOVING AROUND A LOT MORE THAN USUAL: 0
7. TROUBLE CONCENTRATING ON THINGS, SUCH AS READING THE NEWSPAPER OR WATCHING TELEVISION: 0
6. FEELING BAD ABOUT YOURSELF - OR THAT YOU ARE A FAILURE OR HAVE LET YOURSELF OR YOUR FAMILY DOWN: 1
3. TROUBLE FALLING OR STAYING ASLEEP: 0

## 2022-11-15 ASSESSMENT — ANXIETY QUESTIONNAIRES
4. TROUBLE RELAXING: 0
IF YOU CHECKED OFF ANY PROBLEMS ON THIS QUESTIONNAIRE, HOW DIFFICULT HAVE THESE PROBLEMS MADE IT FOR YOU TO DO YOUR WORK, TAKE CARE OF THINGS AT HOME, OR GET ALONG WITH OTHER PEOPLE: NOT DIFFICULT AT ALL
5. BEING SO RESTLESS THAT IT IS HARD TO SIT STILL: 0
7. FEELING AFRAID AS IF SOMETHING AWFUL MIGHT HAPPEN: 1
6. BECOMING EASILY ANNOYED OR IRRITABLE: 1
GAD7 TOTAL SCORE: 4
1. FEELING NERVOUS, ANXIOUS, OR ON EDGE: 1
3. WORRYING TOO MUCH ABOUT DIFFERENT THINGS: 0
2. NOT BEING ABLE TO STOP OR CONTROL WORRYING: 1

## 2022-11-15 ASSESSMENT — LIFESTYLE VARIABLES
HOW MANY STANDARD DRINKS CONTAINING ALCOHOL DO YOU HAVE ON A TYPICAL DAY: 1 OR 2
HOW OFTEN DO YOU HAVE A DRINK CONTAINING ALCOHOL: MONTHLY OR LESS

## 2022-11-15 NOTE — TELEPHONE ENCOUNTER
Pharmacy called to clarify the Rx for desvenlafaxine (KHEDEZLA) 50 MG TB24 extended release tablet    They wanted to make sure it was okay to give her the same kind that she has been taking. Per MA  Pharmacy given the okay to give the Pt the same medication she has already been taking.

## 2022-11-15 NOTE — PROGRESS NOTES
[address],  [place]elv[place] Basurto, [address] [facility] [address]        Phone: [phone]    The following is written by a medical student, please see below for resident/attending attestation and plan. Name:  Tenzin Sow  :    1980    Consultants:   Patient Care Team:  Mikki Gregory DO as PCP - General (Family Medicine)    Chief Complaint:     Tenzin Sow is a 43 y.o. female  who presents today for an established patient care visit with Personalized Prevention Plan Services as noted below. HPI:      Tenzin Sow is a 35yo female with PMH of tachycardia, recurrent UTI, asthma, PPD, anxiety, depression, alcohol abuse, intentional drug overdose who presents to clinic today for mood/med check. Depression/anxiety  At last visit patient's venlafaxine and bupropion were stopped and she was started on desvenlafaxine 50 mg daily. She is doing significantly better on this medicine and taking regularly. She is no longer having suicidal thoughts, can get out of bed in the morning. She is sleeping well. After stopping the effexor and buproprion she did notice some tactile hallucinations a couple days after stopping. No worsening depression symptoms or shaking. She has a pill pack and it got lost in her recent move, so she is seven pills short this month. Tachycardia  Her resting HR is usually . This runs in her family and she has never seen a cardiologist. She does have some palpitations with anxiety. Does not drink caffeine regularly. No family history of arrhythmia. Allergies  Patient has history of allergies usually in the spring and fall. For the last four months she has had worsening symptoms with almost constant itchy, watery swollen eyes, sneezing, sinus pain and pressure.  Symptoms are worse on weekends when home. She has to take benadryl on Sunday to sleep. She has also tried loratadine, cetirizine, nasacort, flonase with some relief. Dog in the home. PHQ-9 Total Score: 1 (11/15/2022  1:31 PM)  Thoughts that you would be better off dead, or of hurting yourself in some way: 0 (11/15/2022  1:31 PM)    HEATHER 7 SCORE 11/15/2022 10/7/2022 9/15/2022 10/28/2021 6/29/2021 8/20/2020 4/3/2020   HEATHER-7 Total Score 4 10 18 - - - -   HEATHER-7 Total Score - - - 3 13 8 6   Interpretation of HEATHER-7 score: 5-9 = mild anxiety, 10-14 = moderate anxiety, 15+ = severe anxiety. Recommend referral to behavioral health for scores 10 or greater.       Patient Active Problem List   Diagnosis    Post partum depression    Recurrent UTI    Pes anserine bursitis    S/P robot-assisted surgical procedure    Moderate dysplasia of cervix (RICA II)    Anxiety    Asthma    Papanicolaou smear of cervix with atypical squamous cells of undetermined significance (ASC-US)    Menorrhagia    Alcohol abuse    Depression    Severe episode of recurrent major depressive disorder, without psychotic features ([address]    PTSD (post-traumatic stress disorder)    Intentional drug overdose ([address]    Tachycardia         Past Medical History:    Past Medical History:   Diagnosis Date    Abnormal Pap smear of cervix     Alcohol abuse 8/20/2020    Alcohol withdrawal syndrome with complication ([address]     Anemia     Anxiety 4/3/2020    Asthma 1/12/2011    Breast disorder     lumps removed    Post partum depression     Post partum depression     PTSD (post-traumatic stress disorder)     Renal cyst        Past Surgical History:  Past Surgical History:   Procedure Laterality Date    BREAST SURGERY  1/2012    benign tumor removed left breast    EYE SURGERY Bilateral 2002    lasik    HYSTERECTOMY ABDOMINAL N/A 2/21/2019    DAVINCI ROBOTIC TOTAL LAPAROSCOPIC HYSTERECTOMY WITH BILATERAL SALPINGECTOMY,  WITH REMOVAL OF INTRAUTERINE DEVICE, AND RIGHT OVARIAN CYSTECTOMY                **LATEX SENSITIVE**  CPT CODE - 72126 performed by Evan Mahajan DO at [address]  12/13/2013    LITHOTRIPSY  1/2014    TONSILLECTOMY      T&A       Home Meds:  Prior to Visit Medications    Medication Sig Taking?  Authorizing Provider   levocetirizine (XYZAL) 5 MG tablet Take 2 tablets by mouth nightly Yes Juan Astorgaleticia Luna-Rached, DO   desvenlafaxine (KHEDEZLA) 50 MG TB24 extended release tablet Take 1 tablet by mouth daily for 7 days Replacement of loss pillpack to ensure patient can continue taking medication daily and adequately Yes Juan Chu Luna-Rached, DO   desvenlafaxine (KHEDEZLA) 50 MG TB24 extended release tablet Take 1 tablet by mouth daily Yes Yolande Archibald,    Multiple Vitamins-Minerals (THERAPEUTIC MULTIVITAMIN-MINERALS) tablet Take 1 tablet by mouth daily Yes Elysa Hammans, MD       Allergies:    Latex, Penicillins, Bupropion, Macrobid [nitrofurantoin], Vancomycin, Ciprofloxacin, Other, and Sulfa antibiotics    Family History:       Problem Relation Age of Onset    High Blood Pressure Mother     High Blood Pressure Maternal Grandmother     Cancer Maternal Grandmother         colon    Heart Disease Maternal Grandfather     No Known Problems Father     No Known Problems Brother     No Known Problems Paternal Grandfather     No Known Problems Paternal Grandmother          Health Maintenance Completed:  Health Maintenance   Topic Date Due    DTaP/Tdap/Td vaccine (1 - Tdap) 09/15/2023 (Originally 8/25/1999)    Flu vaccine (1) 09/15/2023 (Originally 8/1/2022)    Pneumococcal 0-64 years Vaccine (1 - PCV) 09/15/2023 (Originally 8/25/1986)    COVID-19 Vaccine (3 - Booster for Orlie Daron series) 09/15/2023 (Originally 4/16/2021)    Hepatitis C screen  09/15/2023 (Originally 8/25/1998)    Depression Monitoring  11/15/2023    Diabetes screen  03/03/2024    Lipids  03/03/2026    Hepatitis A vaccine  Aged Out    Hib vaccine  Aged Out    Meningococcal (ACWY) vaccine  Aged Out Varicella vaccine  Discontinued    HIV screen  Discontinued    Cervical cancer screen  Discontinued          Immunization History   Administered Date(s) Administered    COVID-19, MODERNA BLUE border, Primary or Immunocompromised, (age 12y+), IM, 100 mcg/0.5mL 01/20/2021, 02/19/2021         Review of Systems:  Review of Systems   HENT:  Positive for congestion, sinus pressure, sinus pain and sneezing. Eyes:  Positive for redness and itching. Respiratory:  Negative for chest tightness. Cardiovascular:  Negative for chest pain and palpitations. Musculoskeletal:  Negative for arthralgias and myalgias. Neurological:  Negative for dizziness, light-headedness and numbness. Psychiatric/Behavioral:  Negative for dysphoric mood, sleep disturbance and suicidal ideas. Physical Exam:   Vitals:    11/15/22 1327   BP: 128/88   Site: Left Upper Arm   Position: Sitting   Cuff Size: Medium Adult   Pulse: (!) 117   Resp: 16   Temp: 97.6 °F (36.4 °C)   SpO2: 98%   Weight: 157 lb 3.2 oz (71.3 kg)     Body mass index is 26.16 kg/m². Wt Readings from Last 3 Encounters:   11/15/22 157 lb 3.2 oz (71.3 kg)   11/14/22 155 lb (70.3 kg)   10/07/22 153 lb (69.4 kg)       BP Readings from Last 3 Encounters:   11/15/22 128/88   11/14/22 120/82   10/07/22 138/82       Physical Exam  Constitutional:       Appearance: Normal appearance. HENT:      Head: Normocephalic and atraumatic. Eyes:      Conjunctiva/sclera: Conjunctivae normal.   Cardiovascular:      Rate and Rhythm: Regular rhythm. Tachycardia present. Heart sounds: Normal heart sounds. No murmur heard. No friction rub. No gallop. Pulmonary:      Effort: Pulmonary effort is normal.      Breath sounds: Normal breath sounds. No wheezing, rhonchi or rales. Neurological:      Mental Status: She is alert. Lab Review:   No visits with results within 2 Month(s) from this visit.    Latest known visit with results is:   Admission on 01/19/2022, Discharged on 01/19/2022   Component Date Value    Ventricular Rate 01/19/2022 102     Atrial Rate 01/19/2022 102     P-R Interval 01/19/2022 140     QRS Duration 01/19/2022 82     Q-T Interval 01/19/2022 338     QTc Calculation (Bazett) 01/19/2022 440     P Axis 01/19/2022 48     R Axis 01/19/2022 -10     T [place] 01/19/2022 17     Diagnosis 01/19/2022 Sinus tachycardiaConfirmed by MADELYN Hennessy MD ([id number]) on 1/19/2022 4:46:17 PM     WBC 01/19/2022 5.9     RBC 01/19/2022 3.98 (A)     Hemoglobin 01/19/2022 13.5     Hematocrit 01/19/2022 39.9     MCV 01/19/2022 100.2 (A)     MCH 01/19/2022 33.9     MCHC 01/19/2022 33.8     RDW 01/19/2022 13.6     Platelets 27/89/9689 257     MPV 01/19/2022 7.8     Neutrophils % 01/19/2022 56.6     Lymphocytes % 01/19/2022 33.6     Monocytes % 01/19/2022 7.1     Eosinophils % 01/19/2022 2.1     Basophils % 01/19/2022 0.6     Neutrophils Absolute 01/19/2022 3.3     Lymphocytes Absolute 01/19/2022 2.0     Monocytes Absolute 01/19/2022 0.4     Eosinophils Absolute 01/19/2022 0.1     Basophils Absolute 01/19/2022 0.0     Sodium 01/19/2022 137     Potassium reflex Magnesi* 01/19/2022 3.8     Chloride 01/19/2022 100     CO2 01/19/2022 26     Anion Gap 01/19/2022 11     Glucose 01/19/2022 117 (A)     BUN 01/19/2022 14     Creatinine 01/19/2022 0.8     GFR Non- 01/19/2022 >60     GFR  01/19/2022 >60     Calcium 01/19/2022 9.3     Total Protein 01/19/2022 7.4     Albumin 01/19/2022 4.3     Albumin/Globulin Ratio 01/19/2022 1.4     Total Bilirubin 01/19/2022 <0.2     Alkaline Phosphatase 01/19/2022 68     ALT 01/19/2022 15     AST 01/19/2022 18     Troponin 01/19/2022 <0.01     Pro-BNP 01/19/2022 22     D-Dimer, Quant 01/19/2022 <200     hCG Qual 01/19/2022 Negative     Color, UA 01/19/2022 Yellow     Clarity, UA 01/19/2022 Clear     Glucose, Ur 01/19/2022 Negative     Bilirubin Urine 01/19/2022 Negative     Ketones, Urine 01/19/2022 Negative     Specific Gravity, UA 01/19/2022 1.015     Blood, Urine 01/19/2022 Negative     pH, UA 01/19/2022 8.0     Protein, UA 01/19/2022 Negative     Urobilinogen, Urine 01/19/2022 0.2     Nitrite, Urine 01/19/2022 Negative     Leukocyte Esterase, Urine 01/19/2022 Negative     Microscopic Examination 01/19/2022 Not Indicated     Urine Type 01/19/2022 NotGiven     Urine Reflex to Culture 01/19/2022 Not Indicated           Assessment/Plan:  Anibal Alvarez is a 35yo female with PMH of tachycardia, recurrent UTI, moderate dysplasia of cervix (RICA II), Pap smear of cervix with ASC-US, pes anserine bursitis, asthma, PPD, anxiety, menorrhagia, depression,, alcohol abuse, intentional drug overdose who presents to clinic today with concerns of depression and medication side effects. Diagnoses and all orders for this visit:    Moderate episode of recurrent major depressive disorder (HCC)  Anxiety  - Controlled  - Continue desvenlafaxine 50 mg daily. Side effects of this medication and return precautions discussed. Patient verbalized understanding.  - Patient denies active thoughts or plan of suicide. Patient verbalized agreement to seek emergency medical treatment if increasing thoughts of suicide or plan of suicide arise.  - Follow up in one month    Tachycardia  - uncontrolled, 112 today  - differential includes anemia, thyroid disorder, electrolyte abnormalities, arrhythmias ([place], WPW), structural disease  - EKG 1/19/22 showed sinus tachycardia  - Ordered echo, CBC, CMP, TSH for further evaluation today  - Consider referral to cardiology pending results  - RTC one month    Allergies  - uncontrolled, dog in the home  - trial levocetirizine 10mg and recommend using nasal steroid spray regularly  - if symptoms not improved at follow up, consider allergy referral  - RTC one month    HTN  - patient with three in-office readings >130/85, meeting criteria for hypertension per ANIBAL guidelines.   - We discussed the risks and benefits of starting medication such as an ACE/ARB today vs diet and lifestyle modifications. Patient voiced understanding and prefers to try diet and lifestyle modifications first  - Recommend DASH diet and regular physical activity  - ordered CMP and urine micro today  - RTC one month      Health Maintenance Due:  There are no preventive care reminders to display for this patient. Health Maintenance: (USPSTF Recommendations)  (F) Cervical Cancer Screen: (21-29 q3yr cytology alone; 30-65 q3yr cytology alone, q5yr with hrHPV alone, or q5yr cytology+hrHPV (A)): hysterectomy in 2018  HIV Screen: (15-65 yr old, and all pregnant patients (A)): non-reactive 2010      RTC:  Return in about 1 month (around 12/15/2022). __________________________________________________________________________________    RESIDENT/ATTENDING ATTESTATION:    After medical student evaluation and exam, I independently gathered patients history, independently did a physical, and agree with A/P as written in medical student's note (other than clarified below). Please see below for additional information documented by the resident/attending including the A/P. Assessment/Plan:    Routine adult health maintenance  -     No routine labs on file since March 2021  Thus orders placed for the following:  - CBC with Auto Differential; Future  -     Comprehensive Metabolic Panel; Future  -  Lipid panel; Future  - Hemoglobin a1c;  Future    Severe episode of recurrent major depressive disorder, without psychotic features ([facility] [address]  -     Continue desvenlafaxine (KHEDEZLA) 50 MG TB24 extended release tablet daily  - Ordered an extra 7 days worth of medication, as replacement of loss pillpack to ensure patient can continue taking medication daily and adequately    Primary hypertension  -     Per ANIBAL guidelines, patient has had 3 out of last 4 blood pressure readings with systolic BP >133 or diastolic BP >59, which meets criteria for hypertension  - Will focus on lifestyle modifications first, and discuss pharmacological treatment at next follow-up visit if still hypertensive  Order placed for the following:  -     MICROALBUMIN / CREATININE URINE RATIO; Future    Tachycardia  -     ECHO 2D WO Color Doppler Complete; Future  -     TSH with Reflex; Future  - Cardiology referral will be discussed with patient at follow-up if results of labs or imaging are concerning and merit further workup from sub-specialist    Allergy, initial encounter, unspecified etiology  - Symptoms as described in HPI  -     Start levocetirizine (XYZAL) 5 MG tablet; Take 2 tablets by mouth nightly  - Will follow up and discuss montelukast or allergist referral if symptoms not improved to patient's satisfaction at next visit       Return in about 1 month (around 12/15/2022).     Electronically signed on 11/15/2022   by Dr. Rody Robbins DO  PGY-1, [facility] Medicine Resident   __________________________________________________________________________________

## 2023-01-18 DIAGNOSIS — T78.40XA ALLERGY, INITIAL ENCOUNTER: ICD-10-CM

## 2023-01-18 RX ORDER — LEVOCETIRIZINE DIHYDROCHLORIDE 5 MG/1
10 TABLET, FILM COATED ORAL NIGHTLY
Qty: 180 TABLET | Refills: 0 | OUTPATIENT
Start: 2023-01-18

## 2023-02-02 ENCOUNTER — TELEPHONE (OUTPATIENT)
Dept: PRIMARY CARE CLINIC | Age: 43
End: 2023-02-02

## 2023-02-02 RX ORDER — DESVENLAFAXINE 50 MG/1
50 TABLET, EXTENDED RELEASE ORAL DAILY
Qty: 30 TABLET | Refills: 3 | Status: SHIPPED | OUTPATIENT
Start: 2023-02-02

## 2023-02-02 NOTE — TELEPHONE ENCOUNTER
Patient is calling requesting a refill of   desvenlafaxine (KHEDEZLA) 50 MG TB24 extended release tablet    Last ov  11/15/2023  Next ov 2/16/2023    Patient is almost out of medication was not advised that medication was denied 1/18/2023.

## 2023-02-02 NOTE — TELEPHONE ENCOUNTER
Refill Request       Last Seen: Last Seen Department: 11/15/2022  Last Seen by PCP: 10/7/2022    Last Written: 10/7/22    Next Appointment:   Future Appointments   Date Time Provider Sandra Vazquez   2/16/2023 10:00 AM Sola Sood DO [facility]       Future appointment scheduled      Requested Prescriptions     Pending Prescriptions Disp Refills    desvenlafaxine (KHEDEZLA) 50 MG TB24 extended release tablet 30 tablet 3     Sig: Take 1 tablet by mouth daily

## 2023-02-15 DIAGNOSIS — T78.40XA ALLERGY, INITIAL ENCOUNTER: ICD-10-CM

## 2023-02-15 NOTE — TELEPHONE ENCOUNTER
Refill Request       Last Seen: Last Seen Department: 11/15/2022  Last Seen by PCP: 11/15/2022    Last Written: 11/15/22    Next Appointment:   Future Appointments   Date Time Provider Sandra Vazquez   3/21/2023  9:30 AM Ilene Mcghee DO [facility]       Future appointment scheduled      Requested Prescriptions     Pending Prescriptions Disp Refills    levocetirizine (XYZAL) 5 MG tablet [Pharmacy Med Name: LEVOCETIRIZINE 5MG TABLETS] 180 tablet 0     Sig: TAKE 2 TABLETS BY MOUTH EVERY NIGHT

## 2023-02-16 RX ORDER — LEVOCETIRIZINE DIHYDROCHLORIDE 5 MG/1
TABLET, FILM COATED ORAL
Qty: 180 TABLET | Refills: 0 | Status: SHIPPED | OUTPATIENT
Start: 2023-02-16

## 2023-03-05 ENCOUNTER — HOSPITAL ENCOUNTER (INPATIENT)
Age: 43
LOS: 3 days | Discharge: HOME | DRG: 700 | End: 2023-03-08
Attending: EMERGENCY MEDICINE | Admitting: INTERNAL MEDICINE
Payer: COMMERCIAL

## 2023-03-05 ENCOUNTER — APPOINTMENT (OUTPATIENT)
Dept: CT IMAGING | Age: 43
DRG: 700 | End: 2023-03-05
Payer: COMMERCIAL

## 2023-03-05 DIAGNOSIS — R00.0 TACHYCARDIA: ICD-10-CM

## 2023-03-05 DIAGNOSIS — N28.1 RENAL CYST: ICD-10-CM

## 2023-03-05 DIAGNOSIS — E27.8 ADRENAL GLAND CYST (HCC): ICD-10-CM

## 2023-03-05 DIAGNOSIS — R10.10 PAIN OF UPPER ABDOMEN: Primary | ICD-10-CM

## 2023-03-05 LAB
A/G RATIO: 1.5 (ref 1.1–2.2)
ALBUMIN SERPL-MCNC: 4.7 G/DL (ref 3.4–5)
ALP BLD-CCNC: 93 U/L (ref 40–129)
ALT SERPL-CCNC: 11 U/L (ref 10–40)
ANION GAP SERPL CALCULATED.3IONS-SCNC: 11 MMOL/L (ref 3–16)
AST SERPL-CCNC: 12 U/L (ref 15–37)
BASOPHILS ABSOLUTE: 0.1 K/UL (ref 0–0.2)
BASOPHILS RELATIVE PERCENT: 0.6 %
BILIRUB SERPL-MCNC: 0.4 MG/DL (ref 0–1)
BILIRUBIN URINE: NEGATIVE
BLOOD, URINE: NEGATIVE
BUN BLDV-MCNC: 8 MG/DL (ref 7–20)
CALCIUM SERPL-MCNC: 9.5 MG/DL (ref 8.3–10.6)
CHLORIDE BLD-SCNC: 96 MMOL/L (ref 99–110)
CLARITY: ABNORMAL
CO2: 25 MMOL/L (ref 21–32)
COLOR: YELLOW
CREAT SERPL-MCNC: 0.6 MG/DL (ref 0.6–1.1)
EOSINOPHILS ABSOLUTE: 0.1 K/UL (ref 0–0.6)
EOSINOPHILS RELATIVE PERCENT: 0.9 %
ETHANOL: NORMAL MG/DL (ref 0–0.08)
GFR SERPL CREATININE-BSD FRML MDRD: >60 ML/MIN/{1.73_M2}
GLUCOSE BLD-MCNC: 111 MG/DL (ref 70–99)
GLUCOSE URINE: NEGATIVE MG/DL
HCT VFR BLD CALC: 40.5 % (ref 36–48)
HEMOGLOBIN: 13.8 G/DL (ref 12–16)
KETONES, URINE: NEGATIVE MG/DL
LACTIC ACID: 1.4 MMOL/L (ref 0.4–2)
LEUKOCYTE ESTERASE, URINE: NEGATIVE
LIPASE: 20 U/L (ref 13–60)
LYMPHOCYTES ABSOLUTE: 2 K/UL (ref 1–5.1)
LYMPHOCYTES RELATIVE PERCENT: 24 %
MCH RBC QN AUTO: 34.5 PG (ref 26–34)
MCHC RBC AUTO-ENTMCNC: 34 G/DL (ref 31–36)
MCV RBC AUTO: 101.4 FL (ref 80–100)
MICROSCOPIC EXAMINATION: ABNORMAL
MONOCYTES ABSOLUTE: 0.7 K/UL (ref 0–1.3)
MONOCYTES RELATIVE PERCENT: 8.1 %
NEUTROPHILS ABSOLUTE: 5.7 K/UL (ref 1.7–7.7)
NEUTROPHILS RELATIVE PERCENT: 66.4 %
NITRITE, URINE: NEGATIVE
PDW BLD-RTO: 13.6 % (ref 12.4–15.4)
PH UA: 8 (ref 5–8)
PLATELET # BLD: 240 K/UL (ref 135–450)
PMV BLD AUTO: 7.9 FL (ref 5–10.5)
POTASSIUM REFLEX MAGNESIUM: 4 MMOL/L (ref 3.5–5.1)
PROTEIN UA: NEGATIVE MG/DL
RBC # BLD: 3.99 M/UL (ref 4–5.2)
SODIUM BLD-SCNC: 132 MMOL/L (ref 136–145)
SPECIFIC GRAVITY UA: 1.01 (ref 1–1.03)
TOTAL PROTEIN: 7.8 G/DL (ref 6.4–8.2)
URINE REFLEX TO CULTURE: ABNORMAL
URINE TYPE: ABNORMAL
UROBILINOGEN, URINE: 0.2 E.U./DL
WBC # BLD: 8.5 K/UL (ref 4–11)

## 2023-03-05 PROCEDURE — 82077 ASSAY SPEC XCP UR&BREATH IA: CPT

## 2023-03-05 PROCEDURE — 6360000002 HC RX W HCPCS: Performed by: PHYSICIAN ASSISTANT

## 2023-03-05 PROCEDURE — 96374 THER/PROPH/DIAG INJ IV PUSH: CPT

## 2023-03-05 PROCEDURE — 96361 HYDRATE IV INFUSION ADD-ON: CPT

## 2023-03-05 PROCEDURE — 6370000000 HC RX 637 (ALT 250 FOR IP): Performed by: PHYSICIAN ASSISTANT

## 2023-03-05 PROCEDURE — 99285 EMERGENCY DEPT VISIT HI MDM: CPT

## 2023-03-05 PROCEDURE — 74177 CT ABD & PELVIS W/CONTRAST: CPT

## 2023-03-05 PROCEDURE — 2580000003 HC RX 258: Performed by: PHYSICIAN ASSISTANT

## 2023-03-05 PROCEDURE — 36415 COLL VENOUS BLD VENIPUNCTURE: CPT

## 2023-03-05 PROCEDURE — 80053 COMPREHEN METABOLIC PANEL: CPT

## 2023-03-05 PROCEDURE — 1200000000 HC SEMI PRIVATE

## 2023-03-05 PROCEDURE — HZ2ZZZZ DETOXIFICATION SERVICES FOR SUBSTANCE ABUSE TREATMENT: ICD-10-PCS | Performed by: INTERNAL MEDICINE

## 2023-03-05 PROCEDURE — 96375 TX/PRO/DX INJ NEW DRUG ADDON: CPT

## 2023-03-05 PROCEDURE — 83605 ASSAY OF LACTIC ACID: CPT

## 2023-03-05 PROCEDURE — 6360000002 HC RX W HCPCS: Performed by: EMERGENCY MEDICINE

## 2023-03-05 PROCEDURE — 83690 ASSAY OF LIPASE: CPT

## 2023-03-05 PROCEDURE — 85025 COMPLETE CBC W/AUTO DIFF WBC: CPT

## 2023-03-05 PROCEDURE — 2580000003 HC RX 258: Performed by: EMERGENCY MEDICINE

## 2023-03-05 PROCEDURE — 81003 URINALYSIS AUTO W/O SCOPE: CPT

## 2023-03-05 PROCEDURE — 6360000002 HC RX W HCPCS

## 2023-03-05 PROCEDURE — 6360000004 HC RX CONTRAST MEDICATION: Performed by: EMERGENCY MEDICINE

## 2023-03-05 PROCEDURE — 2500000003 HC RX 250 WO HCPCS: Performed by: PHYSICIAN ASSISTANT

## 2023-03-05 PROCEDURE — C9113 INJ PANTOPRAZOLE SODIUM, VIA: HCPCS | Performed by: EMERGENCY MEDICINE

## 2023-03-05 RX ORDER — SODIUM CHLORIDE 9 MG/ML
INJECTION, SOLUTION INTRAVENOUS PRN
Status: DISCONTINUED | OUTPATIENT
Start: 2023-03-05 | End: 2023-03-08 | Stop reason: HOSPADM

## 2023-03-05 RX ORDER — LORAZEPAM 2 MG/ML
3 INJECTION INTRAMUSCULAR
Status: DISCONTINUED | OUTPATIENT
Start: 2023-03-05 | End: 2023-03-08 | Stop reason: HOSPADM

## 2023-03-05 RX ORDER — ONDANSETRON 2 MG/ML
4 INJECTION INTRAMUSCULAR; INTRAVENOUS EVERY 6 HOURS PRN
Status: DISCONTINUED | OUTPATIENT
Start: 2023-03-05 | End: 2023-03-08 | Stop reason: HOSPADM

## 2023-03-05 RX ORDER — LORAZEPAM 1 MG/1
1 TABLET ORAL
Status: DISCONTINUED | OUTPATIENT
Start: 2023-03-05 | End: 2023-03-08 | Stop reason: HOSPADM

## 2023-03-05 RX ORDER — OXYCODONE HYDROCHLORIDE 5 MG/1
10 TABLET ORAL EVERY 4 HOURS PRN
Status: DISCONTINUED | OUTPATIENT
Start: 2023-03-05 | End: 2023-03-08 | Stop reason: HOSPADM

## 2023-03-05 RX ORDER — PANTOPRAZOLE SODIUM 40 MG/10ML
40 INJECTION, POWDER, LYOPHILIZED, FOR SOLUTION INTRAVENOUS ONCE
Status: COMPLETED | OUTPATIENT
Start: 2023-03-05 | End: 2023-03-05

## 2023-03-05 RX ORDER — SODIUM CHLORIDE 0.9 % (FLUSH) 0.9 %
5-40 SYRINGE (ML) INJECTION PRN
Status: DISCONTINUED | OUTPATIENT
Start: 2023-03-05 | End: 2023-03-08 | Stop reason: HOSPADM

## 2023-03-05 RX ORDER — SODIUM CHLORIDE, SODIUM LACTATE, POTASSIUM CHLORIDE, AND CALCIUM CHLORIDE .6; .31; .03; .02 G/100ML; G/100ML; G/100ML; G/100ML
1000 INJECTION, SOLUTION INTRAVENOUS ONCE
Status: COMPLETED | OUTPATIENT
Start: 2023-03-05 | End: 2023-03-05

## 2023-03-05 RX ORDER — LORAZEPAM 2 MG/ML
4 INJECTION INTRAMUSCULAR
Status: DISCONTINUED | OUTPATIENT
Start: 2023-03-05 | End: 2023-03-08 | Stop reason: HOSPADM

## 2023-03-05 RX ORDER — ACETAMINOPHEN 325 MG/1
650 TABLET ORAL EVERY 6 HOURS PRN
Status: DISCONTINUED | OUTPATIENT
Start: 2023-03-05 | End: 2023-03-08 | Stop reason: HOSPADM

## 2023-03-05 RX ORDER — CETIRIZINE HYDROCHLORIDE 10 MG/1
5 TABLET ORAL DAILY
Status: DISCONTINUED | OUTPATIENT
Start: 2023-03-05 | End: 2023-03-05

## 2023-03-05 RX ORDER — SODIUM CHLORIDE 0.9 % (FLUSH) 0.9 %
5-40 SYRINGE (ML) INJECTION EVERY 12 HOURS SCHEDULED
Status: DISCONTINUED | OUTPATIENT
Start: 2023-03-05 | End: 2023-03-08 | Stop reason: HOSPADM

## 2023-03-05 RX ORDER — ONDANSETRON 4 MG/1
4 TABLET, ORALLY DISINTEGRATING ORAL EVERY 8 HOURS PRN
Status: DISCONTINUED | OUTPATIENT
Start: 2023-03-05 | End: 2023-03-08 | Stop reason: HOSPADM

## 2023-03-05 RX ORDER — ACETAMINOPHEN 650 MG/1
650 SUPPOSITORY RECTAL EVERY 6 HOURS PRN
Status: DISCONTINUED | OUTPATIENT
Start: 2023-03-05 | End: 2023-03-08 | Stop reason: HOSPADM

## 2023-03-05 RX ORDER — POLYETHYLENE GLYCOL 3350 17 G/17G
17 POWDER, FOR SOLUTION ORAL DAILY PRN
Status: DISCONTINUED | OUTPATIENT
Start: 2023-03-05 | End: 2023-03-08 | Stop reason: HOSPADM

## 2023-03-05 RX ORDER — LORAZEPAM 1 MG/1
4 TABLET ORAL
Status: DISCONTINUED | OUTPATIENT
Start: 2023-03-05 | End: 2023-03-08 | Stop reason: HOSPADM

## 2023-03-05 RX ORDER — LORAZEPAM 1 MG/1
3 TABLET ORAL
Status: DISCONTINUED | OUTPATIENT
Start: 2023-03-05 | End: 2023-03-08 | Stop reason: HOSPADM

## 2023-03-05 RX ORDER — LORAZEPAM 2 MG/ML
1 INJECTION INTRAMUSCULAR
Status: DISCONTINUED | OUTPATIENT
Start: 2023-03-05 | End: 2023-03-08 | Stop reason: HOSPADM

## 2023-03-05 RX ORDER — MORPHINE SULFATE 4 MG/ML
4 INJECTION, SOLUTION INTRAMUSCULAR; INTRAVENOUS ONCE
Status: COMPLETED | OUTPATIENT
Start: 2023-03-05 | End: 2023-03-05

## 2023-03-05 RX ORDER — ONDANSETRON 2 MG/ML
4 INJECTION INTRAMUSCULAR; INTRAVENOUS ONCE
Status: COMPLETED | OUTPATIENT
Start: 2023-03-05 | End: 2023-03-05

## 2023-03-05 RX ORDER — LORAZEPAM 1 MG/1
2 TABLET ORAL
Status: DISCONTINUED | OUTPATIENT
Start: 2023-03-05 | End: 2023-03-08 | Stop reason: HOSPADM

## 2023-03-05 RX ORDER — LORAZEPAM 2 MG/ML
2 INJECTION INTRAMUSCULAR
Status: DISCONTINUED | OUTPATIENT
Start: 2023-03-05 | End: 2023-03-08 | Stop reason: HOSPADM

## 2023-03-05 RX ORDER — DESVENLAFAXINE 50 MG/1
50 TABLET, EXTENDED RELEASE ORAL DAILY
Status: DISCONTINUED | OUTPATIENT
Start: 2023-03-05 | End: 2023-03-08 | Stop reason: HOSPADM

## 2023-03-05 RX ORDER — OXYCODONE HYDROCHLORIDE 5 MG/1
5 TABLET ORAL EVERY 4 HOURS PRN
Status: DISCONTINUED | OUTPATIENT
Start: 2023-03-05 | End: 2023-03-08 | Stop reason: HOSPADM

## 2023-03-05 RX ORDER — ENOXAPARIN SODIUM 100 MG/ML
40 INJECTION SUBCUTANEOUS DAILY
Status: DISCONTINUED | OUTPATIENT
Start: 2023-03-05 | End: 2023-03-08 | Stop reason: HOSPADM

## 2023-03-05 RX ORDER — LABETALOL HYDROCHLORIDE 5 MG/ML
10 INJECTION, SOLUTION INTRAVENOUS EVERY 6 HOURS PRN
Status: DISCONTINUED | OUTPATIENT
Start: 2023-03-05 | End: 2023-03-08 | Stop reason: HOSPADM

## 2023-03-05 RX ORDER — LEVOCETIRIZINE DIHYDROCHLORIDE 5 MG/1
10 TABLET, FILM COATED ORAL NIGHTLY
Status: DISCONTINUED | OUTPATIENT
Start: 2023-03-05 | End: 2023-03-08 | Stop reason: HOSPADM

## 2023-03-05 RX ORDER — LANOLIN ALCOHOL/MO/W.PET/CERES
100 CREAM (GRAM) TOPICAL DAILY
Status: DISCONTINUED | OUTPATIENT
Start: 2023-03-05 | End: 2023-03-08 | Stop reason: HOSPADM

## 2023-03-05 RX ADMIN — OXYCODONE HYDROCHLORIDE 5 MG: 5 TABLET ORAL at 14:28

## 2023-03-05 RX ADMIN — CEFTRIAXONE SODIUM 1000 MG: 1 INJECTION, POWDER, FOR SOLUTION INTRAMUSCULAR; INTRAVENOUS at 10:59

## 2023-03-05 RX ADMIN — LABETALOL HYDROCHLORIDE 10 MG: 5 INJECTION INTRAVENOUS at 17:42

## 2023-03-05 RX ADMIN — PANTOPRAZOLE SODIUM 40 MG: 40 INJECTION, POWDER, FOR SOLUTION INTRAVENOUS at 07:17

## 2023-03-05 RX ADMIN — SODIUM CHLORIDE, POTASSIUM CHLORIDE, SODIUM LACTATE AND CALCIUM CHLORIDE 1000 ML: 600; 310; 30; 20 INJECTION, SOLUTION INTRAVENOUS at 07:14

## 2023-03-05 RX ADMIN — OXYCODONE HYDROCHLORIDE 10 MG: 5 TABLET ORAL at 20:14

## 2023-03-05 RX ADMIN — Medication 0.5 MG: at 09:58

## 2023-03-05 RX ADMIN — Medication 100 MG: at 14:21

## 2023-03-05 RX ADMIN — SODIUM CHLORIDE, POTASSIUM CHLORIDE, SODIUM LACTATE AND CALCIUM CHLORIDE 1000 ML: 600; 310; 30; 20 INJECTION, SOLUTION INTRAVENOUS at 09:17

## 2023-03-05 RX ADMIN — MORPHINE SULFATE 4 MG: 4 INJECTION, SOLUTION INTRAMUSCULAR; INTRAVENOUS at 07:17

## 2023-03-05 RX ADMIN — HYDROMORPHONE HYDROCHLORIDE 0.5 MG: 0.5 INJECTION, SOLUTION INTRAMUSCULAR; INTRAVENOUS; SUBCUTANEOUS at 09:58

## 2023-03-05 RX ADMIN — ONDANSETRON 4 MG: 2 INJECTION INTRAMUSCULAR; INTRAVENOUS at 07:17

## 2023-03-05 RX ADMIN — DESVENLAFAXINE 50 MG: 50 TABLET, EXTENDED RELEASE ORAL at 13:44

## 2023-03-05 RX ADMIN — LEVOCETIRIZINE DIHYDROCHLORIDE 10 MG: 5 TABLET, FILM COATED ORAL at 20:14

## 2023-03-05 RX ADMIN — IOPAMIDOL 75 ML: 755 INJECTION, SOLUTION INTRAVENOUS at 07:48

## 2023-03-05 RX ADMIN — SODIUM CHLORIDE, PRESERVATIVE FREE 10 ML: 5 INJECTION INTRAVENOUS at 20:39

## 2023-03-05 RX ADMIN — ENOXAPARIN SODIUM 40 MG: 100 INJECTION SUBCUTANEOUS at 14:21

## 2023-03-05 ASSESSMENT — PAIN DESCRIPTION - DESCRIPTORS
DESCRIPTORS: ACHING;THROBBING
DESCRIPTORS: ACHING;TENDER;THROBBING
DESCRIPTORS: DULL

## 2023-03-05 ASSESSMENT — PAIN SCALES - GENERAL
PAINLEVEL_OUTOF10: 3
PAINLEVEL_OUTOF10: 6
PAINLEVEL_OUTOF10: 7
PAINLEVEL_OUTOF10: 7
PAINLEVEL_OUTOF10: 5
PAINLEVEL_OUTOF10: 1
PAINLEVEL_OUTOF10: 6
PAINLEVEL_OUTOF10: 2
PAINLEVEL_OUTOF10: 2

## 2023-03-05 ASSESSMENT — PAIN - FUNCTIONAL ASSESSMENT
PAIN_FUNCTIONAL_ASSESSMENT: PREVENTS OR INTERFERES SOME ACTIVE ACTIVITIES AND ADLS
PAIN_FUNCTIONAL_ASSESSMENT: 0-10
PAIN_FUNCTIONAL_ASSESSMENT: ACTIVITIES ARE NOT PREVENTED
PAIN_FUNCTIONAL_ASSESSMENT: ACTIVITIES ARE NOT PREVENTED

## 2023-03-05 ASSESSMENT — PAIN DESCRIPTION - PAIN TYPE
TYPE: ACUTE PAIN
TYPE: ACUTE PAIN

## 2023-03-05 ASSESSMENT — PAIN DESCRIPTION - ORIENTATION
ORIENTATION: UPPER;LEFT
ORIENTATION: LEFT;UPPER
ORIENTATION: UPPER

## 2023-03-05 ASSESSMENT — PAIN DESCRIPTION - LOCATION
LOCATION: ABDOMEN

## 2023-03-05 ASSESSMENT — PAIN DESCRIPTION - FREQUENCY
FREQUENCY: CONTINUOUS
FREQUENCY: CONTINUOUS

## 2023-03-05 NOTE — PROGRESS NOTES
Admitted to 326. , Rickey Pepe, at bedside. A/O x4. VSS. NPO. Patient reports she drinks one bottle of red wine 5 days a week. Last drink was 3/4/23 @7pm. Has been drinking \"since covid\", 2020. Reports last time she quit drinking which was last May and wasn't hospitalized but she did have shaking, trouble sleeping, and anxiety. Current CIWA score is 1. Pain is 2/10 LUQ abdomen. Verified home medications: patient takes desvenlafaxine daily and levocetirizine every night. Patient has medications in a mediset.  went home to get the medication bottles so they can be sent to pharmacy for labeling. Patient reports she hasn't taken her medication today because she has to take pills with food or she will vomit. Message sent to Melly Richard. New order noted for Regular diet and prn oxycodone.

## 2023-03-05 NOTE — H&P
Hospital Medicine History & Physical      PCP: Alem Buenrostro DO    Date of Admission: 3/5/2023    Date of Service: Pt seen/examined on 3/5/2023 and Admitted to Inpatient with expected LOS greater than two midnights due to medical therapy. Chief Complaint:    Abdominal pain      History Of Present Illness:    43 y.o. female history of anxiety with depression and alcohol abuse who presented to [facility] with complaints of abdominal pain. She states her pain started Friday evening under her left ribs. She describes the pain as a dull ache and intermittent at the time. Her pain progressively worsened yesterday and became constant. She rates as 8 out of 10 and states it radiates to her back. Endorses nausea as well. History of known renal cyst, CT abdomen/pelvis in ED demonstrated laboratory versus infectious changes related to cyst.  Started on IV antibiotics and urology consulted. Denies fevers, chills, chest pain, shortness of breath, urinary symptoms, or focal neurologic deficit. Will be admitted for further work-up and evaluation.     Past Medical History:          Diagnosis Date    Abnormal Pap smear of cervix     Alcohol abuse 8/20/2020    Alcohol withdrawal syndrome with complication ([facility] [address]     Anemia     Anxiety 4/3/2020    Asthma 1/12/2011    Breast disorder     lumps removed    Post partum depression     Post partum depression     PTSD (post-traumatic stress disorder)     Renal cyst        Past Surgical History:          Procedure Laterality Date    BREAST SURGERY  1/2012    benign tumor removed left breast    EYE SURGERY Bilateral 2002    lasik    HYSTERECTOMY ABDOMINAL N/A 2/21/2019    DAVINCI ROBOTIC TOTAL LAPAROSCOPIC HYSTERECTOMY WITH BILATERAL SALPINGECTOMY,  WITH REMOVAL OF INTRAUTERINE DEVICE, AND RIGHT OVARIAN CYSTECTOMY                **LATEX SENSITIVE**  CPT CODE - 32423 performed by María Francis DO at [facility]  12/13/2013 LITHOTRIPSY  1/2014    TONSILLECTOMY      T&A       Medications Prior to Admission:      Prior to Admission medications    Medication Sig Start Date End Date Taking? Authorizing Provider   levocetirizine (XYZAL) 5 MG tablet TAKE 2 TABLETS BY MOUTH EVERY NIGHT 2/16/23   Viviana Zully, DO   desvenlafaxine [facility]) 50 MG TB24 extended release tablet Take 1 tablet by mouth daily 2/2/23   Viviana Zully, DO   Multiple Vitamins-Minerals (THERAPEUTIC MULTIVITAMIN-MINERALS) tablet Take 1 tablet by mouth daily  Patient not taking: Reported on 3/5/2023 3/5/21   Sundeep Ortiz MD       Allergies:  Latex, Penicillins, Bupropion, Macrobid [nitrofurantoin], Vancomycin, Ciprofloxacin, Other, and Sulfa antibiotics    Social History:      The patient currently lives home    TOBACCO:   reports that she has never smoked. She has never used smokeless tobacco.  ETOH:   reports current alcohol use of about 5.0 standard drinks per week. E-cigarette/Vaping       Questions Responses    E-cigarette/Vaping Use Never User    Start Date     Passive Exposure     Quit Date     Counseling Given     Comments               Family History:      Reviewed and negative in regards to presenting illness/complaint. Problem Relation Age of Onset    High Blood Pressure Mother     High Blood Pressure Maternal Grandmother     Cancer Maternal Grandmother         colon    Heart Disease Maternal Grandfather     No Known Problems Father     No Known Problems Brother     No Known Problems Paternal Grandfather     No Known Problems Paternal Grandmother        REVIEW OF SYSTEMS COMPLETED:   Pertinent positives as noted in the HPI. All other systems reviewed and negative.     PHYSICAL EXAM PERFORMED:    BP (!) 141/115   Pulse (!) 114   Temp 99.9 °F (37.7 °C) (Oral)   Resp 15   Ht 5' 5\" (1.651 m)   Wt 145 lb (65.8 kg)   LMP 02/04/2019 (Exact Date)   SpO2 98%   BMI 24.13 kg/m²     General appearance:  No apparent distress, appears stated age and cooperative. HEENT:  Normal cephalic, atraumatic without obvious deformity. Pupils equal, round, and reactive to light. Extra ocular muscles intact. Conjunctivae/corneas clear. Neck: Supple, with full range of motion. No jugular venous distention. Trachea midline. Respiratory:  Normal respiratory effort. Clear to auscultation, bilaterally without Rales/Wheezes/Rhonchi. Cardiovascular:  Regular rate and rhythm with normal S1/S2 without murmurs, rubs or gallops. Abdomen: No CVA tenderness. Soft, non-tender, non-distended with normal bowel sounds. Musculoskeletal:  No clubbing, cyanosis or edema bilaterally. Full range of motion without deformity. Skin: Skin color, texture, turgor normal.  No rashes or lesions. Neurologic:  Neurovascularly intact without any focal sensory/motor deficits. Cranial nerves: II-XII intact, grossly non-focal.  Psychiatric:  Alert and oriented, thought content appropriate, normal insight  Capillary Refill: Brisk,3 seconds, normal  Peripheral Pulses: +2 palpable, equal bilaterally       Labs:     Recent Labs     03/05/23  0710   WBC 8.5   HGB 13.8   HCT 40.5        Recent Labs     03/05/23  0710   *   K 4.0   CL 96*   CO2 25   BUN 8   CREATININE 0.6   CALCIUM 9.5     Recent Labs     03/05/23  0710   AST 12*   ALT 11   BILITOT 0.4   ALKPHOS 93     No results for input(s): INR in the last 72 hours. No results for input(s): Moisés Ruslanes in the last 72 hours.     Urinalysis:      Lab Results   Component Value Date/Time    NITRU Negative 03/05/2023 08:00 AM    WBCUA 6-10 01/18/2014 01:10 PM    BACTERIA 1+ 01/18/2014 01:10 PM    RBCUA 0-2 01/18/2014 01:10 PM    BLOODU Negative 03/05/2023 08:00 AM    SPECGRAV 1.010 03/05/2023 08:00 AM    GLUCOSEU Negative 03/05/2023 08:00 AM       Radiology:     CXR: I have reviewed the CXR with the following interpretation: See chart  EKG:  I have reviewed the EKG with the following interpretation: See chart CT ABDOMEN PELVIS W IV CONTRAST Additional Contrast? None   Preliminary Result   1. 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 New stranding within adjacent mesenteric fat raises   possibility of superimposed inflammatory change/infection involving the   cystic lesion. 2. No radiographic finding to suggest presence of pancreatitis. 3. Nonobstructing right-sided intrarenal calculi. 4. Probable 1.8 cm involuting right ovarian cyst.             Consults:    IP CONSULT TO OB GYN  IP CONSULT TO RADIOLOGY  IP CONSULT TO UROLOGY  IP CONSULT TO SOCIAL WORK    ASSESSMENT:    Active Hospital Problems    Diagnosis Date Noted    Renal cyst [N28.1] 03/05/2023     Priority: Medium         PLAN:    Left suprarenal cyst  -Known chronic cyst however CT abdomen/pelvis demonstrated fat stranding concerning for inflammatory versus infection. Initiated on IV Rocephin. urology consulted from the ED and no improvement in symptoms possible need for drainage.  -Pain control bowel regimen    Alcohol abuse  -CIWA protocol with as needed Ativan  -Patient reports drinking 1 bottle of wine every night, wishes to quit, has done outpatient rehab before, social work consult for available resources  -Supportive care  -Ethanol negative    Anxiety and depression  -Continue home desvenlafaxine    DVT Prophylaxis: Lovenox  Diet: Diet NPO  Code Status: Prior    PT/OT Eval Status: Not ordered    Dispo -2 to 3 days       DOUG Tay    Thank you Alem Buenrostro DO for the opportunity to be involved in this patient's care. If you have any questions or concerns please feel free to contact me at [phone].

## 2023-03-05 NOTE — PROGRESS NOTES
Perfect Serve sent to DOUG Tay d/t BP elevated and no orders in place for prn antihypertensives. Also asking if he wants to order tele since patient has had tachycardia since admission.

## 2023-03-05 NOTE — ED PROVIDER NOTES
[facility]  ED  EMERGENCY DEPARTMENT ENCOUNTER        Patient Name: Purnima Jenkins  MRN: [id number]  Birthdate 1980  Date of evaluation: 3/5/2023  Provider: Nilson Lehman MD  PCP: Aden Basilio DO  Note Started: 7:07 AM EST 3/5/23    CHIEF COMPLAINT       Abdominal Pain (Upper abdominal pain radiating over entire abdomen started Friday and now getting worse)      HISTORY OF PRESENT ILLNESS: 1 or more Elements     History from : Patient    Limitations to history : None    Purnima Jenkins is a 42 y.o. female who presents for evaluation of abdominal pain.  Patient states that she had upper abdominal pain starting on Friday however it has steadily gotten worse.  She denies any fever, nausea or vomiting.  She states that she does drink a bottle of wine on a nightly basis.  She denies prior history of pancreatitis.  No prior history of gastric ulcer.  She has had previous hysterectomy no other abdominal surgeries.  No hematemesis.  No blood in the stools.    Nursing Notes were all reviewed and agreed with or any disagreements were addressed in the HPI.    REVIEW OF SYSTEMS :      Review of Systems    Positives and Pertinent negatives as per HPI.     SURGICAL HISTORY     Past Surgical History:   Procedure Laterality Date    BREAST SURGERY  1/2012    benign tumor removed left breast    EYE SURGERY Bilateral 2002    lasik    HYSTERECTOMY ABDOMINAL N/A 2/21/2019    DAVINCI ROBOTIC TOTAL LAPAROSCOPIC HYSTERECTOMY WITH BILATERAL SALPINGECTOMY,  WITH REMOVAL OF INTRAUTERINE DEVICE, AND RIGHT OVARIAN CYSTECTOMY                **LATEX SENSITIVE**  CPT CODE - 12718 performed by Anne Camarena DO at [facility] OR    KIDNEY CYST REMOVAL  12/13/2013    LITHOTRIPSY  1/2014    TONSILLECTOMY      T&A       CURRENTMEDICATIONS       Previous Medications    DESVENLAFAXINE (KHEDEZLA) 50 MG TB24 EXTENDED RELEASE TABLET    Take 1 tablet by mouth daily    LEVOCETIRIZINE (XYZAL) 5 MG TABLET    TAKE 2  TABLETS BY MOUTH EVERY NIGHT    MULTIPLE VITAMINS-MINERALS (THERAPEUTIC MULTIVITAMIN-MINERALS) TABLET    Take 1 tablet by mouth daily       ALLERGIES     Latex, Penicillins, Bupropion, Macrobid [nitrofurantoin], Vancomycin, Ciprofloxacin, Other, and Sulfa antibiotics    FAMILYHISTORY       Family History   Problem Relation Age of Onset    High Blood Pressure Mother     High Blood Pressure Maternal Grandmother     Cancer Maternal Grandmother         colon    Heart Disease Maternal Grandfather     No Known Problems Father     No Known Problems Brother     No Known Problems Paternal Grandfather     No Known Problems Paternal Grandmother         SOCIAL HISTORY       Social History     Tobacco Use    Smoking status: Never    Smokeless tobacco: Never    Tobacco comments:     not applicable   Vaping Use    Vaping Use: Never used   Substance Use Topics    Alcohol use: Yes     Alcohol/week: 5.0 standard drinks     Types: 5 Glasses of wine per week     Comment: mild alcoholic before COVID, entered treatment; always leland    Drug use: No       SCREENINGS        Yomi Coma Scale  Eye Opening: Spontaneous  Best Verbal Response: Oriented  Best Motor Response: Obeys commands  Yomi Coma Scale Score: 15                CIWA Assessment  BP: (!) 141/115  Heart Rate: (!) 114           PHYSICAL EXAM  1 or more Elements     ED Triage Vitals [03/05/23 0705]   BP Temp Temp Source Heart Rate Resp SpO2 Height Weight   (!) 164/112 99.6 °F (37.6 °C) Oral (!) 138 20 100 % 5' 5\" (1.651 m) 145 lb (65.8 kg)       General: No acute distress. Alert and Oriented. Appears stated age. HEENT: No difficulty tolerating oral secretions. Cardiac: Tachycardic rate and rhythm. Chest: No respiratory distress. No increased work of breathing. No use of accessory muscles for respiration. Abdomen: Soft, epigastric tenderness, nondistended, non-peritonitic. Extremities:No significant lower extremity edema. Lower extremities are symmetric.    Neuro: Moving all extremities. No focal deficits. Speech is clear. Skin:No rash, no erythema  Psych: Calm and cooperative. DIAGNOSTIC RESULTS   LABS:    Labs Reviewed   CBC WITH AUTO DIFFERENTIAL - Abnormal; Notable for the following components:       Result Value    RBC 3.99 (*)     .4 (*)     MCH 34.5 (*)     All other components within normal limits   COMPREHENSIVE METABOLIC PANEL W/ REFLEX TO MG FOR LOW K - Abnormal; Notable for the following components:    Sodium 132 (*)     Chloride 96 (*)     Glucose 111 (*)     AST 12 (*)     All other components within normal limits   URINALYSIS WITH REFLEX TO CULTURE - Abnormal; Notable for the following components:    Clarity, UA SL CLOUDY (*)     All other components within normal limits   LIPASE   LACTIC ACID   ETHANOL       When ordered only abnormal lab results are displayed. All other labs were within normal range or not returned as of this dictation. RADIOLOGY:   Non-plain film images such as CT, Ultrasound and MRI are read by the radiologist. Plain radiographic images are visualized and preliminarily interpreted by the ED Provider with the below findings:        Interpretation per the Radiologist below, if available at the time of this note:    CT ABDOMEN PELVIS W IV CONTRAST Additional Contrast? None   Preliminary Result   1. 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 New stranding within adjacent mesenteric fat raises   possibility of superimposed inflammatory change/infection involving the   cystic lesion. 2. No radiographic finding to suggest presence of pancreatitis. 3. Nonobstructing right-sided intrarenal calculi. 4. Probable 1.8 cm involuting right ovarian cyst.           No results found. Bedside Ultrasound, as interpreted by me, if performed:    No results found.     PROCEDURES     Unless otherwise noted below, none     Procedures    CRITICAL CARE TIME     I personally spent a total of 0 minutes of critical care time in obtaining history, performing a physical exam, bedside monitoring of interventions, collecting and interpreting tests and discussion with consultants but excluding time spent performing procedures, treating other patients and teaching time. PAST MEDICAL HISTORY      has a past medical history of Abnormal Pap smear of cervix, Alcohol abuse (8/20/2020), Alcohol withdrawal syndrome with complication ([facility] [address] Anemia, Anxiety (4/3/2020), Asthma (1/12/2011), Breast disorder, Post partum depression, Post partum depression, PTSD (post-traumatic stress disorder), and Renal cyst.     EMERGENCY DEPARTMENT COURSE and DIFFERENTIAL DIAGNOSIS/MDM:     Vitals:    Vitals:    03/05/23 0952 03/05/23 0953 03/05/23 1001 03/05/23 1010   BP: (!) 139/113  (!) 139/133 (!) 141/115   Pulse: (!) 114 (!) 109 (!) 112 (!) 114   Resp: 12  14 15   Temp:   99.9 °F (37.7 °C)    TempSrc:   Oral    SpO2: 99%  100% 98%   Weight:       Height:           Patient was treated with and given the following medications:  Medications   cefTRIAXone (ROCEPHIN) 1,000 mg in sodium chloride 0.9 % 50 mL IVPB (mini-bag) (1,000 mg IntraVENous New Bag 3/5/23 1059)   lactated ringers bolus (0 mLs IntraVENous Stopped 3/5/23 0845)   morphine sulfate (PF) injection 4 mg (4 mg IntraVENous Given 3/5/23 0717)   ondansetron (ZOFRAN) injection 4 mg (4 mg IntraVENous Given 3/5/23 0717)   pantoprazole (PROTONIX) injection 40 mg (40 mg IntraVENous Given 3/5/23 0717)   iopamidol (ISOVUE-370) 76 % injection 75 mL (75 mLs IntraVENous Given 3/5/23 0748)   lactated ringers bolus (0 mLs IntraVENous Stopped 3/5/23 1054)   HYDROmorphone (DILAUDID) injection 0.5 mg (0.5 mg IntraVENous Given 3/5/23 0958)             Is this patient to be included in the SEP-1 Core Measure due to severe sepsis or septic shock?    No   Exclusion criteria - the patient is NOT to be included for SEP-1 Core Measure due to: Alternative explanation for abnormal labs/vitals that do not relate to sepsis, see MDM for further explanation    CC/HPI Summary, DDx, ED Course, and Reassessment:     58-year-old female presenting for evaluation of upper abdominal pain. Patient arrives hypertensive, tachycardic. She has a low-grade fever. On exam, patient has epigastric tenderness. We will obtain laboratory evaluation, CT abdomen pelvis for further evaluation of her symptoms. She will be initiated on Protonix, IV fluids, Zofran and morphine for management of her symptoms. The differential diagnosis associated with the patient's presentation includes, but is not limited to: Peptic ulcer disease, gastritis, pancreatitis, bowel obstruction, gallbladder disease     CONSULTS: (Who and What was discussed)  IP CONSULT TO OB GYN  IP CONSULT TO RADIOLOGY  IP CONSULT TO UROLOGY  IP CONSULT TO SOCIAL WORK    Discussion with Other Professionals : Admitting Team Dr. Michelle Epstein, Consultant Dr. Alen Tijerina, and Radiologist Dr. Prosper Sanchez        Social Determinants : None    Patient's care impacted by chronic condition(s): Alcohol use disorder    Records Reviewed : None    Clinical information obtained from an independent historian. Disposition Considerations (include 1 Tests not done, Shared Decision Making, Pt Expectation of Test or Tx.):     Lactate was normal.  No significant leukocytosis. CT abdomen pelvis resulted showing chronic left-sided large suprarenal cyst there is concerns for potential infection surrounding the cyst.  Patient has had persistent Tachycardia despite IV fluids. She has had no fever. She has been hypertensive. It may be too early in time for explanation of her symptoms to be explained to alcohol withdrawal and I am concerned that the infected cyst could be causing her persistent tachycardia.   I spoke with on-call urology, Dr. Alen Tijerina to advised on initiation of ceftriaxone, admission for continued monitoring and potential need for surgical intervention. Patient is agreeable with this plan. She has required additional pain medicines for pain control. I am the Primary Clinician of Record. FINAL IMPRESSION      1. Pain of upper abdomen    2. Adrenal gland cyst ([address]    3. Tachycardia          DISPOSITION/PLAN     DISPOSITION Admitted 03/05/2023 10:47:51 AM      PATIENT REFERRED TO:  No follow-up provider specified. DISCHARGE MEDICATIONS:  Patient was given scripts for the following medications. I counseled patient how to take these medications:  New Prescriptions    No medications on file       DISCONTINUED MEDICATIONS:  Discontinued Medications    No medications on file              (This chart was generated in part by using Dragon Dictation system and may contain errors related to that system including errors in grammar, punctuation, and spelling, as well as words and phrases that may be inappropriate.  If there are any questions or concerns please feel free to contact the dictating provider for clarification.)    MD Flor Pardo MD  03/05/23 1121

## 2023-03-05 NOTE — CONSULTS
1012 - called [facility] per consult  Re: question regarding CT read  [address] - Dr Hans Chávez called back to speak with Dr Moncho Bonilla

## 2023-03-05 NOTE — CONSULTS
7833 - PS to Dr Isaac Moore at [facility]  Re: adrenal cyst, concern for infection  9869 - Dr Isaac Moore called back to speak with Dr Michaela Gupta

## 2023-03-05 NOTE — CONSULTS
9737 - called [facility]  Re: ovarian cyst  7533 - Dr Angel Thurman called back to speak with Dr Adan Hairston

## 2023-03-05 NOTE — PROGRESS NOTES
Received page from ER regarding probable admission.  Forwarded to Dr. Susan CAMACHO, admitting team.

## 2023-03-06 LAB
A/G RATIO: 1.2 (ref 1.1–2.2)
ALBUMIN SERPL-MCNC: 3.3 G/DL (ref 3.4–5)
ALP BLD-CCNC: 70 U/L (ref 40–129)
ALT SERPL-CCNC: 6 U/L (ref 10–40)
ANION GAP SERPL CALCULATED.3IONS-SCNC: 7 MMOL/L (ref 3–16)
AST SERPL-CCNC: 9 U/L (ref 15–37)
BASOPHILS ABSOLUTE: 0 K/UL (ref 0–0.2)
BASOPHILS RELATIVE PERCENT: 0.4 %
BILIRUB SERPL-MCNC: 0.3 MG/DL (ref 0–1)
BUN BLDV-MCNC: 9 MG/DL (ref 7–20)
CALCIUM SERPL-MCNC: 8.7 MG/DL (ref 8.3–10.6)
CHLORIDE BLD-SCNC: 100 MMOL/L (ref 99–110)
CO2: 29 MMOL/L (ref 21–32)
CREAT SERPL-MCNC: 0.6 MG/DL (ref 0.6–1.1)
EOSINOPHILS ABSOLUTE: 0.1 K/UL (ref 0–0.6)
EOSINOPHILS RELATIVE PERCENT: 1.4 %
GFR SERPL CREATININE-BSD FRML MDRD: >60 ML/MIN/{1.73_M2}
GLUCOSE BLD-MCNC: 90 MG/DL (ref 70–99)
HCT VFR BLD CALC: 34 % (ref 36–48)
HEMOGLOBIN: 11.6 G/DL (ref 12–16)
LYMPHOCYTES ABSOLUTE: 1.7 K/UL (ref 1–5.1)
LYMPHOCYTES RELATIVE PERCENT: 25.1 %
MCH RBC QN AUTO: 34.9 PG (ref 26–34)
MCHC RBC AUTO-ENTMCNC: 34.1 G/DL (ref 31–36)
MCV RBC AUTO: 102.3 FL (ref 80–100)
MONOCYTES ABSOLUTE: 0.6 K/UL (ref 0–1.3)
MONOCYTES RELATIVE PERCENT: 9.7 %
NEUTROPHILS ABSOLUTE: 4.2 K/UL (ref 1.7–7.7)
NEUTROPHILS RELATIVE PERCENT: 63.4 %
PDW BLD-RTO: 13.3 % (ref 12.4–15.4)
PLATELET # BLD: 191 K/UL (ref 135–450)
PMV BLD AUTO: 8 FL (ref 5–10.5)
POTASSIUM REFLEX MAGNESIUM: 3.9 MMOL/L (ref 3.5–5.1)
RBC # BLD: 3.32 M/UL (ref 4–5.2)
SODIUM BLD-SCNC: 136 MMOL/L (ref 136–145)
TOTAL PROTEIN: 6.1 G/DL (ref 6.4–8.2)
WBC # BLD: 6.7 K/UL (ref 4–11)

## 2023-03-06 PROCEDURE — 6360000002 HC RX W HCPCS: Performed by: PHYSICIAN ASSISTANT

## 2023-03-06 PROCEDURE — 6370000000 HC RX 637 (ALT 250 FOR IP): Performed by: PHYSICIAN ASSISTANT

## 2023-03-06 PROCEDURE — 2580000003 HC RX 258: Performed by: PHYSICIAN ASSISTANT

## 2023-03-06 PROCEDURE — 36415 COLL VENOUS BLD VENIPUNCTURE: CPT

## 2023-03-06 PROCEDURE — 1200000000 HC SEMI PRIVATE

## 2023-03-06 PROCEDURE — 85025 COMPLETE CBC W/AUTO DIFF WBC: CPT

## 2023-03-06 PROCEDURE — 80053 COMPREHEN METABOLIC PANEL: CPT

## 2023-03-06 RX ADMIN — Medication 100 MG: at 08:42

## 2023-03-06 RX ADMIN — OXYCODONE HYDROCHLORIDE 10 MG: 5 TABLET ORAL at 05:26

## 2023-03-06 RX ADMIN — ENOXAPARIN SODIUM 40 MG: 100 INJECTION SUBCUTANEOUS at 08:42

## 2023-03-06 RX ADMIN — SODIUM CHLORIDE, PRESERVATIVE FREE 10 ML: 5 INJECTION INTRAVENOUS at 08:45

## 2023-03-06 RX ADMIN — LEVOCETIRIZINE DIHYDROCHLORIDE 10 MG: 5 TABLET, FILM COATED ORAL at 19:57

## 2023-03-06 RX ADMIN — HYDROMORPHONE HYDROCHLORIDE 0.5 MG: 0.5 INJECTION, SOLUTION INTRAMUSCULAR; INTRAVENOUS; SUBCUTANEOUS at 15:02

## 2023-03-06 RX ADMIN — DESVENLAFAXINE 50 MG: 50 TABLET, EXTENDED RELEASE ORAL at 08:40

## 2023-03-06 RX ADMIN — ACETAMINOPHEN 325MG 650 MG: 325 TABLET ORAL at 08:41

## 2023-03-06 RX ADMIN — SODIUM CHLORIDE: 9 INJECTION, SOLUTION INTRAVENOUS at 09:39

## 2023-03-06 RX ADMIN — CEFTRIAXONE SODIUM 1000 MG: 1 INJECTION, POWDER, FOR SOLUTION INTRAMUSCULAR; INTRAVENOUS at 09:41

## 2023-03-06 RX ADMIN — HYDROMORPHONE HYDROCHLORIDE 0.5 MG: 0.5 INJECTION, SOLUTION INTRAMUSCULAR; INTRAVENOUS; SUBCUTANEOUS at 19:56

## 2023-03-06 RX ADMIN — SODIUM CHLORIDE, PRESERVATIVE FREE 10 ML: 5 INJECTION INTRAVENOUS at 19:57

## 2023-03-06 ASSESSMENT — PAIN - FUNCTIONAL ASSESSMENT
PAIN_FUNCTIONAL_ASSESSMENT: PREVENTS OR INTERFERES SOME ACTIVE ACTIVITIES AND ADLS
PAIN_FUNCTIONAL_ASSESSMENT: ACTIVITIES ARE NOT PREVENTED
PAIN_FUNCTIONAL_ASSESSMENT: PREVENTS OR INTERFERES SOME ACTIVE ACTIVITIES AND ADLS
PAIN_FUNCTIONAL_ASSESSMENT: ACTIVITIES ARE NOT PREVENTED

## 2023-03-06 ASSESSMENT — PAIN DESCRIPTION - LOCATION
LOCATION: ABDOMEN;HEAD
LOCATION: ABDOMEN

## 2023-03-06 ASSESSMENT — PAIN SCALES - GENERAL
PAINLEVEL_OUTOF10: 4
PAINLEVEL_OUTOF10: 4
PAINLEVEL_OUTOF10: 7
PAINLEVEL_OUTOF10: 7
PAINLEVEL_OUTOF10: 0
PAINLEVEL_OUTOF10: 7
PAINLEVEL_OUTOF10: 7
PAINLEVEL_OUTOF10: 2

## 2023-03-06 ASSESSMENT — PAIN DESCRIPTION - ORIENTATION
ORIENTATION: MID;LOWER
ORIENTATION: LEFT;ANTERIOR
ORIENTATION: LEFT
ORIENTATION: LEFT
ORIENTATION: ANTERIOR
ORIENTATION: LEFT;UPPER

## 2023-03-06 ASSESSMENT — PAIN DESCRIPTION - DESCRIPTORS
DESCRIPTORS: ACHING
DESCRIPTORS: ACHING
DESCRIPTORS: ACHING;CRAMPING
DESCRIPTORS: CRAMPING;STABBING
DESCRIPTORS: ACHING
DESCRIPTORS: ACHING

## 2023-03-06 NOTE — CARE COORDINATION
Case Management Assessment  Initial Evaluation    Date/Time of Evaluation: 3/6/2023 2:38 PM  Assessment Completed by: CARLOS Heath    If patient is discharged prior to next notation, then this note serves as note for discharge by case management. Patient Name: Lizbet Smith                   YOB: 1980  Diagnosis: Renal cyst [N28.1]  Tachycardia [R00.0]  Adrenal gland cyst ([address] [E27.8]  Pain of upper abdomen [R10.10]                   Date / Time: 3/5/2023  7:01 AM    Patient Admission Status: Inpatient   Readmission Risk (Low < 19, Mod (19-27), High > 27): Readmission Risk Score: 7.7    Current PCP: Jorge Luis Burdick, DO  PCP verified by CM? Yes    Chart Reviewed: Yes      History Provided by: Patient  Patient Orientation: Alert and Oriented, Person, Place, Situation    Patient Cognition: Alert    Hospitalization in the last 30 days (Readmission):  No      Advance Directives:      Code Status: Full Code   Patient's Primary Decision Maker is: Legal Next of Kin      Discharge Planning:    Patient lives with: Spouse/Significant Other, Children Type of Home: House  Primary Care Giver: Self  Patient Support Systems include: Spouse/Significant Other   Current Financial resources: Other (Comment)  Current community resources: [employer]  Current services prior to admission: None            Type of Home Care services:  None    ADLS  Prior functional level: Independent in ADLs/IADLs  Current functional level: Independent in ADLs/IADLs  Family can provide assistance at DC: Yes  Would you like Case Management to discuss the discharge plan with any other family members/significant others, and if so, who? Yes Belia Hammonds  Plans to Return to Present Housing: Yes  Potential Assistance needed at discharge:  Other (Comment)  Patient expects to discharge to: [address] [place] [address] for transportation at discharge: Family    Financial    Payor: Zuly Ramsey / Plan: Loren Marino / Product Type: *No Product type* /     Potential assistance Purchasing Medications: No  Meds-to-Beds request: Yes      [place] [address] [facility], [address] [place] [address] [address] [facility] [phone]  [address] [place] [id number]  Phone: [phone] Fax: [phone]    Notes:    Factors facilitating achievement of predicted outcomes: Family support    Barriers to discharge: Pain    Additional Case Management Notes: Patient from home with spouse IPTA. PAtietn open to discussion of SA resc. Patient is thinking outpatient rehab would be most contusive to life style resc provided contact number left for follow up questions. Denies additional needs.     The Plan for Transition of Care is related to the following treatment goals of Renal cyst [N28.1]  Tachycardia [R00.0]  Adrenal gland cyst (HCC) [E27.8]  Pain of upper abdomen [R10.10]      The Patient and/or Patient Representative Agree with the Discharge Plan?  yes    Kristen Arshad [place]  Case Management Department  Ph: [phone] Fax: [phone]

## 2023-03-06 NOTE — ACP (ADVANCE CARE PLANNING)
Advance Care Planning     General Advance Care Planning (ACP) Conversation    Date of Conversation: 3/5/2023  Conducted with: Patient with Decision Making Capacity    Healthcare Decision Maker:    Primary Decision Maker: Bob Mehta - Spouse - [phone]  Click here to complete [address] including selection of the Healthcare Decision Maker Relationship (ie \"Primary\"). Today we documented Decision Maker(s) consistent with Legal Next of Kin hierarchy.     Content/Action Overview:  DECLINED ACP Conversation - will revisit periodically  Reviewed DNR/DNI and patient elects Full Code (Attempt Resuscitation)      Length of Voluntary ACP Conversation in minutes:  <16 minutes (Non-Billable)    CARLOS Morris    Patient is aware can alert care team if wishes to complete formal AD

## 2023-03-06 NOTE — PROGRESS NOTES
Hospitalist Progress Note      PCP: Jan Jean-Baptiste DO    Date of Admission: 3/5/2023    Chief Complaint:   Abdominal pain     Hospital Course:   43 y.o. female history of anxiety with depression and alcohol abuse who presented to [facility] with complaints of abdominal pain. She states her pain started Friday evening under her left ribs. She describes the pain as a dull ache and intermittent at the time. Her pain progressively worsened yesterday and became constant. She rates as 8 out of 10 and states it radiates to her back. Endorses nausea as well. History of known renal cyst, CT abdomen/pelvis in ED demonstrated laboratory versus infectious changes related to cyst.  Started on IV antibiotics and urology consulted. Denies fevers, chills, chest pain, shortness of breath, urinary symptoms, or focal neurologic deficit. Will be admitted for further work-up and evaluation. Subjective:   Seen resting in bed with  at bedside, still reporting 6 out of 10 left-sided flank pain, denies chest pain, shortness of breath, or not nausea/vomiting. Updated on plan of care.       Medications:  Reviewed    Infusion Medications    sodium chloride       Scheduled Medications    desvenlafaxine succinate  50 mg Oral Daily    sodium chloride flush  5-40 mL IntraVENous 2 times per day    thiamine  100 mg Oral Daily    enoxaparin  40 mg SubCUTAneous Daily    levocetirizine  10 mg Oral Nightly     PRN Meds: sodium chloride flush, sodium chloride, ondansetron **OR** ondansetron, polyethylene glycol, acetaminophen **OR** acetaminophen, LORazepam **OR** LORazepam **OR** LORazepam **OR** LORazepam **OR** LORazepam **OR** LORazepam **OR** LORazepam **OR** LORazepam, oxyCODONE **OR** oxyCODONE, labetalol      Intake/Output Summary (Last 24 hours) at 3/6/2023 0801  Last data filed at 3/5/2023 2039  Gross per 24 hour   Intake 490 ml   Output --   Net 490 ml       Physical Exam Performed:    /72 Pulse 96   Temp 98.8 °F (37.1 °C) (Oral)   Resp 18   Ht 5' 5\" (1.651 m)   Wt 160 lb 12.8 oz (72.9 kg)   LMP 02/04/2019 (Exact Date)   SpO2 99%   BMI 26.76 kg/m²     General appearance: No apparent distress, appears stated age and cooperative. HEENT: Pupils equal, round, and reactive to light. Conjunctivae/corneas clear. Neck: Supple, with full range of motion. No jugular venous distention. Trachea midline. Respiratory:  Normal respiratory effort. Clear to auscultation, bilaterally without Rales/Wheezes/Rhonchi. Cardiovascular: Regular rate and rhythm with normal S1/S2 without murmurs, rubs or gallops. Abdomen: Mild distention LUQ tender to palpation. Otherwise soft, non-tender, non-distended with normal bowel sounds. Musculoskeletal: No clubbing, cyanosis or edema bilaterally. Full range of motion without deformity. Skin: Skin color, texture, turgor normal.  No rashes or lesions. Neurologic:  Neurovascularly intact without any focal sensory/motor deficits. Cranial nerves: II-XII intact, grossly non-focal.  Psychiatric: Alert and oriented, thought content appropriate, normal insight  Capillary Refill: Brisk, 3 seconds, normal   Peripheral Pulses: +2 palpable, equal bilaterally       Labs:   Recent Labs     03/05/23  0710 03/06/23  0506   WBC 8.5 6.7   HGB 13.8 11.6*   HCT 40.5 34.0*    191     Recent Labs     03/05/23  0710 03/06/23  0506   * 136   K 4.0 3.9   CL 96* 100   CO2 25 29   BUN 8 9   CREATININE 0.6 0.6   CALCIUM 9.5 8.7     Recent Labs     03/05/23  0710 03/06/23  0506   AST 12* 9*   ALT 11 6*   BILITOT 0.4 0.3   ALKPHOS 93 70     No results for input(s): INR in the last 72 hours. No results for input(s): Adriana [place] in the last 72 hours.     Urinalysis:      Lab Results   Component Value Date/Time    NITRU Negative 03/05/2023 08:00 AM    WBCUA 6-10 01/18/2014 01:10 PM    BACTERIA 1+ 01/18/2014 01:10 PM    RBCUA 0-2 01/18/2014 01:10 PM    BLOODU Negative 03/05/2023 08:00 AM    SPECGRAV 1.010 03/05/2023 08:00 AM    GLUCOSEU Negative 03/05/2023 08:00 AM       Radiology:  CT ABDOMEN PELVIS W IV CONTRAST Additional Contrast? None   Preliminary Result   1. 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 New stranding within adjacent mesenteric fat raises   possibility of superimposed inflammatory change/infection involving the   cystic lesion. 2. No radiographic finding to suggest presence of pancreatitis. 3. Nonobstructing right-sided intrarenal calculi. 4. Probable 1.8 cm involuting right ovarian cyst.             IP CONSULT TO OB GYN  IP CONSULT TO RADIOLOGY  IP CONSULT TO UROLOGY  IP CONSULT TO SOCIAL WORK    Assessment/Plan:    Active Hospital Problems    Diagnosis     Renal cyst [N28.1]      Priority: Medium     Left suprarenal cyst  -Known chronic cyst however CT abdomen/pelvis demonstrated fat stranding concerning for inflammatory versus infection. Initiated on IV Rocephin.   -Urology consulted, continue antibiotics, outpatient follow-up for possible removal/decortication of cyst  -Pain control bowel regimen     Alcohol abuse  -[facility] protocol with as needed Ativan  -Patient reports drinking 1 bottle of wine every night, wishes to quit, has done outpatient rehab before, social work consult for available resources  -Supportive care  -Ethanol negative on arrival     Anxiety and depression  -Continue home desvenlafaxine    DVT Prophylaxis: Lovenox  Diet: ADULT DIET;  Regular  Code Status: Full Code  PT/OT Eval Status: Not ordered    Dispo -1 to 2 days pending pain control and IV antibiotics    Appropriate for A1 Discharge Unit: DOUG Jaime

## 2023-03-06 NOTE — PROGRESS NOTES
Shift assessment completed. Pt A&O, VSS on RA. Pt c/o abdominal pain, PRN medications given per MAR. Denies any further needs at this time. Bed locked and in lowest position. Call light & bedside table are within reach.

## 2023-03-07 PROCEDURE — 6360000002 HC RX W HCPCS: Performed by: PHYSICIAN ASSISTANT

## 2023-03-07 PROCEDURE — 6360000002 HC RX W HCPCS: Performed by: NURSE PRACTITIONER

## 2023-03-07 PROCEDURE — 6370000000 HC RX 637 (ALT 250 FOR IP): Performed by: PHYSICIAN ASSISTANT

## 2023-03-07 PROCEDURE — 1200000000 HC SEMI PRIVATE

## 2023-03-07 PROCEDURE — 6370000000 HC RX 637 (ALT 250 FOR IP): Performed by: NURSE PRACTITIONER

## 2023-03-07 PROCEDURE — 2580000003 HC RX 258: Performed by: PHYSICIAN ASSISTANT

## 2023-03-07 RX ORDER — CEPHALEXIN 250 MG/1
250 CAPSULE ORAL EVERY 6 HOURS SCHEDULED
Status: DISCONTINUED | OUTPATIENT
Start: 2023-03-07 | End: 2023-03-08 | Stop reason: HOSPADM

## 2023-03-07 RX ORDER — IBUPROFEN 600 MG/1
600 TABLET ORAL 3 TIMES DAILY PRN
Qty: 24 TABLET | Refills: 0 | Status: SHIPPED | OUTPATIENT
Start: 2023-03-07 | End: 2023-03-17

## 2023-03-07 RX ORDER — CEPHALEXIN 250 MG/1
250 CAPSULE ORAL 4 TIMES DAILY
Qty: 40 CAPSULE | Refills: 0 | Status: SHIPPED | OUTPATIENT
Start: 2023-03-07 | End: 2023-03-17

## 2023-03-07 RX ORDER — OXYCODONE HYDROCHLORIDE 5 MG/1
5 TABLET ORAL EVERY 4 HOURS PRN
Qty: 15 TABLET | Refills: 0 | Status: SHIPPED | OUTPATIENT
Start: 2023-03-07 | End: 2023-03-10

## 2023-03-07 RX ORDER — ONDANSETRON 4 MG/1
4 TABLET, ORALLY DISINTEGRATING ORAL EVERY 8 HOURS PRN
Qty: 15 TABLET | Refills: 0 | Status: SHIPPED | OUTPATIENT
Start: 2023-03-07

## 2023-03-07 RX ORDER — KETOROLAC TROMETHAMINE 30 MG/ML
30 INJECTION, SOLUTION INTRAMUSCULAR; INTRAVENOUS EVERY 6 HOURS PRN
Status: DISCONTINUED | OUTPATIENT
Start: 2023-03-07 | End: 2023-03-08 | Stop reason: HOSPADM

## 2023-03-07 RX ADMIN — HYDROMORPHONE HYDROCHLORIDE 0.5 MG: 0.5 INJECTION, SOLUTION INTRAMUSCULAR; INTRAVENOUS; SUBCUTANEOUS at 22:12

## 2023-03-07 RX ADMIN — LEVOCETIRIZINE DIHYDROCHLORIDE 10 MG: 5 TABLET, FILM COATED ORAL at 20:53

## 2023-03-07 RX ADMIN — Medication 100 MG: at 09:18

## 2023-03-07 RX ADMIN — SODIUM CHLORIDE: 9 INJECTION, SOLUTION INTRAVENOUS at 09:24

## 2023-03-07 RX ADMIN — KETOROLAC TROMETHAMINE 30 MG: 30 INJECTION, SOLUTION INTRAMUSCULAR; INTRAVENOUS at 16:30

## 2023-03-07 RX ADMIN — HYDROMORPHONE HYDROCHLORIDE 0.5 MG: 0.5 INJECTION, SOLUTION INTRAMUSCULAR; INTRAVENOUS; SUBCUTANEOUS at 09:18

## 2023-03-07 RX ADMIN — CEPHALEXIN 250 MG: 250 CAPSULE ORAL at 18:13

## 2023-03-07 RX ADMIN — DESVENLAFAXINE 50 MG: 50 TABLET, EXTENDED RELEASE ORAL at 09:18

## 2023-03-07 RX ADMIN — CEFTRIAXONE SODIUM 1000 MG: 1 INJECTION, POWDER, FOR SOLUTION INTRAMUSCULAR; INTRAVENOUS at 09:25

## 2023-03-07 RX ADMIN — SODIUM CHLORIDE, PRESERVATIVE FREE 10 ML: 5 INJECTION INTRAVENOUS at 20:53

## 2023-03-07 RX ADMIN — ENOXAPARIN SODIUM 40 MG: 100 INJECTION SUBCUTANEOUS at 09:18

## 2023-03-07 RX ADMIN — HYDROMORPHONE HYDROCHLORIDE 0.5 MG: 0.5 INJECTION, SOLUTION INTRAMUSCULAR; INTRAVENOUS; SUBCUTANEOUS at 18:13

## 2023-03-07 RX ADMIN — OXYCODONE HYDROCHLORIDE 5 MG: 5 TABLET ORAL at 14:33

## 2023-03-07 ASSESSMENT — PAIN DESCRIPTION - DESCRIPTORS
DESCRIPTORS: ACHING;CRAMPING;SHARP
DESCRIPTORS: ACHING;SHARP;SHOOTING
DESCRIPTORS: ACHING;SHARP;SHOOTING

## 2023-03-07 ASSESSMENT — PAIN SCALES - WONG BAKER: WONGBAKER_NUMERICALRESPONSE: 0

## 2023-03-07 ASSESSMENT — PAIN DESCRIPTION - ORIENTATION
ORIENTATION: UPPER;LEFT
ORIENTATION: LEFT;UPPER
ORIENTATION: UPPER;LEFT

## 2023-03-07 ASSESSMENT — PAIN SCALES - GENERAL
PAINLEVEL_OUTOF10: 0
PAINLEVEL_OUTOF10: 8
PAINLEVEL_OUTOF10: 9
PAINLEVEL_OUTOF10: 7
PAINLEVEL_OUTOF10: 7
PAINLEVEL_OUTOF10: 6

## 2023-03-07 ASSESSMENT — PAIN DESCRIPTION - LOCATION
LOCATION: ABDOMEN

## 2023-03-07 ASSESSMENT — PAIN - FUNCTIONAL ASSESSMENT
PAIN_FUNCTIONAL_ASSESSMENT: PREVENTS OR INTERFERES SOME ACTIVE ACTIVITIES AND ADLS
PAIN_FUNCTIONAL_ASSESSMENT: PREVENTS OR INTERFERES SOME ACTIVE ACTIVITIES AND ADLS
PAIN_FUNCTIONAL_ASSESSMENT: ACTIVITIES ARE NOT PREVENTED

## 2023-03-07 NOTE — CARE COORDINATION
Hospital day 2: Patient on C3 re Renal cyst care managed by IM and Urology. Plans to return home with outpatient SA resc, left in room for review. Patient on IV ATB, oral at dc anticipated. Pending pain control. SW following.CARLOS Mcnair

## 2023-03-07 NOTE — DISCHARGE SUMMARY
Hospital Medicine Discharge Summary    Patient ID: Elle Kimball      Patient's PCP: Angie Pham DO    Admit Date: 3/5/2023     Discharge Date:   3/7/23     Admitting Provider: No admitting provider for patient encounter. Discharge Provider: AGATHA Tomlinson - CNP     Discharge Diagnoses: Active Hospital Problems    Diagnosis     Renal cyst [N28.1]      Priority: Medium       The patient was seen and examined on day of discharge and this discharge summary is in conjunction with any daily progress note from day of discharge. Hospital Course:   43 y.o. female history of anxiety with depression and alcohol abuse who presented to [facility] with complaints of abdominal pain. She states her pain started Friday evening under her left ribs. She describes the pain as a dull ache and intermittent at the time. Her pain progressively worsened yesterday and became constant. She rates as 8 out of 10 and states it radiates to her back. Endorses nausea as well. History of known renal cyst, CT abdomen/pelvis in ED demonstrated laboratory versus infectious changes related to cyst.  Started on IV antibiotics and urology consulted. Denies fevers, chills, chest pain, shortness of breath, urinary symptoms, or focal neurologic deficit. Will be admitted for further work-up and evaluation. Treated in the following:     Left suprarenal cyst  -Known chronic cyst however CT abdomen/pelvis demonstrated fat stranding concerning for inflammatory versus infection.   Initiated on IV Rocephin changed to [id number] [facility] [address] with urology   -Urology consulted, continue antibiotics, outpatient follow-up for possible removal/decortication of cyst  -Pain control bowel regimen  - Oral oxy and motrin for home      Alcohol abuse  -CIWA protocol with as needed Ativan  - No issues while IP   -Patient reports drinking 1 bottle of wine every night, wishes to quit, has done outpatient rehab before, social work consult for available resources  -Supportive care  -Ethanol negative on arrival     Anxiety and depression  -Continue home desvenlafaxine           Physical Exam Performed:     BP (!) 139/97   Pulse 91   Temp 99 °F (37.2 °C) (Oral)   Resp 16   Ht 5' 5\" (1.651 m)   Wt 160 lb 12.8 oz (72.9 kg)   LMP 02/04/2019 (Exact Date)   SpO2 98%   BMI 26.76 kg/m²       General appearance:  No apparent distress, appears stated age and cooperative. HEENT:  Normal cephalic, atraumatic without obvious deformity. Pupils equal, round, and reactive to light. Extra ocular muscles intact. Conjunctivae/corneas clear. Neck: Supple, with full range of motion. No jugular venous distention. Trachea midline. Respiratory:  Normal respiratory effort. Clear to auscultation, bilaterally without Rales/Wheezes/Rhonchi. Cardiovascular:  Regular rate and rhythm with normal S1/S2 without murmurs, rubs or gallops. Abdomen: Soft, non-tender, non-distended with normal bowel sounds. Musculoskeletal:  No clubbing, cyanosis or edema bilaterally. Full range of motion without deformity. Skin: Skin color, texture, turgor normal.  No rashes or lesions. Neurologic:  Neurovascularly intact without any focal sensory/motor deficits. Cranial nerves: II-XII intact, grossly non-focal.  Psychiatric:  Alert and oriented, thought content appropriate, normal insight  Capillary Refill: Brisk,< 3 seconds   Peripheral Pulses: +2 palpable, equal bilaterally       Labs:  For convenience and continuity at follow-up the following most recent labs are provided:      CBC:    Lab Results   Component Value Date/Time    WBC 6.7 03/06/2023 05:06 AM    HGB 11.6 03/06/2023 05:06 AM    HCT 34.0 03/06/2023 05:06 AM     03/06/2023 05:06 AM       Renal:    Lab Results   Component Value Date/Time     03/06/2023 05:06 AM    K 3.9 03/06/2023 05:06 AM     03/06/2023 05:06 AM    CO2 29 03/06/2023 05:06 AM    BUN 9 03/06/2023 05:06 AM    CREATININE 0.6 03/06/2023 05:06 AM    CALCIUM 8.7 03/06/2023 05:06 AM         Significant Diagnostic Studies    Radiology:   CT ABDOMEN PELVIS W IV CONTRAST Additional Contrast? None   Final Result   1. 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 New stranding within adjacent mesenteric fat raises   possibility of superimposed inflammatory change/infection involving the   cystic lesion. 2. No radiographic finding to suggest presence of pancreatitis. 3. Nonobstructing right-sided intrarenal calculi. 4. Probable 1.8 cm involuting right ovarian cyst.                Consults:     IP CONSULT TO OB GYN  IP CONSULT TO RADIOLOGY  IP CONSULT TO UROLOGY  IP CONSULT TO SOCIAL WORK    Disposition:  home      Condition at Discharge: Stable    Discharge Instructions/Follow-up:  Urology     Code Status:  Full Code     Activity: activity as tolerated    Diet: regular diet      Discharge Medications:     Current Discharge Medication List             Details   oxyCODONE (ROXICODONE) 5 MG immediate release tablet Take 1 tablet by mouth every 4 hours as needed for Pain for up to 3 days. Max Daily Amount: 30 mg  Qty: 15 tablet, Refills: 0    Comments: Reduce doses taken as pain becomes manageable  Associated Diagnoses: Renal cyst      ibuprofen (ADVIL;MOTRIN) 600 MG tablet Take 1 tablet by mouth 3 times daily as needed for Pain  Qty: 24 tablet, Refills: 0      ondansetron (ZOFRAN-ODT) 4 MG disintegrating tablet Take 1 tablet by mouth every 8 hours as needed for Nausea or Vomiting  Qty: 15 tablet, Refills: 0      cephALEXin (KEFLEX) 250 MG capsule Take 1 capsule by mouth 4 times daily for 10 days  Qty: 40 capsule, Refills: 0                Details   levocetirizine (XYZAL) 5 MG tablet TAKE 2 TABLETS BY MOUTH EVERY NIGHT  Qty: 180 tablet, Refills: 0    Associated Diagnoses:  Allergy, initial encounter      desvenlafaxine (KHEDEZLA) 50 MG TB24 extended release tablet Take 1 tablet by mouth daily  Qty: 30 tablet, Refills: 3             Time Spent on discharge: 35 in the examination, evaluation, counseling and review of medications and discharge plan. Signed:    AGATHA La - CNP   3/7/2023      Thank you Reather Krabbe, DO for the opportunity to be involved in this patient's care. If you have any questions or concerns, please feel free to contact me at [phone].

## 2023-03-07 NOTE — PROGRESS NOTES
03/07/23 1113   Encounter Summary   Encounter Overview/Reason  Initial Encounter   Service Provided For: Patient and family together   Referral/Consult From: Mattie7 [place] Linda Lennon Encounter  03/07/23  (inital visit, spouse at bedside, informed them of Spiritual care services)   Complexity of Encounter Low   Begin Time 1050   End Time  1100   Total Time Calculated 10 min   Assessment/Intervention/Outcome   Assessment Calm;Coping   Intervention Nurtured Hope   Outcome Encouraged     dede Reeves

## 2023-03-07 NOTE — PROGRESS NOTES
Upon entering pt's room to inform her of discharge pt abdominal pain rated 9\10. RN suggested trying Toradol and re-evaluate in 27. Pt agreeable and PRN medication administered. RN entered room to re-evaluate pain and pt not comfortable going home d\t pain level. MD notified.

## 2023-03-07 NOTE — PROGRESS NOTES
Pt assessment completed and charted. VSS. Pt a/o. Pt complains of 7/10 abdominal pain- PRN medication administered, see MAR. Bed in lowest position and wheels locked. Call light within reach. Bedside table within reach. Non-skid footwear in place.  at bedside. Pt denies any other needs at this time. Pt calls out appropriately.

## 2023-03-07 NOTE — PROGRESS NOTES
Urology Progress Note    42F with long standing history of a large left renal cyst, post drainage in the early , now admitted with left upper abdominal/flank pain     Subjective: Sabar Phipps is feeling better overall since receiving IV abx     Vitals:  /72   Pulse 68   Temp 97.4 °F (36.3 °C) (Oral)   Resp 16   Ht 5' 6\" (1.676 m)   Wt 300 lb (136.1 kg)   SpO2 97%   BMI 48.42 kg/m²   Temp  Av.2 °F (36.8 °C)  Min: 97.4 °F (36.3 °C)  Max: 98.7 °F (37.1 °C)    Intake/Output Summary (Last 24 hours) at 3/7/2023 1278  Last data filed at 3/7/2023 0650  Gross per 24 hour   Intake 1323.43 ml   Output -1030 ml   Net 2353.43 ml       Exam:   Physical:  Well developed, well nourished in no acute distress  Mood indicates no abnormalities. Pt doesnt appear depressed  Orientated to time and place      Labs:  WBC:    Lab Results   Component Value Date/Time    WBC 3.8 2023 07:45 AM     Hemoglobin/Hematocrit:    Lab Results   Component Value Date/Time    HGB 7.5 2023 07:45 AM    HCT 23.4 2023 07:45 AM     BMP:    Lab Results   Component Value Date/Time     2023 07:45 AM    K 4.1 2023 07:45 AM    K 4.1 2023 05:24 AM     2023 07:45 AM    CO2 29 2023 07:45 AM    BUN 40 2023 07:45 AM    LABALBU 2.7 2023 05:24 AM    CREATININE 1.8 2023 07:45 AM    CALCIUM 8.4 2023 07:45 AM    GFRAA >60 2017 12:54 PM    GFRAA >60 2013 01:54 PM    LABGLOM 32 2023 07:45 AM     PT/INR:    Lab Results   Component Value Date/Time    PROTIME 17.4 2023 11:09 AM    INR 1.43 2023 11:09 AM     PTT:  No results found for: APTT[APTT    Urinalysis: NEGATIVE      Antibiotic Therapy:  Ceftriaxone     Imaging:   CT AP  1. 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   New stranding within adjacent mesenteric fat raises   possibility of superimposed inflammatory change/infection involving the   cystic lesion. 2. No radiographic finding to suggest presence of pancreatitis. 3. Nonobstructing right-sided intrarenal calculi. 4. Probable 1.8 cm involuting right ovarian cyst.     Impression/Plan:   Left renal versus suprarenal cyst with associated abdominal pain-the cyst wall has calcifications also  - Left renal cyst identified in early 2000s and drained at that point, has remained asymptomatic up until this admission.   Was last seen in our office in 2019 and IR drainage ordered at that time, but never completed 2/2 COVID 19 pandemic   - She continues to feel better with IV abx  -  and patient very interested in removal/decortication of the cyst   - Dr Lisette Al will review and discuss with patient today during rounds     Yelena Alonzo PA-C  [facility]

## 2023-03-08 VITALS
RESPIRATION RATE: 18 BRPM | HEART RATE: 103 BPM | WEIGHT: 160.8 LBS | HEIGHT: 65 IN | DIASTOLIC BLOOD PRESSURE: 88 MMHG | OXYGEN SATURATION: 97 % | BODY MASS INDEX: 26.79 KG/M2 | SYSTOLIC BLOOD PRESSURE: 131 MMHG | TEMPERATURE: 99.3 F

## 2023-03-08 LAB — CORTISOL - AM: 9.2 UG/DL (ref 4.3–22.4)

## 2023-03-08 PROCEDURE — 82533 TOTAL CORTISOL: CPT

## 2023-03-08 PROCEDURE — 6370000000 HC RX 637 (ALT 250 FOR IP): Performed by: NURSE PRACTITIONER

## 2023-03-08 PROCEDURE — 84244 ASSAY OF RENIN: CPT

## 2023-03-08 PROCEDURE — 36415 COLL VENOUS BLD VENIPUNCTURE: CPT

## 2023-03-08 PROCEDURE — 82088 ASSAY OF ALDOSTERONE: CPT

## 2023-03-08 PROCEDURE — 6360000002 HC RX W HCPCS: Performed by: PHYSICIAN ASSISTANT

## 2023-03-08 PROCEDURE — 6370000000 HC RX 637 (ALT 250 FOR IP): Performed by: PHYSICIAN ASSISTANT

## 2023-03-08 PROCEDURE — 2580000003 HC RX 258: Performed by: PHYSICIAN ASSISTANT

## 2023-03-08 PROCEDURE — 82384 ASSAY THREE CATECHOLAMINES: CPT

## 2023-03-08 RX ADMIN — DESVENLAFAXINE 50 MG: 50 TABLET, EXTENDED RELEASE ORAL at 12:18

## 2023-03-08 RX ADMIN — HYDROMORPHONE HYDROCHLORIDE 0.5 MG: 0.5 INJECTION, SOLUTION INTRAMUSCULAR; INTRAVENOUS; SUBCUTANEOUS at 05:48

## 2023-03-08 RX ADMIN — ENOXAPARIN SODIUM 40 MG: 100 INJECTION SUBCUTANEOUS at 12:18

## 2023-03-08 RX ADMIN — CEPHALEXIN 250 MG: 250 CAPSULE ORAL at 12:18

## 2023-03-08 RX ADMIN — SODIUM CHLORIDE, PRESERVATIVE FREE 10 ML: 5 INJECTION INTRAVENOUS at 12:18

## 2023-03-08 RX ADMIN — CEPHALEXIN 250 MG: 250 CAPSULE ORAL at 00:00

## 2023-03-08 RX ADMIN — CEPHALEXIN 250 MG: 250 CAPSULE ORAL at 05:45

## 2023-03-08 RX ADMIN — Medication 100 MG: at 12:18

## 2023-03-08 ASSESSMENT — PAIN DESCRIPTION - ORIENTATION
ORIENTATION: LOWER;LEFT
ORIENTATION: UPPER;LEFT
ORIENTATION: LEFT;UPPER

## 2023-03-08 ASSESSMENT — PAIN SCALES - GENERAL
PAINLEVEL_OUTOF10: 4
PAINLEVEL_OUTOF10: 7
PAINLEVEL_OUTOF10: 4

## 2023-03-08 ASSESSMENT — PAIN DESCRIPTION - LOCATION
LOCATION: ABDOMEN

## 2023-03-08 ASSESSMENT — PAIN DESCRIPTION - DESCRIPTORS: DESCRIPTORS: ACHING;DISCOMFORT;SHARP

## 2023-03-08 ASSESSMENT — PAIN - FUNCTIONAL ASSESSMENT: PAIN_FUNCTIONAL_ASSESSMENT: ACTIVITIES ARE NOT PREVENTED

## 2023-03-08 NOTE — PROGRESS NOTES
Urology Progress Note    42F with long standing history of a large left renal cyst, post drainage in the early , now admitted with left upper abdominal/flank pain     Subjective: Natali Pacheco feels a little better today     Vitals:  /72   Pulse 68   Temp 97.4 °F (36.3 °C) (Oral)   Resp 16   Ht 5' 6\" (1.676 m)   Wt 300 lb (136.1 kg)   SpO2 97%   BMI 48.42 kg/m²   Temp  Av.2 °F (36.8 °C)  Min: 97.4 °F (36.3 °C)  Max: 98.7 °F (37.1 °C)    Intake/Output Summary (Last 24 hours) at 3/7/2023 5902  Last data filed at 3/7/2023 0650  Gross per 24 hour   Intake 1323.43 ml   Output -1030 ml   Net 2353.43 ml       Exam:   Physical:  Well developed, well nourished in no acute distress  Mood indicates no abnormalities. Pt doesnt appear depressed  Orientated to time and place      Labs:  WBC:    Lab Results   Component Value Date/Time    WBC 3.8 2023 07:45 AM     Hemoglobin/Hematocrit:    Lab Results   Component Value Date/Time    HGB 7.5 2023 07:45 AM    HCT 23.4 2023 07:45 AM     BMP:    Lab Results   Component Value Date/Time     2023 07:45 AM    K 4.1 2023 07:45 AM    K 4.1 2023 05:24 AM     2023 07:45 AM    CO2 29 2023 07:45 AM    BUN 40 2023 07:45 AM    LABALBU 2.7 2023 05:24 AM    CREATININE 1.8 2023 07:45 AM    CALCIUM 8.4 2023 07:45 AM    GFRAA >60 2017 12:54 PM    GFRAA >60 2013 01:54 PM    LABGLOM 32 2023 07:45 AM     PT/INR:    Lab Results   Component Value Date/Time    PROTIME 17.4 2023 11:09 AM    INR 1.43 2023 11:09 AM     PTT:  No results found for: APTT[APTT    Urinalysis: NEGATIVE      Antibiotic Therapy:  Ceftriaxone     Imaging:   CT AP  1. 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   New stranding within adjacent mesenteric fat raises   possibility of superimposed inflammatory change/infection involving the   cystic lesion. 2. No radiographic finding to suggest presence of pancreatitis. 3. Nonobstructing right-sided intrarenal calculi. 4. Probable 1.8 cm involuting right ovarian cyst.     Impression/Plan:   Left renal versus suprarenal cyst with associated abdominal pain-the cyst wall has calcifications also  - Left renal cyst identified in early 2000s and drained at that point, has remained asymptomatic up until this admission.   Was last seen in our office in 2019 and IR drainage ordered at that time, but never completed 2/2 COVID 19 pandemic   - She continues to feel better with antibiotics and pain medication  - adrenal labs ordered this AM, ok for DC from urology standpoint when pain controlled and she can f/u in office with Dr Miguelangel Mata for planning of robotic left cyst decortication     Diane Bernal PA-C  [facility]

## 2023-03-09 ENCOUNTER — TELEPHONE (OUTPATIENT)
Dept: PRIMARY CARE CLINIC | Age: 43
End: 2023-03-09

## 2023-03-09 NOTE — TELEPHONE ENCOUNTER
Care Transitions Initial Follow Up Call    Outreach made within 2 business days of discharge: Yes    Patient: Jo Ann Sprague Patient : 1980   MRN: [id number]  Reason for Admission: abdominal pain   Discharge Date: 3/8/23       Spoke with: PT     Discharge department/facility: [facility] Patient Contact:  Was patient able to fill all prescriptions: Yes  Was patient instructed to bring all medications to the follow-up visit: Yes  Is patient taking all medications as directed in the discharge summary?  Yes  Does patient understand their discharge instructions: Yes  Does patient have questions or concerns that need addressed prior to 7-14 day follow up office visit: no    Scheduled appointment with PCP within 7-14 days    Follow Up  Future Appointments   Date Time Provider Sandra Vazquez   3/21/2023  9:30 AM Hansa Ryan DO [facility]       [place], [place]

## 2023-03-10 LAB — ALDOSTERONE: 3.6 NG/DL

## 2023-03-11 LAB — RENIN ACTIVITY: 0.8 NG/ML/HR

## 2023-03-20 NOTE — PROGRESS NOTES
Mood and Affect: Mood is anxious. Affect is tearful. Behavior: Behavior normal. Behavior is cooperative. Thought Content: Thought content includes suicidal ideation. Thought content does not include homicidal ideation. Thought content does not include homicidal or suicidal plan. Judgment: Judgment normal.       An electronic signature was used to authenticate this note.     Wesley Booker DO   PGY-2  [facility] Medicine Residency

## 2023-03-21 ENCOUNTER — OFFICE VISIT (OUTPATIENT)
Dept: PRIMARY CARE CLINIC | Age: 43
End: 2023-03-21

## 2023-03-21 VITALS
SYSTOLIC BLOOD PRESSURE: 128 MMHG | DIASTOLIC BLOOD PRESSURE: 88 MMHG | TEMPERATURE: 98.1 F | BODY MASS INDEX: 26.39 KG/M2 | HEIGHT: 65 IN | RESPIRATION RATE: 18 BRPM | HEART RATE: 102 BPM | WEIGHT: 158.4 LBS | OXYGEN SATURATION: 98 %

## 2023-03-21 DIAGNOSIS — F41.9 ANXIETY: ICD-10-CM

## 2023-03-21 DIAGNOSIS — Z09 HOSPITAL DISCHARGE FOLLOW-UP: Primary | ICD-10-CM

## 2023-03-21 RX ORDER — TRAZODONE HYDROCHLORIDE 50 MG/1
50 TABLET ORAL NIGHTLY PRN
Qty: 30 TABLET | Refills: 0 | Status: SHIPPED | OUTPATIENT
Start: 2023-03-21

## 2023-03-21 ASSESSMENT — ANXIETY QUESTIONNAIRES
4. TROUBLE RELAXING: 1
3. WORRYING TOO MUCH ABOUT DIFFERENT THINGS: 2
7. FEELING AFRAID AS IF SOMETHING AWFUL MIGHT HAPPEN: 2
1. FEELING NERVOUS, ANXIOUS, OR ON EDGE: 2
IF YOU CHECKED OFF ANY PROBLEMS ON THIS QUESTIONNAIRE, HOW DIFFICULT HAVE THESE PROBLEMS MADE IT FOR YOU TO DO YOUR WORK, TAKE CARE OF THINGS AT HOME, OR GET ALONG WITH OTHER PEOPLE: SOMEWHAT DIFFICULT
2. NOT BEING ABLE TO STOP OR CONTROL WORRYING: 2
5. BEING SO RESTLESS THAT IT IS HARD TO SIT STILL: 0
6. BECOMING EASILY ANNOYED OR IRRITABLE: 1
GAD7 TOTAL SCORE: 10

## 2023-03-21 ASSESSMENT — PATIENT HEALTH QUESTIONNAIRE - PHQ9
4. FEELING TIRED OR HAVING LITTLE ENERGY: 1
9. THOUGHTS THAT YOU WOULD BE BETTER OFF DEAD, OR OF HURTING YOURSELF: 1
6. FEELING BAD ABOUT YOURSELF - OR THAT YOU ARE A FAILURE OR HAVE LET YOURSELF OR YOUR FAMILY DOWN: 2
10. IF YOU CHECKED OFF ANY PROBLEMS, HOW DIFFICULT HAVE THESE PROBLEMS MADE IT FOR YOU TO DO YOUR WORK, TAKE CARE OF THINGS AT HOME, OR GET ALONG WITH OTHER PEOPLE: 1
SUM OF ALL RESPONSES TO PHQ QUESTIONS 1-9: 8
1. LITTLE INTEREST OR PLEASURE IN DOING THINGS: 1
SUM OF ALL RESPONSES TO PHQ9 QUESTIONS 1 & 2: 2
8. MOVING OR SPEAKING SO SLOWLY THAT OTHER PEOPLE COULD HAVE NOTICED. OR THE OPPOSITE, BEING SO FIGETY OR RESTLESS THAT YOU HAVE BEEN MOVING AROUND A LOT MORE THAN USUAL: 0
5. POOR APPETITE OR OVEREATING: 1
SUM OF ALL RESPONSES TO PHQ QUESTIONS 1-9: 9
SUM OF ALL RESPONSES TO PHQ QUESTIONS 1-9: 9
2. FEELING DOWN, DEPRESSED OR HOPELESS: 1
7. TROUBLE CONCENTRATING ON THINGS, SUCH AS READING THE NEWSPAPER OR WATCHING TELEVISION: 0
SUM OF ALL RESPONSES TO PHQ QUESTIONS 1-9: 9
3. TROUBLE FALLING OR STAYING ASLEEP: 2

## 2023-03-21 ASSESSMENT — ENCOUNTER SYMPTOMS
ABDOMINAL PAIN: 0
CONSTIPATION: 0
DIARRHEA: 0
COUGH: 0
SHORTNESS OF BREATH: 0
RHINORRHEA: 0

## 2023-03-21 ASSESSMENT — COLUMBIA-SUICIDE SEVERITY RATING SCALE - C-SSRS
1. WITHIN THE PAST MONTH, HAVE YOU WISHED YOU WERE DEAD OR WISHED YOU COULD GO TO SLEEP AND NOT WAKE UP?: YES
2. HAVE YOU ACTUALLY HAD ANY THOUGHTS OF KILLING YOURSELF?: NO
6. HAVE YOU EVER DONE ANYTHING, STARTED TO DO ANYTHING, OR PREPARED TO DO ANYTHING TO END YOUR LIFE?: NO

## 2023-03-21 NOTE — PATIENT INSTRUCTIONS
Dr. Diana Castellanos [facility])              Dr. Monica Diamond [facility])  [address]                                     [address] [facility], [facility] [address], [address] [facility]                                   [place], [address]   [phone]        Dr. Alethea Moody [facility])             Dr. Marcella Magaña [phone], [place]. [address] [facility], [facility] [address]                        [place], [address] [facility] [address]   [phone]    [facility]                          Dr. Marina Rosales [facility])  [address]              [address] [place] [address]. [facility], [address] [place]               [place], [address]  [phone]    Dr. Ashok Siu [facility])                               Dr. Lory Hodges [facility])  [address]  [facility].   [place], [address] [place]                        [place], [address]  [address] [facility]

## 2023-03-28 NOTE — PROGRESS NOTES
place, and time. She has normal strength. No cranial nerve deficit or sensory deficit. Coordination and gait normal.   Skin: Skin is warm and dry. No rash noted. No erythema. Psychiatric: She has a normal mood and affect. Her behavior is normal.     EKG Interpretation:  normal EKG, normal sinus rhythm, unchanged from previous tracings.     Lab Review   Hospital Outpatient Visit on 03/31/2023   Component Date Value    ABO/Rh 03/31/2023 A NEG     Antibody Screen 03/31/2023 NEG     WBC 03/31/2023 6.8     RBC 03/31/2023 3.77 (A)     Hemoglobin 03/31/2023 12.8     Hematocrit 03/31/2023 38.0     MCV 03/31/2023 100.8 (A)     MCH 03/31/2023 33.9     MCHC 03/31/2023 33.6     RDW 03/31/2023 13.1     Platelets 31/43/9986 190     MPV 03/31/2023 9.1     Neutrophils % 03/31/2023 63.5     Lymphocytes % 03/31/2023 26.8     Monocytes % 03/31/2023 7.6     Eosinophils % 03/31/2023 1.5     Basophils % 03/31/2023 0.6     Neutrophils Absolute 03/31/2023 4.3     Lymphocytes Absolute 03/31/2023 1.8     Monocytes Absolute 03/31/2023 0.5     Eosinophils Absolute 03/31/2023 0.1     Basophils Absolute 03/31/2023 0.0     Color, UA 03/31/2023 Yellow     Clarity, UA 03/31/2023 Clear     Glucose, Ur 03/31/2023 Negative     Bilirubin Urine 03/31/2023 Negative     Ketones, Urine 03/31/2023 15 (A)     Specific [place], UA 03/31/2023 1.025     Blood, Urine 03/31/2023 Negative     pH, UA 03/31/2023 6.0     Protein, UA 03/31/2023 Negative     Urobilinogen, Urine 03/31/2023 0.2     Nitrite, Urine 03/31/2023 Negative     Leukocyte Esterase, Urine 03/31/2023 Negative     Microscopic Examination 03/31/2023 Not Indicated     Urine Type 03/31/2023 NotGiven     Ventricular Rate 03/31/2023 89     Atrial Rate 03/31/2023 89     P-R Interval 03/31/2023 130     QRS Duration 03/31/2023 78     Q-T Interval 03/31/2023 366     QTc Calculation ([place]) 03/31/2023 445     P Axis 03/31/2023 32     R [place] 03/31/2023 -9     T [place] 03/31/2023 23     Diagnosis 03/31/2023

## 2023-03-31 ENCOUNTER — ANESTHESIA EVENT (OUTPATIENT)
Dept: OPERATING ROOM | Age: 43
End: 2023-03-31
Payer: COMMERCIAL

## 2023-03-31 ENCOUNTER — HOSPITAL ENCOUNTER (OUTPATIENT)
Dept: PREADMISSION TESTING | Age: 43
End: 2023-03-31
Payer: COMMERCIAL

## 2023-03-31 ENCOUNTER — OFFICE VISIT (OUTPATIENT)
Dept: PRIMARY CARE CLINIC | Age: 43
End: 2023-03-31

## 2023-03-31 VITALS
BODY MASS INDEX: 26.63 KG/M2 | WEIGHT: 160 LBS | HEART RATE: 114 BPM | OXYGEN SATURATION: 98 % | SYSTOLIC BLOOD PRESSURE: 130 MMHG | RESPIRATION RATE: 18 BRPM | TEMPERATURE: 97.4 F | DIASTOLIC BLOOD PRESSURE: 88 MMHG

## 2023-03-31 DIAGNOSIS — F32.A ANXIETY AND DEPRESSION: ICD-10-CM

## 2023-03-31 DIAGNOSIS — F10.10 ALCOHOL ABUSE: ICD-10-CM

## 2023-03-31 DIAGNOSIS — Z01.818 PREOP EXAMINATION: Primary | ICD-10-CM

## 2023-03-31 DIAGNOSIS — F41.9 ANXIETY AND DEPRESSION: ICD-10-CM

## 2023-03-31 DIAGNOSIS — N28.1 RENAL CYST: ICD-10-CM

## 2023-03-31 LAB
ABO + RH BLD: NORMAL
ANION GAP SERPL CALCULATED.3IONS-SCNC: 14 MMOL/L (ref 3–16)
BASOPHILS # BLD: 0 K/UL (ref 0–0.2)
BASOPHILS NFR BLD: 0.6 %
BILIRUB UR QL STRIP.AUTO: NEGATIVE
BLD GP AB SCN SERPL QL: NORMAL
BUN SERPL-MCNC: 13 MG/DL (ref 7–20)
CALCIUM SERPL-MCNC: 9.3 MG/DL (ref 8.3–10.6)
CHLORIDE SERPL-SCNC: 98 MMOL/L (ref 99–110)
CLARITY UR: CLEAR
CO2 SERPL-SCNC: 23 MMOL/L (ref 21–32)
COLOR UR: YELLOW
CREAT SERPL-MCNC: 0.6 MG/DL (ref 0.6–1.1)
DEPRECATED RDW RBC AUTO: 13.1 % (ref 12.4–15.4)
EKG ATRIAL RATE: 89 BPM
EKG DIAGNOSIS: NORMAL
EKG P AXIS: 32 DEGREES
EKG P-R INTERVAL: 130 MS
EKG Q-T INTERVAL: 366 MS
EKG QRS DURATION: 78 MS
EKG QTC CALCULATION (BAZETT): 445 MS
EKG R AXIS: -9 DEGREES
EKG T AXIS: 23 DEGREES
EKG VENTRICULAR RATE: 89 BPM
EOSINOPHIL # BLD: 0.1 K/UL (ref 0–0.6)
EOSINOPHIL NFR BLD: 1.5 %
GFR SERPLBLD CREATININE-BSD FMLA CKD-EPI: >60 ML/MIN/{1.73_M2}
GLUCOSE SERPL-MCNC: 85 MG/DL (ref 70–99)
GLUCOSE UR STRIP.AUTO-MCNC: NEGATIVE MG/DL
HCT VFR BLD AUTO: 38 % (ref 36–48)
HGB BLD-MCNC: 12.8 G/DL (ref 12–16)
HGB UR QL STRIP.AUTO: NEGATIVE
KETONES UR STRIP.AUTO-MCNC: 15 MG/DL
LEUKOCYTE ESTERASE UR QL STRIP.AUTO: NEGATIVE
LYMPHOCYTES # BLD: 1.8 K/UL (ref 1–5.1)
LYMPHOCYTES NFR BLD: 26.8 %
MCH RBC QN AUTO: 33.9 PG (ref 26–34)
MCHC RBC AUTO-ENTMCNC: 33.6 G/DL (ref 31–36)
MCV RBC AUTO: 100.8 FL (ref 80–100)
MONOCYTES # BLD: 0.5 K/UL (ref 0–1.3)
MONOCYTES NFR BLD: 7.6 %
NEUTROPHILS # BLD: 4.3 K/UL (ref 1.7–7.7)
NEUTROPHILS NFR BLD: 63.5 %
NITRITE UR QL STRIP.AUTO: NEGATIVE
PH UR STRIP.AUTO: 6 [PH] (ref 5–8)
PLATELET # BLD AUTO: 190 K/UL (ref 135–450)
PMV BLD AUTO: 9.1 FL (ref 5–10.5)
POTASSIUM SERPL-SCNC: 4 MMOL/L (ref 3.5–5.1)
PROT UR STRIP.AUTO-MCNC: NEGATIVE MG/DL
RBC # BLD AUTO: 3.77 M/UL (ref 4–5.2)
SODIUM SERPL-SCNC: 135 MMOL/L (ref 136–145)
SP GR UR STRIP.AUTO: 1.02 (ref 1–1.03)
UA DIPSTICK W REFLEX MICRO PNL UR: ABNORMAL
URN SPEC COLLECT METH UR: ABNORMAL
UROBILINOGEN UR STRIP-ACNC: 0.2 E.U./DL
WBC # BLD AUTO: 6.8 K/UL (ref 4–11)

## 2023-03-31 PROCEDURE — 93010 ELECTROCARDIOGRAM REPORT: CPT | Performed by: INTERNAL MEDICINE

## 2023-03-31 PROCEDURE — 81003 URINALYSIS AUTO W/O SCOPE: CPT

## 2023-03-31 PROCEDURE — 93005 ELECTROCARDIOGRAM TRACING: CPT | Performed by: UROLOGY

## 2023-03-31 PROCEDURE — 80048 BASIC METABOLIC PNL TOTAL CA: CPT

## 2023-03-31 PROCEDURE — 86901 BLOOD TYPING SEROLOGIC RH(D): CPT

## 2023-03-31 PROCEDURE — 87086 URINE CULTURE/COLONY COUNT: CPT

## 2023-03-31 PROCEDURE — 36415 COLL VENOUS BLD VENIPUNCTURE: CPT

## 2023-03-31 PROCEDURE — 86900 BLOOD TYPING SEROLOGIC ABO: CPT

## 2023-03-31 PROCEDURE — 86850 RBC ANTIBODY SCREEN: CPT

## 2023-03-31 PROCEDURE — 85025 COMPLETE CBC W/AUTO DIFF WBC: CPT

## 2023-03-31 RX ORDER — DESVENLAFAXINE SUCCINATE 50 MG/1
50 TABLET, EXTENDED RELEASE ORAL DAILY
COMMUNITY
Start: 2023-03-30

## 2023-03-31 SDOH — ECONOMIC STABILITY: HOUSING INSECURITY
IN THE LAST 12 MONTHS, WAS THERE A TIME WHEN YOU DID NOT HAVE A STEADY PLACE TO SLEEP OR SLEPT IN A SHELTER (INCLUDING NOW)?: NO

## 2023-03-31 SDOH — ECONOMIC STABILITY: FOOD INSECURITY: WITHIN THE PAST 12 MONTHS, YOU WORRIED THAT YOUR FOOD WOULD RUN OUT BEFORE YOU GOT MONEY TO BUY MORE.: NEVER TRUE

## 2023-03-31 SDOH — ECONOMIC STABILITY: INCOME INSECURITY: HOW HARD IS IT FOR YOU TO PAY FOR THE VERY BASICS LIKE FOOD, HOUSING, MEDICAL CARE, AND HEATING?: HARD

## 2023-03-31 SDOH — ECONOMIC STABILITY: FOOD INSECURITY: WITHIN THE PAST 12 MONTHS, THE FOOD YOU BOUGHT JUST DIDN'T LAST AND YOU DIDN'T HAVE MONEY TO GET MORE.: NEVER TRUE

## 2023-03-31 ASSESSMENT — PATIENT HEALTH QUESTIONNAIRE - PHQ9
5. POOR APPETITE OR OVEREATING: 2
7. TROUBLE CONCENTRATING ON THINGS, SUCH AS READING THE NEWSPAPER OR WATCHING TELEVISION: 0
SUM OF ALL RESPONSES TO PHQ QUESTIONS 1-9: 9
SUM OF ALL RESPONSES TO PHQ9 QUESTIONS 1 & 2: 3
6. FEELING BAD ABOUT YOURSELF - OR THAT YOU ARE A FAILURE OR HAVE LET YOURSELF OR YOUR FAMILY DOWN: 2
4. FEELING TIRED OR HAVING LITTLE ENERGY: 1
SUM OF ALL RESPONSES TO PHQ QUESTIONS 1-9: 10
3. TROUBLE FALLING OR STAYING ASLEEP: 1
10. IF YOU CHECKED OFF ANY PROBLEMS, HOW DIFFICULT HAVE THESE PROBLEMS MADE IT FOR YOU TO DO YOUR WORK, TAKE CARE OF THINGS AT HOME, OR GET ALONG WITH OTHER PEOPLE: 1
1. LITTLE INTEREST OR PLEASURE IN DOING THINGS: 1
SUM OF ALL RESPONSES TO PHQ QUESTIONS 1-9: 10
2. FEELING DOWN, DEPRESSED OR HOPELESS: 2
8. MOVING OR SPEAKING SO SLOWLY THAT OTHER PEOPLE COULD HAVE NOTICED. OR THE OPPOSITE, BEING SO FIGETY OR RESTLESS THAT YOU HAVE BEEN MOVING AROUND A LOT MORE THAN USUAL: 0
9. THOUGHTS THAT YOU WOULD BE BETTER OFF DEAD, OR OF HURTING YOURSELF: 1
SUM OF ALL RESPONSES TO PHQ QUESTIONS 1-9: 10

## 2023-03-31 ASSESSMENT — ANXIETY QUESTIONNAIRES
7. FEELING AFRAID AS IF SOMETHING AWFUL MIGHT HAPPEN: 2
1. FEELING NERVOUS, ANXIOUS, OR ON EDGE: 2
5. BEING SO RESTLESS THAT IT IS HARD TO SIT STILL: 1
GAD7 TOTAL SCORE: 12
4. TROUBLE RELAXING: 2
6. BECOMING EASILY ANNOYED OR IRRITABLE: 1
IF YOU CHECKED OFF ANY PROBLEMS ON THIS QUESTIONNAIRE, HOW DIFFICULT HAVE THESE PROBLEMS MADE IT FOR YOU TO DO YOUR WORK, TAKE CARE OF THINGS AT HOME, OR GET ALONG WITH OTHER PEOPLE: VERY DIFFICULT
2. NOT BEING ABLE TO STOP OR CONTROL WORRYING: 2
3. WORRYING TOO MUCH ABOUT DIFFERENT THINGS: 2

## 2023-03-31 ASSESSMENT — COLUMBIA-SUICIDE SEVERITY RATING SCALE - C-SSRS
2. HAVE YOU ACTUALLY HAD ANY THOUGHTS OF KILLING YOURSELF?: NO
6. HAVE YOU EVER DONE ANYTHING, STARTED TO DO ANYTHING, OR PREPARED TO DO ANYTHING TO END YOUR LIFE?: NO
1. WITHIN THE PAST MONTH, HAVE YOU WISHED YOU WERE DEAD OR WISHED YOU COULD GO TO SLEEP AND NOT WAKE UP?: YES

## 2023-04-01 LAB — BACTERIA UR CULT: NORMAL

## 2023-04-04 ENCOUNTER — ANESTHESIA (OUTPATIENT)
Dept: OPERATING ROOM | Age: 43
End: 2023-04-04
Payer: COMMERCIAL

## 2023-04-04 ENCOUNTER — HOSPITAL ENCOUNTER (INPATIENT)
Age: 43
LOS: 2 days | Discharge: HOME | End: 2023-04-06
Attending: UROLOGY | Admitting: UROLOGY
Payer: COMMERCIAL

## 2023-04-04 DIAGNOSIS — N28.1 ACQUIRED RENAL CYST OF LEFT KIDNEY: ICD-10-CM

## 2023-04-04 DIAGNOSIS — Z98.890 S/P ROBOT-ASSISTED SURGICAL PROCEDURE: Primary | ICD-10-CM

## 2023-04-04 DIAGNOSIS — N28.1 ACQUIRED CYST OF KIDNEY: ICD-10-CM

## 2023-04-04 LAB
ABO + RH BLD: NORMAL
ANION GAP SERPL CALCULATED.3IONS-SCNC: 13 MMOL/L (ref 3–16)
BLD GP AB SCN SERPL QL: NORMAL
BUN SERPL-MCNC: 8 MG/DL (ref 7–20)
CALCIUM SERPL-MCNC: 8.5 MG/DL (ref 8.3–10.6)
CHLORIDE SERPL-SCNC: 100 MMOL/L (ref 99–110)
CO2 SERPL-SCNC: 21 MMOL/L (ref 21–32)
CREAT SERPL-MCNC: 0.7 MG/DL (ref 0.6–1.1)
DEPRECATED RDW RBC AUTO: 13.3 % (ref 12.4–15.4)
GFR SERPLBLD CREATININE-BSD FMLA CKD-EPI: >60 ML/MIN/{1.73_M2}
GLUCOSE SERPL-MCNC: 156 MG/DL (ref 70–99)
HCT VFR BLD AUTO: 37 % (ref 36–48)
HGB BLD-MCNC: 12.5 G/DL (ref 12–16)
MCH RBC QN AUTO: 34 PG (ref 26–34)
MCHC RBC AUTO-ENTMCNC: 33.9 G/DL (ref 31–36)
MCV RBC AUTO: 100.3 FL (ref 80–100)
PLATELET # BLD AUTO: 207 K/UL (ref 135–450)
PMV BLD AUTO: 7.7 FL (ref 5–10.5)
POTASSIUM SERPL-SCNC: 4.4 MMOL/L (ref 3.5–5.1)
RBC # BLD AUTO: 3.69 M/UL (ref 4–5.2)
SODIUM SERPL-SCNC: 134 MMOL/L (ref 136–145)
WBC # BLD AUTO: 10.2 K/UL (ref 4–11)

## 2023-04-04 PROCEDURE — 2709999900 HC NON-CHARGEABLE SUPPLY: Performed by: UROLOGY

## 2023-04-04 PROCEDURE — 86850 RBC ANTIBODY SCREEN: CPT

## 2023-04-04 PROCEDURE — 7100000000 HC PACU RECOVERY - FIRST 15 MIN: Performed by: UROLOGY

## 2023-04-04 PROCEDURE — 2500000003 HC RX 250 WO HCPCS: Performed by: UROLOGY

## 2023-04-04 PROCEDURE — 3700000001 HC ADD 15 MINUTES (ANESTHESIA): Performed by: UROLOGY

## 2023-04-04 PROCEDURE — 2580000003 HC RX 258: Performed by: UROLOGY

## 2023-04-04 PROCEDURE — 2500000003 HC RX 250 WO HCPCS: Performed by: NURSE ANESTHETIST, CERTIFIED REGISTERED

## 2023-04-04 PROCEDURE — 6360000002 HC RX W HCPCS: Performed by: ANESTHESIOLOGY

## 2023-04-04 PROCEDURE — 6360000002 HC RX W HCPCS: Performed by: NURSE ANESTHETIST, CERTIFIED REGISTERED

## 2023-04-04 PROCEDURE — 2580000003 HC RX 258: Performed by: NURSE ANESTHETIST, CERTIFIED REGISTERED

## 2023-04-04 PROCEDURE — 88305 TISSUE EXAM BY PATHOLOGIST: CPT

## 2023-04-04 PROCEDURE — 36415 COLL VENOUS BLD VENIPUNCTURE: CPT

## 2023-04-04 PROCEDURE — 6370000000 HC RX 637 (ALT 250 FOR IP): Performed by: ANESTHESIOLOGY

## 2023-04-04 PROCEDURE — 3700000000 HC ANESTHESIA ATTENDED CARE: Performed by: UROLOGY

## 2023-04-04 PROCEDURE — 1200000000 HC SEMI PRIVATE

## 2023-04-04 PROCEDURE — 3600000019 HC SURGERY ROBOT ADDTL 15MIN: Performed by: UROLOGY

## 2023-04-04 PROCEDURE — 6360000002 HC RX W HCPCS: Performed by: UROLOGY

## 2023-04-04 PROCEDURE — 7100000001 HC PACU RECOVERY - ADDTL 15 MIN: Performed by: UROLOGY

## 2023-04-04 PROCEDURE — 6370000000 HC RX 637 (ALT 250 FOR IP): Performed by: UROLOGY

## 2023-04-04 PROCEDURE — 6360000002 HC RX W HCPCS

## 2023-04-04 PROCEDURE — 3600000009 HC SURGERY ROBOT BASE: Performed by: UROLOGY

## 2023-04-04 PROCEDURE — 2580000003 HC RX 258: Performed by: ANESTHESIOLOGY

## 2023-04-04 PROCEDURE — A4217 STERILE WATER/SALINE, 500 ML: HCPCS | Performed by: UROLOGY

## 2023-04-04 PROCEDURE — 88311 DECALCIFY TISSUE: CPT

## 2023-04-04 PROCEDURE — 86901 BLOOD TYPING SEROLOGIC RH(D): CPT

## 2023-04-04 PROCEDURE — 80048 BASIC METABOLIC PNL TOTAL CA: CPT

## 2023-04-04 PROCEDURE — 85027 COMPLETE CBC AUTOMATED: CPT

## 2023-04-04 PROCEDURE — C1889 IMPLANT/INSERT DEVICE, NOC: HCPCS | Performed by: UROLOGY

## 2023-04-04 PROCEDURE — S2900 ROBOTIC SURGICAL SYSTEM: HCPCS | Performed by: UROLOGY

## 2023-04-04 PROCEDURE — 86900 BLOOD TYPING SEROLOGIC ABO: CPT

## 2023-04-04 PROCEDURE — 2500000003 HC RX 250 WO HCPCS: Performed by: ANESTHESIOLOGY

## 2023-04-04 DEVICE — CLIP INT M L POLYMER LOK LIG HEM O LOK: Type: IMPLANTABLE DEVICE | Site: RENAL | Status: FUNCTIONAL

## 2023-04-04 RX ORDER — SODIUM CHLORIDE 0.9 % (FLUSH) 0.9 %
5-40 SYRINGE (ML) INJECTION EVERY 12 HOURS SCHEDULED
Status: DISCONTINUED | OUTPATIENT
Start: 2023-04-04 | End: 2023-04-04 | Stop reason: HOSPADM

## 2023-04-04 RX ORDER — LEVOCETIRIZINE DIHYDROCHLORIDE 5 MG/1
10 TABLET, FILM COATED ORAL NIGHTLY
Status: DISCONTINUED | OUTPATIENT
Start: 2023-04-05 | End: 2023-04-06 | Stop reason: HOSPADM

## 2023-04-04 RX ORDER — LORAZEPAM 2 MG/ML
4 INJECTION INTRAMUSCULAR ONCE
Status: COMPLETED | OUTPATIENT
Start: 2023-04-04 | End: 2023-04-04

## 2023-04-04 RX ORDER — ONDANSETRON 2 MG/ML
INJECTION INTRAMUSCULAR; INTRAVENOUS PRN
Status: DISCONTINUED | OUTPATIENT
Start: 2023-04-04 | End: 2023-04-04 | Stop reason: SDUPTHER

## 2023-04-04 RX ORDER — SODIUM CHLORIDE 9 MG/ML
INJECTION, SOLUTION INTRAVENOUS PRN
Status: DISCONTINUED | OUTPATIENT
Start: 2023-04-04 | End: 2023-04-04 | Stop reason: HOSPADM

## 2023-04-04 RX ORDER — SODIUM CHLORIDE 0.9 % (FLUSH) 0.9 %
5-40 SYRINGE (ML) INJECTION PRN
Status: DISCONTINUED | OUTPATIENT
Start: 2023-04-04 | End: 2023-04-04 | Stop reason: HOSPADM

## 2023-04-04 RX ORDER — POLYETHYLENE GLYCOL 3350 17 G/17G
17 POWDER, FOR SOLUTION ORAL DAILY PRN
Status: DISCONTINUED | OUTPATIENT
Start: 2023-04-04 | End: 2023-04-05

## 2023-04-04 RX ORDER — MIDAZOLAM HYDROCHLORIDE 1 MG/ML
2 INJECTION INTRAMUSCULAR; INTRAVENOUS
Status: DISCONTINUED | OUTPATIENT
Start: 2023-04-04 | End: 2023-04-04 | Stop reason: HOSPADM

## 2023-04-04 RX ORDER — CLINDAMYCIN PHOSPHATE 600 MG/50ML
600 INJECTION INTRAVENOUS
Status: COMPLETED | OUTPATIENT
Start: 2023-04-04 | End: 2023-04-04

## 2023-04-04 RX ORDER — ONDANSETRON 4 MG/1
4 TABLET, ORALLY DISINTEGRATING ORAL EVERY 8 HOURS PRN
Status: DISCONTINUED | OUTPATIENT
Start: 2023-04-04 | End: 2023-04-06 | Stop reason: HOSPADM

## 2023-04-04 RX ORDER — NALOXONE HYDROCHLORIDE 0.4 MG/ML
INJECTION, SOLUTION INTRAMUSCULAR; INTRAVENOUS; SUBCUTANEOUS PRN
Status: DISCONTINUED | OUTPATIENT
Start: 2023-04-04 | End: 2023-04-05

## 2023-04-04 RX ORDER — ONDANSETRON 2 MG/ML
4 INJECTION INTRAMUSCULAR; INTRAVENOUS EVERY 6 HOURS PRN
Status: DISCONTINUED | OUTPATIENT
Start: 2023-04-04 | End: 2023-04-06 | Stop reason: HOSPADM

## 2023-04-04 RX ORDER — SODIUM CHLORIDE 0.9 % (FLUSH) 0.9 %
5-40 SYRINGE (ML) INJECTION PRN
Status: DISCONTINUED | OUTPATIENT
Start: 2023-04-04 | End: 2023-04-06 | Stop reason: HOSPADM

## 2023-04-04 RX ORDER — PROPOFOL 10 MG/ML
INJECTION, EMULSION INTRAVENOUS PRN
Status: DISCONTINUED | OUTPATIENT
Start: 2023-04-04 | End: 2023-04-04 | Stop reason: SDUPTHER

## 2023-04-04 RX ORDER — OXYCODONE HYDROCHLORIDE 5 MG/1
10 TABLET ORAL PRN
Status: COMPLETED | OUTPATIENT
Start: 2023-04-04 | End: 2023-04-04

## 2023-04-04 RX ORDER — DIPHENHYDRAMINE HYDROCHLORIDE 50 MG/ML
12.5 INJECTION INTRAMUSCULAR; INTRAVENOUS
Status: DISCONTINUED | OUTPATIENT
Start: 2023-04-04 | End: 2023-04-04 | Stop reason: HOSPADM

## 2023-04-04 RX ORDER — MEPERIDINE HYDROCHLORIDE 50 MG/ML
12.5 INJECTION INTRAMUSCULAR; INTRAVENOUS; SUBCUTANEOUS EVERY 5 MIN PRN
Status: DISCONTINUED | OUTPATIENT
Start: 2023-04-04 | End: 2023-04-04 | Stop reason: HOSPADM

## 2023-04-04 RX ORDER — OXYCODONE HYDROCHLORIDE 5 MG/1
5 TABLET ORAL EVERY 4 HOURS PRN
Status: DISCONTINUED | OUTPATIENT
Start: 2023-04-04 | End: 2023-04-06 | Stop reason: HOSPADM

## 2023-04-04 RX ORDER — LIDOCAINE HYDROCHLORIDE 20 MG/ML
INJECTION, SOLUTION INFILTRATION; PERINEURAL PRN
Status: DISCONTINUED | OUTPATIENT
Start: 2023-04-04 | End: 2023-04-04 | Stop reason: SDUPTHER

## 2023-04-04 RX ORDER — MIDAZOLAM HYDROCHLORIDE 1 MG/ML
INJECTION INTRAMUSCULAR; INTRAVENOUS PRN
Status: DISCONTINUED | OUTPATIENT
Start: 2023-04-04 | End: 2023-04-04 | Stop reason: SDUPTHER

## 2023-04-04 RX ORDER — SODIUM CHLORIDE, SODIUM LACTATE, POTASSIUM CHLORIDE, CALCIUM CHLORIDE 600; 310; 30; 20 MG/100ML; MG/100ML; MG/100ML; MG/100ML
INJECTION, SOLUTION INTRAVENOUS CONTINUOUS PRN
Status: DISCONTINUED | OUTPATIENT
Start: 2023-04-04 | End: 2023-04-04 | Stop reason: SDUPTHER

## 2023-04-04 RX ORDER — DIPHENHYDRAMINE HYDROCHLORIDE 50 MG/ML
25 INJECTION INTRAMUSCULAR; INTRAVENOUS EVERY 6 HOURS PRN
Status: DISCONTINUED | OUTPATIENT
Start: 2023-04-04 | End: 2023-04-05

## 2023-04-04 RX ORDER — MORPHINE SULFATE 4 MG/ML
4 INJECTION, SOLUTION INTRAMUSCULAR; INTRAVENOUS EVERY 10 MIN PRN
Status: COMPLETED | OUTPATIENT
Start: 2023-04-04 | End: 2023-04-04

## 2023-04-04 RX ORDER — MORPHINE SULFATE 4 MG/ML
4 INJECTION, SOLUTION INTRAMUSCULAR; INTRAVENOUS ONCE
Status: DISCONTINUED | OUTPATIENT
Start: 2023-04-04 | End: 2023-04-04 | Stop reason: HOSPADM

## 2023-04-04 RX ORDER — KETAMINE HCL IN NACL, ISO-OSM 100MG/10ML
SYRINGE (ML) INJECTION PRN
Status: DISCONTINUED | OUTPATIENT
Start: 2023-04-04 | End: 2023-04-04 | Stop reason: SDUPTHER

## 2023-04-04 RX ORDER — ROCURONIUM BROMIDE 10 MG/ML
INJECTION, SOLUTION INTRAVENOUS PRN
Status: DISCONTINUED | OUTPATIENT
Start: 2023-04-04 | End: 2023-04-04 | Stop reason: SDUPTHER

## 2023-04-04 RX ORDER — IBUPROFEN 600 MG/1
600 TABLET ORAL EVERY 8 HOURS
Status: DISCONTINUED | OUTPATIENT
Start: 2023-04-04 | End: 2023-04-05

## 2023-04-04 RX ORDER — MAGNESIUM HYDROXIDE 1200 MG/15ML
LIQUID ORAL CONTINUOUS PRN
Status: COMPLETED | OUTPATIENT
Start: 2023-04-04 | End: 2023-04-04

## 2023-04-04 RX ORDER — LABETALOL HYDROCHLORIDE 5 MG/ML
5 INJECTION, SOLUTION INTRAVENOUS EVERY 10 MIN PRN
Status: DISCONTINUED | OUTPATIENT
Start: 2023-04-04 | End: 2023-04-04 | Stop reason: HOSPADM

## 2023-04-04 RX ORDER — OXYCODONE HYDROCHLORIDE 5 MG/1
5 TABLET ORAL PRN
Status: COMPLETED | OUTPATIENT
Start: 2023-04-04 | End: 2023-04-04

## 2023-04-04 RX ORDER — BUPIVACAINE HYDROCHLORIDE 5 MG/ML
INJECTION, SOLUTION EPIDURAL; INTRACAUDAL PRN
Status: DISCONTINUED | OUTPATIENT
Start: 2023-04-04 | End: 2023-04-04 | Stop reason: ALTCHOICE

## 2023-04-04 RX ORDER — SODIUM CHLORIDE 9 MG/ML
INJECTION, SOLUTION INTRAVENOUS PRN
Status: DISCONTINUED | OUTPATIENT
Start: 2023-04-04 | End: 2023-04-06 | Stop reason: HOSPADM

## 2023-04-04 RX ORDER — ENOXAPARIN SODIUM 100 MG/ML
40 INJECTION SUBCUTANEOUS DAILY
Status: DISCONTINUED | OUTPATIENT
Start: 2023-04-05 | End: 2023-04-06 | Stop reason: HOSPADM

## 2023-04-04 RX ORDER — SENNA PLUS 8.6 MG/1
1 TABLET ORAL DAILY PRN
Status: DISCONTINUED | OUTPATIENT
Start: 2023-04-04 | End: 2023-04-06 | Stop reason: HOSPADM

## 2023-04-04 RX ORDER — MAGNESIUM SULFATE IN WATER 40 MG/ML
2000 INJECTION, SOLUTION INTRAVENOUS PRN
Status: DISCONTINUED | OUTPATIENT
Start: 2023-04-04 | End: 2023-04-06 | Stop reason: HOSPADM

## 2023-04-04 RX ORDER — ACETAMINOPHEN 325 MG/1
650 TABLET ORAL EVERY 4 HOURS PRN
Status: DISCONTINUED | OUTPATIENT
Start: 2023-04-04 | End: 2023-04-05

## 2023-04-04 RX ORDER — LIDOCAINE HYDROCHLORIDE 10 MG/ML
1 INJECTION, SOLUTION EPIDURAL; INFILTRATION; INTRACAUDAL; PERINEURAL
Status: DISCONTINUED | OUTPATIENT
Start: 2023-04-04 | End: 2023-04-04 | Stop reason: HOSPADM

## 2023-04-04 RX ORDER — SODIUM CHLORIDE 0.9 % (FLUSH) 0.9 %
5-40 SYRINGE (ML) INJECTION EVERY 12 HOURS SCHEDULED
Status: DISCONTINUED | OUTPATIENT
Start: 2023-04-04 | End: 2023-04-06 | Stop reason: HOSPADM

## 2023-04-04 RX ORDER — MORPHINE SULFATE 2 MG/ML
INJECTION, SOLUTION INTRAMUSCULAR; INTRAVENOUS
Status: COMPLETED
Start: 2023-04-04 | End: 2023-04-04

## 2023-04-04 RX ORDER — LORAZEPAM 2 MG/ML
4 INJECTION INTRAMUSCULAR EVERY 6 HOURS PRN
Status: DISCONTINUED | OUTPATIENT
Start: 2023-04-04 | End: 2023-04-06 | Stop reason: HOSPADM

## 2023-04-04 RX ORDER — OXYCODONE HYDROCHLORIDE 5 MG/1
10 TABLET ORAL EVERY 4 HOURS PRN
Status: DISCONTINUED | OUTPATIENT
Start: 2023-04-04 | End: 2023-04-06 | Stop reason: HOSPADM

## 2023-04-04 RX ORDER — FENTANYL CITRATE 50 UG/ML
INJECTION, SOLUTION INTRAMUSCULAR; INTRAVENOUS PRN
Status: DISCONTINUED | OUTPATIENT
Start: 2023-04-04 | End: 2023-04-04 | Stop reason: SDUPTHER

## 2023-04-04 RX ORDER — DESVENLAFAXINE SUCCINATE 50 MG/1
50 TABLET, EXTENDED RELEASE ORAL DAILY
Status: DISCONTINUED | OUTPATIENT
Start: 2023-04-04 | End: 2023-04-06 | Stop reason: HOSPADM

## 2023-04-04 RX ORDER — CETIRIZINE HYDROCHLORIDE 10 MG/1
10 TABLET ORAL DAILY
Status: DISCONTINUED | OUTPATIENT
Start: 2023-04-04 | End: 2023-04-04

## 2023-04-04 RX ORDER — ONDANSETRON 2 MG/ML
INJECTION INTRAMUSCULAR; INTRAVENOUS PRN
Status: DISCONTINUED | OUTPATIENT
Start: 2023-04-04 | End: 2023-04-04

## 2023-04-04 RX ORDER — MIDAZOLAM HYDROCHLORIDE 1 MG/ML
INJECTION INTRAMUSCULAR; INTRAVENOUS
Status: DISPENSED
Start: 2023-04-04 | End: 2023-04-04

## 2023-04-04 RX ORDER — POTASSIUM CHLORIDE 7.45 MG/ML
10 INJECTION INTRAVENOUS PRN
Status: DISCONTINUED | OUTPATIENT
Start: 2023-04-04 | End: 2023-04-06 | Stop reason: HOSPADM

## 2023-04-04 RX ORDER — FAMOTIDINE 10 MG/ML
20 INJECTION, SOLUTION INTRAVENOUS ONCE
Status: COMPLETED | OUTPATIENT
Start: 2023-04-04 | End: 2023-04-04

## 2023-04-04 RX ORDER — DEXAMETHASONE SODIUM PHOSPHATE 4 MG/ML
INJECTION, SOLUTION INTRA-ARTICULAR; INTRALESIONAL; INTRAMUSCULAR; INTRAVENOUS; SOFT TISSUE PRN
Status: DISCONTINUED | OUTPATIENT
Start: 2023-04-04 | End: 2023-04-04 | Stop reason: SDUPTHER

## 2023-04-04 RX ORDER — MIDAZOLAM HYDROCHLORIDE 1 MG/ML
1 INJECTION INTRAMUSCULAR; INTRAVENOUS ONCE
Status: COMPLETED | OUTPATIENT
Start: 2023-04-04 | End: 2023-04-04

## 2023-04-04 RX ORDER — CEFAZOLIN SODIUM IN 0.9 % NACL 2 G/100 ML
2000 PLASTIC BAG, INJECTION (ML) INTRAVENOUS EVERY 8 HOURS
Status: COMPLETED | OUTPATIENT
Start: 2023-04-04 | End: 2023-04-05

## 2023-04-04 RX ORDER — ONDANSETRON 2 MG/ML
4 INJECTION INTRAMUSCULAR; INTRAVENOUS
Status: DISCONTINUED | OUTPATIENT
Start: 2023-04-04 | End: 2023-04-04 | Stop reason: HOSPADM

## 2023-04-04 RX ORDER — SODIUM CHLORIDE, SODIUM LACTATE, POTASSIUM CHLORIDE, CALCIUM CHLORIDE 600; 310; 30; 20 MG/100ML; MG/100ML; MG/100ML; MG/100ML
INJECTION, SOLUTION INTRAVENOUS CONTINUOUS
Status: DISCONTINUED | OUTPATIENT
Start: 2023-04-04 | End: 2023-04-04 | Stop reason: HOSPADM

## 2023-04-04 RX ORDER — LIDOCAINE 4 G/G
1 PATCH TOPICAL EVERY 12 HOURS
Status: DISCONTINUED | OUTPATIENT
Start: 2023-04-04 | End: 2023-04-06 | Stop reason: HOSPADM

## 2023-04-04 RX ORDER — KETOROLAC TROMETHAMINE 30 MG/ML
30 INJECTION, SOLUTION INTRAMUSCULAR; INTRAVENOUS ONCE
Status: COMPLETED | OUTPATIENT
Start: 2023-04-04 | End: 2023-04-04

## 2023-04-04 RX ORDER — POTASSIUM CHLORIDE 20 MEQ/1
40 TABLET, EXTENDED RELEASE ORAL PRN
Status: DISCONTINUED | OUTPATIENT
Start: 2023-04-04 | End: 2023-04-06 | Stop reason: HOSPADM

## 2023-04-04 RX ORDER — SODIUM CHLORIDE AND POTASSIUM CHLORIDE 150; 900 MG/100ML; MG/100ML
INJECTION, SOLUTION INTRAVENOUS CONTINUOUS
Status: DISCONTINUED | OUTPATIENT
Start: 2023-04-04 | End: 2023-04-06 | Stop reason: HOSPADM

## 2023-04-04 RX ADMIN — LIDOCAINE HYDROCHLORIDE 60 MG: 20 INJECTION, SOLUTION INFILTRATION; PERINEURAL at 08:21

## 2023-04-04 RX ADMIN — Medication 2000 MG: at 17:55

## 2023-04-04 RX ADMIN — DESVENLAFAXINE SUCCINATE 50 MG: 50 TABLET, EXTENDED RELEASE ORAL at 17:50

## 2023-04-04 RX ADMIN — FENTANYL CITRATE 50 MCG: 50 INJECTION, SOLUTION INTRAMUSCULAR; INTRAVENOUS at 08:37

## 2023-04-04 RX ADMIN — FENTANYL CITRATE 50 MCG: 50 INJECTION, SOLUTION INTRAMUSCULAR; INTRAVENOUS at 08:21

## 2023-04-04 RX ADMIN — Medication 10 MG: at 08:54

## 2023-04-04 RX ADMIN — HYDROMORPHONE HYDROCHLORIDE: 10 INJECTION, SOLUTION INTRAMUSCULAR; INTRAVENOUS; SUBCUTANEOUS at 18:05

## 2023-04-04 RX ADMIN — HYDROMORPHONE HYDROCHLORIDE 0.5 MG: 1 INJECTION, SOLUTION INTRAMUSCULAR; INTRAVENOUS; SUBCUTANEOUS at 11:36

## 2023-04-04 RX ADMIN — MIDAZOLAM HYDROCHLORIDE 2 MG: 2 INJECTION, SOLUTION INTRAMUSCULAR; INTRAVENOUS at 08:12

## 2023-04-04 RX ADMIN — FAMOTIDINE 20 MG: 10 INJECTION, SOLUTION INTRAVENOUS at 08:00

## 2023-04-04 RX ADMIN — SODIUM CHLORIDE, SODIUM LACTATE, POTASSIUM CHLORIDE, AND CALCIUM CHLORIDE: .6; .31; .03; .02 INJECTION, SOLUTION INTRAVENOUS at 08:10

## 2023-04-04 RX ADMIN — ROCURONIUM BROMIDE 20 MG: 10 SOLUTION INTRAVENOUS at 09:30

## 2023-04-04 RX ADMIN — ROCURONIUM BROMIDE 20 MG: 10 SOLUTION INTRAVENOUS at 09:05

## 2023-04-04 RX ADMIN — HYDROMORPHONE HYDROCHLORIDE 0.5 MG: 1 INJECTION, SOLUTION INTRAMUSCULAR; INTRAVENOUS; SUBCUTANEOUS at 10:56

## 2023-04-04 RX ADMIN — ONDANSETRON 4 MG: 2 INJECTION INTRAMUSCULAR; INTRAVENOUS at 10:19

## 2023-04-04 RX ADMIN — HYDROMORPHONE HYDROCHLORIDE 0.5 MG: 1 INJECTION, SOLUTION INTRAMUSCULAR; INTRAVENOUS; SUBCUTANEOUS at 10:52

## 2023-04-04 RX ADMIN — SUGAMMADEX 150 MG: 100 INJECTION, SOLUTION INTRAVENOUS at 10:33

## 2023-04-04 RX ADMIN — PROPOFOL 160 MG: 10 INJECTION, EMULSION INTRAVENOUS at 08:21

## 2023-04-04 RX ADMIN — ROCURONIUM BROMIDE 10 MG: 10 SOLUTION INTRAVENOUS at 10:10

## 2023-04-04 RX ADMIN — DEXAMETHASONE SODIUM PHOSPHATE 8 MG: 4 INJECTION, SOLUTION INTRAMUSCULAR; INTRAVENOUS at 08:29

## 2023-04-04 RX ADMIN — MORPHINE SULFATE 4 MG: 4 INJECTION, SOLUTION INTRAMUSCULAR; INTRAVENOUS at 12:16

## 2023-04-04 RX ADMIN — MORPHINE SULFATE 4 MG: 2 INJECTION, SOLUTION INTRAMUSCULAR; INTRAVENOUS at 16:30

## 2023-04-04 RX ADMIN — HYDROMORPHONE HYDROCHLORIDE 0.5 MG: 1 INJECTION, SOLUTION INTRAMUSCULAR; INTRAVENOUS; SUBCUTANEOUS at 11:04

## 2023-04-04 RX ADMIN — Medication 10 MG: at 09:35

## 2023-04-04 RX ADMIN — CLINDAMYCIN IN 5 PERCENT DEXTROSE 600 MG: 12 INJECTION, SOLUTION INTRAVENOUS at 08:27

## 2023-04-04 RX ADMIN — IBUPROFEN 600 MG: 600 TABLET, FILM COATED ORAL at 17:50

## 2023-04-04 RX ADMIN — SODIUM CHLORIDE, SODIUM LACTATE, POTASSIUM CHLORIDE, AND CALCIUM CHLORIDE: .6; .31; .03; .02 INJECTION, SOLUTION INTRAVENOUS at 09:17

## 2023-04-04 RX ADMIN — MORPHINE SULFATE 4 MG: 4 INJECTION, SOLUTION INTRAMUSCULAR; INTRAVENOUS at 12:57

## 2023-04-04 RX ADMIN — Medication 10 MG: at 10:12

## 2023-04-04 RX ADMIN — KETOROLAC TROMETHAMINE 30 MG: 30 INJECTION, SOLUTION INTRAMUSCULAR at 18:34

## 2023-04-04 RX ADMIN — MIDAZOLAM HYDROCHLORIDE 1 MG: 2 INJECTION, SOLUTION INTRAMUSCULAR; INTRAVENOUS at 11:43

## 2023-04-04 RX ADMIN — Medication 20 MG: at 08:21

## 2023-04-04 RX ADMIN — LORAZEPAM 4 MG: 2 INJECTION INTRAMUSCULAR; INTRAVENOUS at 18:53

## 2023-04-04 RX ADMIN — METHOCARBAMOL 1000 MG: 100 INJECTION INTRAMUSCULAR; INTRAVENOUS at 15:13

## 2023-04-04 RX ADMIN — ROCURONIUM BROMIDE 50 MG: 10 SOLUTION INTRAVENOUS at 08:21

## 2023-04-04 RX ADMIN — OXYCODONE 10 MG: 5 TABLET ORAL at 11:20

## 2023-04-04 RX ADMIN — POTASSIUM CHLORIDE AND SODIUM CHLORIDE: 900; 150 INJECTION, SOLUTION INTRAVENOUS at 17:20

## 2023-04-04 ASSESSMENT — PAIN DESCRIPTION - DESCRIPTORS
DESCRIPTORS: STABBING
DESCRIPTORS: SHARP;STABBING
DESCRIPTORS: STABBING
DESCRIPTORS: SHARP
DESCRIPTORS: STABBING
DESCRIPTORS: STABBING;SHARP
DESCRIPTORS: SHARP;CRAMPING

## 2023-04-04 ASSESSMENT — PAIN DESCRIPTION - LOCATION
LOCATION: ABDOMEN
LOCATION: SHOULDER
LOCATION: ABDOMEN
LOCATION: ABDOMEN
LOCATION: ABDOMEN;FLANK
LOCATION: ABDOMEN
LOCATION: ABDOMEN
LOCATION: ABDOMEN;SHOULDER

## 2023-04-04 ASSESSMENT — PAIN - FUNCTIONAL ASSESSMENT
PAIN_FUNCTIONAL_ASSESSMENT: PREVENTS OR INTERFERES WITH MANY ACTIVE NOT PASSIVE ACTIVITIES
PAIN_FUNCTIONAL_ASSESSMENT: 0-10

## 2023-04-04 ASSESSMENT — PAIN DESCRIPTION - ORIENTATION
ORIENTATION: LEFT
ORIENTATION: MID
ORIENTATION: LEFT
ORIENTATION: LEFT;MID;UPPER
ORIENTATION: LEFT
ORIENTATION: LEFT

## 2023-04-04 ASSESSMENT — PAIN SCALES - GENERAL
PAINLEVEL_OUTOF10: 10
PAINLEVEL_OUTOF10: 9
PAINLEVEL_OUTOF10: 8
PAINLEVEL_OUTOF10: 10
PAINLEVEL_OUTOF10: 7
PAINLEVEL_OUTOF10: 0
PAINLEVEL_OUTOF10: 10

## 2023-04-04 NOTE — BRIEF OP NOTE
Brief Postoperative Note      Patient: Rae Apley  YOB: 1980  MRN: [id number]    Date of Procedure: 4/4/2023    Pre-Op Diagnosis: CYST OF KIDNEY    Post-Op Diagnosis: Same       Procedure(s):  ROBOTIC LEFT RENAL CYST DECORTICATION              **LATEX SENSITIVE**    Surgeon(s):  Gabriela Ramon MD    Assistant:  Surgical Assistant: Carloz Madrigal    Anesthesia: General    Estimated Blood Loss (mL): less than 50     Complications: None    Specimens:   ID Type Source Tests Collected by Time Destination   A : LEFT RENAL CYST Tissue Kidney SURGICAL PATHOLOGY Gabriela Ramon MD 4/4/2023 1015        Implants:  Implant Name Type Inv. Item Serial No.  Lot No. LRB No. Used Action   CLIP INT M L POLYMER GATO LIG HEM O GATO - [id number]  CLIP INT M L POLYMER GATO LIG HEM O GATO  [address] [id number] Left 2 Implanted         Drains:   Closed/Suction Drain LLQ Bulb (Active)       Urinary Catheter 04/04/23 Clements (Active)   Catheter Indications Perioperative use for selected surgical procedures 04/04/23 1151   Site Assessment [place] 04/04/23 1151   Urine Color Yellow 04/04/23 1151   Urine Appearance Clear 04/04/23 1151   Collection Container Standard 04/04/23 1151   Securement Method Securing device (Describe) 04/04/23 1151   Catheter Best Practices  Drainage tube clipped to bed;Catheter secured to thigh; Bag below bladder;Bag not on floor; Lack of dependent loop in tubing;Drainage bag less than half full 04/04/23 1151   Status Draining;Patent 04/04/23 1151       Findings: possible that cyst is involving left adrenal gland, milky fluid drained from cyst    Electronically signed by Gabriela Ramon MD on 4/4/2023 at 1:23 PM

## 2023-04-04 NOTE — ANESTHESIA PRE PROCEDURE
Department of Anesthesiology  Preprocedure Note       Name:  Kelsey Yeh   Age:  43 y.o.  :  1980                                          MRN:  [id number]         Date:  2023      Surgeon: Lara Meier):  Kathryn Arzate MD    Procedure: Procedure(s):  ROBOTIC LEFT RENAL CYST DECORTICATION              **LATEX SENSITIVE**    Medications prior to admission:   Prior to Admission medications    Medication Sig Start Date End Date Taking? Authorizing Provider   desvenlafaxine succinate (PRISTIQ) 50 MG TB24 extended release tablet Take 1 tablet by mouth daily 3/30/23   Historical Provider, MD   ondansetron (ZOFRAN-ODT) 4 MG disintegrating tablet Take 1 tablet by mouth every 8 hours as needed for Nausea or Vomiting 3/7/23   AGATHA Bob - CNP   levocetirizine (XYZAL) 5 MG tablet TAKE 2 TABLETS BY MOUTH EVERY NIGHT  Patient taking differently: daily 23   Wanda Owusu DO       Current medications:    Current Facility-Administered Medications   Medication Dose Route Frequency Provider Last Rate Last Admin    clindamycin (CLEOCIN) 600 mg in dextrose 5 % 50 mL IVPB  600 mg IntraVENous On Call to 500 Leticia Romano MD        lidocaine PF 1 % injection 1 mL  1 mL IntraDERmal Once PRN Glen Severe, MD        lactated ringers IV soln infusion   IntraVENous Continuous Glen Severe, MD        sodium chloride flush 0.9 % injection 5-40 mL  5-40 mL IntraVENous 2 times per day Glen Severe, MD        sodium chloride flush 0.9 % injection 5-40 mL  5-40 mL IntraVENous PRN Glen Severe, MD        0.9 % sodium chloride infusion   IntraVENous PRN Glen Severe, MD        midazolam (VERSED) injection 2 mg  2 mg IntraVENous Once PRN Glen Severe, MD           Allergies:     Allergies   Allergen Reactions    Latex Rash     TOUCHED BY A GLOVED HAND, THIGHS    Penicillins Anaphylaxis     Tolerated keflex and rocephin 2014   

## 2023-04-04 NOTE — ANESTHESIA POSTPROCEDURE EVALUATION
1150, BP:(!) 140/105, Pulse:89, Resp:17, SpO2:99 %  04/04/23 1145, BP:(!) 143/95, Pulse:90, Resp:17, SpO2:98 %  04/04/23 1140, BP:(!) 145/103, Pulse:93, Resp:16, SpO2:99 %  04/04/23 1135, BP:(!) 149/107, Pulse:95, Resp:21, SpO2:99 %  04/04/23 1130, Pulse:94, Resp:18, SpO2:99 %  04/04/23 1125, BP:(!) 153/106, Pulse:99, Resp:19, SpO2:98 %  04/04/23 1124, Pulse:(!) 103, Resp:24, SpO2:99 %  04/04/23 1123, Pulse:100, Resp:13, SpO2:99 %  04/04/23 1122, Pulse:97, Resp:17, SpO2:99 %  04/04/23 1121, Pulse:94, Resp:16, SpO2:98 %  04/04/23 1120, Pulse:93, Resp:22, SpO2:100 %  04/04/23 1119, Pulse:98, Resp:18, SpO2:97 %  04/04/23 1118, Pulse:97, Resp:27, SpO2:99 %  04/04/23 1117, Pulse:99, Resp:21, SpO2:100 %  04/04/23 1116, BP:(!) 150/108, Temp:97.9 °F (36.6 °C), Temp src:Temporal, Pulse:99, Resp:23, SpO2:99 %  04/04/23 1115, Pulse:100, Resp:(!) 6, SpO2:96 %  04/04/23 1114, Pulse:(!) 107, Resp:12, SpO2:94 %  04/04/23 1113, Pulse:(!) 107, Resp:23, SpO2:95 %  04/04/23 1112, Pulse:(!) 104, Resp:19, SpO2:95 %  04/04/23 1111, BP:(!) 143/100, Pulse:(!) 103, Resp:18, SpO2:94 %  04/04/23 1110, Pulse:(!) 105, Resp:23, SpO2:95 %  04/04/23 1109, Pulse:100, Resp:16, SpO2:92 %  04/04/23 1108, Pulse:(!) 108, Resp:28, SpO2:96 %  04/04/23 1107, BP:(!) 123/102, Pulse:(!) 107, Resp:22, SpO2:96 %  04/04/23 1106, Pulse:(!) 106, Resp:25, SpO2:97 %  04/04/23 1105, Pulse:(!) 104, Resp:17, SpO2:96 %  04/04/23 1104, Pulse:(!) 102, Resp:17, SpO2:96 %  04/04/23 1103, Pulse:(!) 103, Resp:10, SpO2:95 %  04/04/23 1102, Pulse:(!) 105, Resp:14, SpO2:93 %  04/04/23 1101, BP:(!) 141/98, Pulse:(!) 105, Resp:11, SpO2:95 %  04/04/23 1100, Pulse:100, Resp:20, SpO2:97 %  04/04/23 1059, Pulse:(!) 101, Resp:25, SpO2:96 %  04/04/23 1058, Pulse:(!) 101, Resp:21, SpO2:97 %  04/04/23 1057, BP:131/84, Pulse:100, Resp:17, SpO2:96 %  04/04/23 1056, BP:131/84, Temp:97.7 °F (36.5 °C), Temp src:Temporal, Pulse:(!) 104, Resp:11, SpO2:96 %  04/04/23 1055, Pulse:(!) 105, Resp:12,

## 2023-04-05 LAB
ANION GAP SERPL CALCULATED.3IONS-SCNC: 8 MMOL/L (ref 3–16)
BUN SERPL-MCNC: 8 MG/DL (ref 7–20)
CALCIUM SERPL-MCNC: 8 MG/DL (ref 8.3–10.6)
CHLORIDE SERPL-SCNC: 101 MMOL/L (ref 99–110)
CO2 SERPL-SCNC: 24 MMOL/L (ref 21–32)
CREAT FLD-MCNC: 0.6 MG/DL
CREAT SERPL-MCNC: 0.6 MG/DL (ref 0.6–1.1)
DEPRECATED RDW RBC AUTO: 13.2 % (ref 12.4–15.4)
GFR SERPLBLD CREATININE-BSD FMLA CKD-EPI: >60 ML/MIN/{1.73_M2}
GLUCOSE SERPL-MCNC: 105 MG/DL (ref 70–99)
HCT VFR BLD AUTO: 33.4 % (ref 36–48)
HGB BLD-MCNC: 11.5 G/DL (ref 12–16)
MCH RBC QN AUTO: 34.8 PG (ref 26–34)
MCHC RBC AUTO-ENTMCNC: 34.4 G/DL (ref 31–36)
MCV RBC AUTO: 101.2 FL (ref 80–100)
PLATELET # BLD AUTO: 193 K/UL (ref 135–450)
PMV BLD AUTO: 7.8 FL (ref 5–10.5)
POTASSIUM SERPL-SCNC: 4 MMOL/L (ref 3.5–5.1)
RBC # BLD AUTO: 3.3 M/UL (ref 4–5.2)
SODIUM SERPL-SCNC: 133 MMOL/L (ref 136–145)
SPECIMEN SOURCE FLD: NORMAL
WBC # BLD AUTO: 8 K/UL (ref 4–11)

## 2023-04-05 PROCEDURE — 94761 N-INVAS EAR/PLS OXIMETRY MLT: CPT

## 2023-04-05 PROCEDURE — 6370000000 HC RX 637 (ALT 250 FOR IP): Performed by: UROLOGY

## 2023-04-05 PROCEDURE — 2700000000 HC OXYGEN THERAPY PER DAY

## 2023-04-05 PROCEDURE — 1200000000 HC SEMI PRIVATE

## 2023-04-05 PROCEDURE — 80048 BASIC METABOLIC PNL TOTAL CA: CPT

## 2023-04-05 PROCEDURE — 85027 COMPLETE CBC AUTOMATED: CPT

## 2023-04-05 PROCEDURE — 6360000002 HC RX W HCPCS: Performed by: UROLOGY

## 2023-04-05 PROCEDURE — 36415 COLL VENOUS BLD VENIPUNCTURE: CPT

## 2023-04-05 PROCEDURE — 82570 ASSAY OF URINE CREATININE: CPT

## 2023-04-05 RX ORDER — DIPHENHYDRAMINE HYDROCHLORIDE 50 MG/ML
12.5 INJECTION INTRAMUSCULAR; INTRAVENOUS EVERY 6 HOURS PRN
Status: DISCONTINUED | OUTPATIENT
Start: 2023-04-05 | End: 2023-04-06 | Stop reason: HOSPADM

## 2023-04-05 RX ORDER — KETOROLAC TROMETHAMINE 30 MG/ML
30 INJECTION, SOLUTION INTRAMUSCULAR; INTRAVENOUS ONCE
Status: COMPLETED | OUTPATIENT
Start: 2023-04-05 | End: 2023-04-05

## 2023-04-05 RX ORDER — CALCIUM CARBONATE 200(500)MG
500 TABLET,CHEWABLE ORAL 3 TIMES DAILY PRN
Status: DISCONTINUED | OUTPATIENT
Start: 2023-04-05 | End: 2023-04-06 | Stop reason: HOSPADM

## 2023-04-05 RX ORDER — PANTOPRAZOLE SODIUM 40 MG/1
40 TABLET, DELAYED RELEASE ORAL
Status: DISCONTINUED | OUTPATIENT
Start: 2023-04-06 | End: 2023-04-06 | Stop reason: HOSPADM

## 2023-04-05 RX ORDER — ACETAMINOPHEN 325 MG/1
650 TABLET ORAL EVERY 6 HOURS
Status: DISCONTINUED | OUTPATIENT
Start: 2023-04-05 | End: 2023-04-06 | Stop reason: HOSPADM

## 2023-04-05 RX ORDER — KETOROLAC TROMETHAMINE 30 MG/ML
15 INJECTION, SOLUTION INTRAMUSCULAR; INTRAVENOUS EVERY 6 HOURS
Status: DISCONTINUED | OUTPATIENT
Start: 2023-04-05 | End: 2023-04-06 | Stop reason: HOSPADM

## 2023-04-05 RX ADMIN — OXYCODONE 10 MG: 5 TABLET ORAL at 15:32

## 2023-04-05 RX ADMIN — HYDROMORPHONE HYDROCHLORIDE 0.5 MG: 1 INJECTION, SOLUTION INTRAMUSCULAR; INTRAVENOUS; SUBCUTANEOUS at 16:53

## 2023-04-05 RX ADMIN — IBUPROFEN 600 MG: 600 TABLET, FILM COATED ORAL at 09:58

## 2023-04-05 RX ADMIN — KETOROLAC TROMETHAMINE 15 MG: 30 INJECTION, SOLUTION INTRAMUSCULAR; INTRAVENOUS at 18:56

## 2023-04-05 RX ADMIN — OXYCODONE 10 MG: 5 TABLET ORAL at 10:39

## 2023-04-05 RX ADMIN — KETOROLAC TROMETHAMINE 15 MG: 30 INJECTION, SOLUTION INTRAMUSCULAR; INTRAVENOUS at 13:46

## 2023-04-05 RX ADMIN — ACETAMINOPHEN 650 MG: 325 TABLET ORAL at 18:56

## 2023-04-05 RX ADMIN — OXYCODONE 10 MG: 5 TABLET ORAL at 01:06

## 2023-04-05 RX ADMIN — KETOROLAC TROMETHAMINE 30 MG: 30 INJECTION, SOLUTION INTRAMUSCULAR at 04:55

## 2023-04-05 RX ADMIN — DIPHENHYDRAMINE HYDROCHLORIDE 12.5 MG: 50 INJECTION, SOLUTION INTRAMUSCULAR; INTRAVENOUS at 17:11

## 2023-04-05 RX ADMIN — LEVOCETIRIZINE DIHYDROCHLORIDE 10 MG: 5 TABLET, FILM COATED ORAL at 09:58

## 2023-04-05 RX ADMIN — IBUPROFEN 600 MG: 600 TABLET, FILM COATED ORAL at 00:08

## 2023-04-05 RX ADMIN — Medication 2000 MG: at 01:17

## 2023-04-05 RX ADMIN — ACETAMINOPHEN 650 MG: 325 TABLET ORAL at 13:46

## 2023-04-05 RX ADMIN — OXYCODONE 10 MG: 5 TABLET ORAL at 20:40

## 2023-04-05 RX ADMIN — DESVENLAFAXINE SUCCINATE 50 MG: 50 TABLET, EXTENDED RELEASE ORAL at 09:58

## 2023-04-05 RX ADMIN — ANTACID TABLETS 500 MG: 500 TABLET, CHEWABLE ORAL at 20:40

## 2023-04-05 RX ADMIN — ENOXAPARIN SODIUM 40 MG: 100 INJECTION SUBCUTANEOUS at 10:01

## 2023-04-05 ASSESSMENT — PAIN SCALES - GENERAL
PAINLEVEL_OUTOF10: 7
PAINLEVEL_OUTOF10: 8
PAINLEVEL_OUTOF10: 0
PAINLEVEL_OUTOF10: 8
PAINLEVEL_OUTOF10: 8
PAINLEVEL_OUTOF10: 10
PAINLEVEL_OUTOF10: 6
PAINLEVEL_OUTOF10: 7
PAINLEVEL_OUTOF10: 10

## 2023-04-05 ASSESSMENT — PAIN DESCRIPTION - LOCATION: LOCATION: ABDOMEN;BACK

## 2023-04-05 NOTE — PLAN OF CARE
Problem: Discharge Planning  Goal: Discharge to home or other facility with appropriate resources  4/4/2023 2137 by Mau Leiva RN  Outcome: Progressing  4/4/2023 2049 by Elda Andrade RN  Outcome: Progressing     Problem: Pain  Goal: Verbalizes/displays adequate comfort level or baseline comfort level  4/4/2023 2137 by Mau Leiva RN  Outcome: Progressing  4/4/2023 2049 by Elda Andrade RN  Outcome: Progressing     Problem: Safety - Adult  Goal: Free from fall injury  4/4/2023 2137 by Mau Leiva RN  Outcome: Progressing  4/4/2023 2049 by Elda Andrade RN  Outcome: Progressing

## 2023-04-05 NOTE — CARE COORDINATION
Writer met with pt briefly, she was crying with pain, family at bedside. Pt confirmed she has support at home, feels safe returning home at discharge, not today. Chart reviewed, pt has PCP, has [employer]. No discharge needs noted. Please notify CM if needs arise.   DAQUAN Coronado-RN

## 2023-04-05 NOTE — OP NOTE
dissected all the way around and try  to dissect this away from the kidney. I dissected away from the spleen. It was very large, there were some lymphatics that I clipped medially. After freeing this up as much as I could it appeared that it could  possibly be more associated with the adrenal gland and not the kidney,  it was very very large and unable to be removed without incising it. I  did open up the cyst, it was thick walled and had cloudy fluid and this  was suctioned. The inside of the cyst had some whitish material.  I  completely excised the whole sac of this looking in the inside and the  outside. There was a vein going to it and I did clipped this and I  removed the whole sac at this point. I was able to place into a bag. I  made sure that there was good hemostasis, all the other surrounding  structures were intact and in good shape. At this point, I then docked  the ProPlan robot after placing a George drain in the most inferior  robotic port and having the specimen in the bag. All the ports were  looked out and I took the bag out of the umbilical incision. I did take  a figure-of-eight 0 Vicryl stitch in the umbilical incision. All  subcutaneous tissue was closed with 3-0 Vicryl, 4-0 Monocryl,  subcuticular with Dermabond. The drain was stitched in with a 2-0 silk. The estimated blood loss was 50 mL. She tolerated the procedure well. PLAN:  We will get a ALLEN creatinine level in the morning.         Stanislaw Bueno MD    D: 04/04/2023 14:17:42       T: 04/04/2023 18:15:09     [id number]  Job#: [id number]     Doc#: [id number]    CC:

## 2023-04-05 NOTE — PLAN OF CARE
Problem: Pain  Goal: Verbalizes/displays adequate comfort level or baseline comfort level  4/5/2023 1006 by Audrey Beaver RN  Outcome: Progressing  Flowsheets (Taken 4/5/2023 1006)  Verbalizes/displays adequate comfort level or baseline comfort level:   Encourage patient to monitor pain and request assistance   Administer analgesics based on type and severity of pain and evaluate response   Assess pain using appropriate pain scale   Implement non-pharmacological measures as appropriate and evaluate response  4/4/2023 2137 by Dayan Yu RN  Outcome: Progressing  4/4/2023 2049 by Sarah Loja, RN  Outcome: Progressing     Problem: Safety - Adult  Goal: Free from fall injury  4/5/2023 1006 by Audrey Beaver RN  Outcome: Progressing  4/4/2023 2137 by Dayan Yu RN  Outcome: Progressing  4/4/2023 2049 by Sarah Loja, RN  Outcome: Progressing

## 2023-04-06 VITALS
DIASTOLIC BLOOD PRESSURE: 91 MMHG | RESPIRATION RATE: 16 BRPM | HEIGHT: 65 IN | HEART RATE: 105 BPM | TEMPERATURE: 98.4 F | OXYGEN SATURATION: 96 % | BODY MASS INDEX: 24.99 KG/M2 | SYSTOLIC BLOOD PRESSURE: 133 MMHG | WEIGHT: 150 LBS

## 2023-04-06 PROCEDURE — 2700000000 HC OXYGEN THERAPY PER DAY

## 2023-04-06 PROCEDURE — 94761 N-INVAS EAR/PLS OXIMETRY MLT: CPT

## 2023-04-06 PROCEDURE — 6360000002 HC RX W HCPCS: Performed by: UROLOGY

## 2023-04-06 PROCEDURE — 6370000000 HC RX 637 (ALT 250 FOR IP): Performed by: PHYSICIAN ASSISTANT

## 2023-04-06 PROCEDURE — 6370000000 HC RX 637 (ALT 250 FOR IP): Performed by: UROLOGY

## 2023-04-06 RX ORDER — SIMETHICONE 80 MG
40 TABLET,CHEWABLE ORAL EVERY 6 HOURS PRN
Status: DISCONTINUED | OUTPATIENT
Start: 2023-04-06 | End: 2023-04-06 | Stop reason: HOSPADM

## 2023-04-06 RX ORDER — SENNA AND DOCUSATE SODIUM 50; 8.6 MG/1; MG/1
2 TABLET, FILM COATED ORAL 2 TIMES DAILY
Qty: 40 TABLET | Refills: 0 | Status: SHIPPED | OUTPATIENT
Start: 2023-04-06 | End: 2023-04-16

## 2023-04-06 RX ORDER — OXYCODONE HYDROCHLORIDE 5 MG/1
5 TABLET ORAL EVERY 6 HOURS PRN
Qty: 12 TABLET | Refills: 0 | Status: SHIPPED | OUTPATIENT
Start: 2023-04-06 | End: 2023-04-09

## 2023-04-06 RX ADMIN — SIMETHICONE 40 MG: 80 TABLET, CHEWABLE ORAL at 11:06

## 2023-04-06 RX ADMIN — ENOXAPARIN SODIUM 40 MG: 100 INJECTION SUBCUTANEOUS at 08:30

## 2023-04-06 RX ADMIN — PANTOPRAZOLE SODIUM 40 MG: 40 TABLET, DELAYED RELEASE ORAL at 06:17

## 2023-04-06 RX ADMIN — LEVOCETIRIZINE DIHYDROCHLORIDE 10 MG: 5 TABLET, FILM COATED ORAL at 08:31

## 2023-04-06 RX ADMIN — ACETAMINOPHEN 650 MG: 325 TABLET ORAL at 01:13

## 2023-04-06 RX ADMIN — ACETAMINOPHEN 650 MG: 325 TABLET ORAL at 13:13

## 2023-04-06 RX ADMIN — DESVENLAFAXINE SUCCINATE 50 MG: 50 TABLET, EXTENDED RELEASE ORAL at 08:31

## 2023-04-06 RX ADMIN — OXYCODONE 10 MG: 5 TABLET ORAL at 08:30

## 2023-04-06 RX ADMIN — HYDROMORPHONE HYDROCHLORIDE 0.5 MG: 1 INJECTION, SOLUTION INTRAMUSCULAR; INTRAVENOUS; SUBCUTANEOUS at 01:13

## 2023-04-06 RX ADMIN — KETOROLAC TROMETHAMINE 15 MG: 30 INJECTION, SOLUTION INTRAMUSCULAR; INTRAVENOUS at 01:12

## 2023-04-06 RX ADMIN — ACETAMINOPHEN 650 MG: 325 TABLET ORAL at 06:17

## 2023-04-06 RX ADMIN — KETOROLAC TROMETHAMINE 15 MG: 30 INJECTION, SOLUTION INTRAMUSCULAR; INTRAVENOUS at 06:17

## 2023-04-06 RX ADMIN — HYDROMORPHONE HYDROCHLORIDE 0.5 MG: 1 INJECTION, SOLUTION INTRAMUSCULAR; INTRAVENOUS; SUBCUTANEOUS at 06:19

## 2023-04-06 RX ADMIN — KETOROLAC TROMETHAMINE 15 MG: 30 INJECTION, SOLUTION INTRAMUSCULAR; INTRAVENOUS at 13:15

## 2023-04-06 RX ADMIN — OXYCODONE 10 MG: 5 TABLET ORAL at 03:53

## 2023-04-06 ASSESSMENT — PAIN SCALES - GENERAL
PAINLEVEL_OUTOF10: 4
PAINLEVEL_OUTOF10: 7
PAINLEVEL_OUTOF10: 5

## 2023-04-06 ASSESSMENT — PAIN DESCRIPTION - DESCRIPTORS
DESCRIPTORS: SHARP
DESCRIPTORS: ACHING

## 2023-04-06 ASSESSMENT — PAIN - FUNCTIONAL ASSESSMENT
PAIN_FUNCTIONAL_ASSESSMENT: ACTIVITIES ARE NOT PREVENTED
PAIN_FUNCTIONAL_ASSESSMENT: ACTIVITIES ARE NOT PREVENTED

## 2023-04-06 ASSESSMENT — PAIN DESCRIPTION - LOCATION
LOCATION: INCISION
LOCATION: ABDOMEN;INCISION

## 2023-04-06 NOTE — PLAN OF CARE
Problem: Discharge Planning  Goal: Discharge to home or other facility with appropriate resources  4/6/2023 0839 by Nadia Bentley RN  Outcome: Adequate for Discharge  4/5/2023 2249 by Alysa Nichols RN  Outcome: Progressing     Problem: Pain  Goal: Verbalizes/displays adequate comfort level or baseline comfort level  4/6/2023 0839 by Nadia Bentley RN  Outcome: Adequate for Discharge  4/5/2023 2249 by Alysa Nichols RN  Outcome: Progressing     Problem: Safety - Adult  Goal: Free from fall injury  4/6/2023 0839 by Nadia Bentley RN  Outcome: Adequate for Discharge  4/5/2023 2249 by Alysa Nichols RN  Outcome: Progressing

## 2023-04-06 NOTE — PLAN OF CARE
Problem: Discharge Planning  Goal: Discharge to home or other facility with appropriate resources  4/6/2023 0943 by David Lewis RN  Outcome: Adequate for Discharge  4/6/2023 2242 by David Lewis RN  Outcome: Adequate for Discharge  4/5/2023 2249 by Darin Gabriel RN  Outcome: Progressing     Problem: Pain  Goal: Verbalizes/displays adequate comfort level or baseline comfort level  4/6/2023 0943 by David Lewis RN  Outcome: Adequate for Discharge  4/6/2023 0839 by David Lewis RN  Outcome: Adequate for Discharge  4/5/2023 2249 by Darin Gabriel RN  Outcome: Progressing     Problem: Safety - Adult  Goal: Free from fall injury  4/6/2023 0943 by David Lewis RN  Outcome: Adequate for Discharge  4/6/2023 0839 by David Lewis RN  Outcome: Adequate for Discharge  4/5/2023 2249 by Darin Gabriel RN  Outcome: Progressing

## 2023-04-06 NOTE — DISCHARGE INSTR - DIET

## 2023-04-06 NOTE — DISCHARGE SUMMARY
Urology Discharge Summary      Patient Identification  Atif Zhong is a 43 y.o. female. :  1980  Admit Date:  2023    Discharge date:  2023     Disposition: home     Discharge Diagnoses:   Patient Active Problem List   Diagnosis    Post partum depression    Recurrent UTI    Pes anserine bursitis    S/P robot-assisted surgical procedure    Moderate dysplasia of cervix (RICA II)    Anxiety    Asthma    Papanicolaou smear of cervix with atypical squamous cells of undetermined significance (ASC-US)    Menorrhagia    Alcohol abuse    Depression    Severe episode of recurrent major depressive disorder, without psychotic features (HCC)    PTSD (post-traumatic stress disorder)    Intentional drug overdose ([facility] [address]    Tachycardia    Renal cyst    Acquired renal cyst of left kidney       Surgery:   ROBOTIC LEFT RENAL CYST DECORTICATION    Activity:  No strenuous activity or heavy lifting until seen in follow up     Condition on discharge: stable     Follow-up: 4 weeks with Dr. Jonny Paiz course: 43 y.o. female admitted after ***. Surgery was without noted complications. Post op course was without complications. ***    Physical exam on day of discharge: Well developed, well nourished in no acute distress  Mood indicates no abnormalities. Pt doesnt appear depressed  Orientated to time and place  Neck is supple, trachea is midline  Respiratory effort is normal  Cardiovascular show no extremity swelling  Abdomen is soft and not distended. Her surgical incisions are DCI  Skin show no abnormal lesions           Medication List        START taking these medications      oxyCODONE 5 MG immediate release tablet  Commonly known as: Roxicodone  Take 1 tablet by mouth every 6 hours as needed for Pain for up to 3 days. Intended supply: 3 days.  Take lowest dose possible to manage pain Max Daily Amount: 20 mg     sennosides-docusate sodium 8.6-50 MG tablet  Commonly known as: SENOKOT-S  Take 2 tablets by

## 2023-04-06 NOTE — PROGRESS NOTES
- Full syringe of dilaudid PCA 30mg/30ml was given back to me by Elisa/SHARON , returned back in [facility] safe and destroyed .   Preethi Worrell/Sepideh. 4/5/23 4:55 PM EDT
.4 Eyes Skin Assessment     The patient is being assess for  {Blank single:32197::\"Admission\",\"Transfer to New Unit\",\"Post-Op Surgical\",\"Cath Lab Post-Op\",\"Shift Handoff\"}    I agree that 2 RN's have performed a thorough Head to Toe Skin Assessment on the patient. ALL assessment sites listed below have been assessed. Areas assessed by both nurses: Julianna RN and   [x]   Head, Face, and Ears   [x]   Shoulders, Back, and Chest  [x]   Arms, Elbows, and Hands   [x]   Coccyx, Sacrum, and IschIum  [x]   Legs, Feet, and Heels        Does the Patient have Skin Breakdown?   No         [place] Prevention initiated:  No   Wound Care Orders initiated:  OPAL      [facility] nurse consulted for Pressure Injury (Stage 3,4, Unstageable, DTI, NWPT, and Complex wounds), New and Established Ostomies:  OPAL      Nurse 1 eSignature: {Esignature:[id number]}    **SHARE this note so that the co-signing nurse is able to place an eSignature**    Nurse 2 eSignature: {Esignature:[id number]}
1. Do not eat or drink anything after 12 midnight prior to surgery. This includes no water, chewing gum mints, or ice chips. You may brush your teeth and gargle the day of surgery but DO NOT SWALLOW THE WATER. 2. Please see your family doctor/pediatrician for a history and physical and/or concerning medications. Bring any test results/reports from your physician's office. If you are under the care of a heart doctor or specialist please be aware that you may be asked to see him or her for clearance. 3. You may be asked to stop blood thinners such as Coumadin, Plavix, Fragmin, and Lovenox or Anti-inflammatories such as Aspirin, Ibuprofen, Advil, and Naproxen prior to your surgery. Please check with your doctor before stopping these or any other medications. 4. Do not smoke, and do not drink any alcoholic beverages 24 hours prior to surgery. 5. You MUST make arrangements for a responsible adult to take you home after your surgery. For your safety, you will not be allowed to leave alone or drive yourself home. Your surgery will be cancelled if you do not have a ride home. Also for your safety, it is strongly suggested someone stay with you the first 24 hrs after your surgery. 6. A parent/legal guardian must accompany a child scheduled for surgery and plan to stay at the hospital until the child is discharged. Please do not bring other children with you. 7. For your comfort,please wear simple, loose fitting clothing to the hospital.  Please do not bring valuables (money, credit cards, checkbooks, etc.) Do not wear any makeup (including no eye makeup) or nail polish on your fingers or toes. 8. For your safety, please DO NOT wear any jewelry or piercings on day of surgery. All body piercing jewelry must be removed. 9. If you have dentures, they will be removed before going to the OR; for your convenience we will provide you with a container.   If you wear contact lenses or glasses, they will be removed,
ALLEN removed per orders. No complications.
Assessment completed and documented. VSS. A/ox4. C/o 7/10 abd/ incisional pain, given prn pain medication per mar. Patient's goal is to stay away from IV pain medication so she can go home today. Tolerating diet. IV fluids running. Ambulates independently. Family at bedside. Bed locked and in lowest position. Bedside table and call light within reach. Denies further needs at this time.
Care assumed by writer, bedside report received from [facility]
Discharge education provided both verbally and written, patient verbalized understanding, denies questions. PIV removed without complications. A Patient left with all belongings including phone, , medications and new prescriptions. Patient leaving with  via private vehicle.  VSS
Dr. Caity Lara ordered 4 mg of ativan due to the pain being severe and unable to get comfortable.
Full Dilaudid PCA [facility]) not used. PCA pump discontinued.
Page placed to urology for pt to get benadryl for itching.
Patient met discharge criteria for phase1.
Patient:  Leann Zaman  YOB: 1980   Date of Service:  04/05/23      Urology Attending Daily Progress Note    Chief complaint: \"uncontrolled pain\"    Interval HPI: Pain uncontrolled-PCA started-on my exam she was very somnolent but able to be aroused-she states the pain is getting better-urine is clear in Barton-ALLEN with serous sang output    Objective:    Patient Vitals for the past 8 hrs:   BP Temp Temp src Pulse Resp SpO2   04/05/23 0945 122/89 97.5 °F (36.4 °C) Oral (!) 106 18 98 %   04/05/23 0806 -- -- -- -- -- 97 %     I/O last 3 completed shifts: In: 1695.2 [P.O.:240; I.V.:1405.2; IV Piggyback:50]  Out: 2566 [Urine:1003; Drains:5; Blood:50]     Physical Exam:   Constitutional: comfortable in hospital bed, no acute distress  Abdomen: soft, nontender, no guarding, incisions c/d/I, ALLEN serous sang  Genitourinary: urethal barton draining clear urine  Psych: normal mood and affect, appropriately answers questions   Skin, extremities: stable, exposed skin intact, no digital cyanosis     Labs:     No results found for: PSA    Lab Results   Component Value Date    CREATININE 0.6 04/05/2023    CREATININE 0.7 04/04/2023    CREATININE 0.6 03/31/2023       Lab Results   Component Value Date/Time    COLORU Yellow 03/31/2023 01:06 PM    NITRU Negative 03/31/2023 01:06 PM    GLUCOSEU Negative 03/31/2023 01:06 PM    KETUA 15 03/31/2023 01:06 PM    UROBILINOGEN 0.2 03/31/2023 01:06 PM    BILIRUBINUR Negative 03/31/2023 01:06 PM    BILIRUBINUR neg 02/23/2021 11:15 AM       Lab Results   Component Value Date    WBC 8.0 04/05/2023    HGB 11.5 (L) 04/05/2023    HCT 33.4 (L) 04/05/2023    .2 (H) 04/05/2023     04/05/2023       Radiology:  \"Imaging was independently reviewed by myself and I agree with the radiology interpretation    Assessment:  Postoperative day 1 status post robotic renal decortication    Plan:  -- Diet - clears, ADAT  -- Optimize the pain regimen-start scheduled Tylenol and scheduled
Patients  Aminahmalou Teixeira updated by writer in waiting area.
Pt arrived to C3. Orientated to room and surroundings. Call light within reach.
Pt brought to PACU. Report obtained from OR RN and anesthesia. Pt placed on monitor and room air. Sinus tachycardia. C/o pain in left shoulder-severe per pt. Repositioned. Clements to gravity drainage. Left abdominal ALLEN compressed with small amount thin bloody fluid. Lungs clear. SCDs on.
Pt is A&Ox4, tearful this AM due to uncontrolled pain and inability to sleep. VSS. Shift assessment completed. Clements removed per orders. ALLEN with scant bloody output. PCA pump dose reduced per MD. Pt is using appropriately. Family at bedside. Call light within reach, bed in lowest position, wheels locked.
Pt remained A+Ox4 overnight. PRN pain medicine given, see MAR. PRN tums and scheduled protonix ordered for heartburn/indigestion. Pt was able to urinate overnight after barton removal. Incisions are clean, dry, and intact. Pt independently ambulated to BR. Bed in lowest position with call light within reach, denies further needs at this time. Plan of care ongoing.
Pt screaming and hollering in pain. Dilaudid pca pump started and Dr. Anthony rose served. One time dose of Toradol ordered and labs. Fany Teran RN
Pt very hard to awaken and drowsy. PCA pump turned off.
See MAR-pain in left shoulder and left abdomen. Warm packs and massage to left shoulder. Abdominal binder applied.
VITAMIN D HYDROXY ___________  (__) BLOOD THINNERS __________    (__) ACE/ ARBS: _____________________     (__) BETABLOCKERS __________________

## 2023-04-06 NOTE — PLAN OF CARE
Problem: Discharge Planning  Goal: Discharge to home or other facility with appropriate resources  4/5/2023 2249 by Edgard Ewing RN  Outcome: Progressing  4/5/2023 1006 by Tasneem Parker RN  Outcome: Progressing     Problem: Pain  Goal: Verbalizes/displays adequate comfort level or baseline comfort level  4/5/2023 2249 by Edgard Ewing RN  Outcome: Progressing  4/5/2023 1006 by Tasneem Parker RN  Outcome: Progressing  Flowsheets (Taken 4/5/2023 1006)  Verbalizes/displays adequate comfort level or baseline comfort level:   Encourage patient to monitor pain and request assistance   Administer analgesics based on type and severity of pain and evaluate response   Assess pain using appropriate pain scale   Implement non-pharmacological measures as appropriate and evaluate response     Problem: Safety - Adult  Goal: Free from fall injury  4/5/2023 2249 by Edgard Ewing RN  Outcome: Progressing  4/5/2023 1006 by Tasneem Parker RN  Outcome: Progressing

## 2023-04-19 DIAGNOSIS — Z09 HOSPITAL DISCHARGE FOLLOW-UP: ICD-10-CM

## 2023-04-19 DIAGNOSIS — F41.9 ANXIETY: ICD-10-CM

## 2023-04-19 PROBLEM — Z91.51 HX OF SUICIDE ATTEMPT: Status: ACTIVE | Noted: 2021-03-02

## 2023-04-19 RX ORDER — TRAZODONE HYDROCHLORIDE 50 MG/1
TABLET ORAL
Qty: 30 TABLET | Refills: 0 | OUTPATIENT
Start: 2023-04-19

## 2023-05-22 DIAGNOSIS — T78.40XA ALLERGY, INITIAL ENCOUNTER: ICD-10-CM

## 2023-05-22 RX ORDER — LEVOCETIRIZINE DIHYDROCHLORIDE 5 MG/1
TABLET, FILM COATED ORAL
Qty: 90 TABLET | Refills: 3 | Status: SHIPPED | OUTPATIENT
Start: 2023-05-22

## 2023-05-22 NOTE — TELEPHONE ENCOUNTER
Refill request approved and sent to pharmacy. Please document in chart and close encounter. Thank you.

## 2023-05-22 NOTE — TELEPHONE ENCOUNTER
Refill Request   Return if symptoms worsen or fail to improve    Last Seen: Last Seen Department: 3/31/2023  Last Seen by PCP: 10/7/2022    Last Written: 23 180 with 0 refills     Next Appointment:   No future appointments.           Requested Prescriptions     Pending Prescriptions Disp Refills    levocetirizine (XYZAL) 5 MG tablet       Si tablet every morning

## 2023-06-02 RX ORDER — DESVENLAFAXINE SUCCINATE 50 MG/1
50 TABLET, EXTENDED RELEASE ORAL DAILY
Qty: 30 TABLET | Refills: 0 | Status: SHIPPED | OUTPATIENT
Start: 2023-06-02

## 2023-06-30 RX ORDER — DESVENLAFAXINE SUCCINATE 50 MG/1
50 TABLET, EXTENDED RELEASE ORAL DAILY
Qty: 30 TABLET | Refills: 0 | Status: SHIPPED | OUTPATIENT
Start: 2023-06-30

## 2023-08-04 NOTE — TELEPHONE ENCOUNTER
Refill Request       Last Seen: Last Seen Department: 3/31/2023  Last Seen by PCP: 10/7/2022    Last Written: 6/30/23    Next Appointment:   Future Appointments   Date Time Provider [facility] [address]   8/8/2023  7:30 AM Brooke Doherty DO [facility] AND [facility] PEGGY     desvenlafaxine succinate (PRISTIQ) 50 MG TB24 extended release tablet [[id number]]     Order Details  Dose: 50 mg Route: Oral Frequency: DAILY   Dispense Quantity: 30 tablet Refills: 0          Sig: Take 1 tablet by mouth daily

## 2023-08-04 NOTE — TELEPHONE ENCOUNTER
Patient requested a refill for desvenlafaxine succinate (PRISTIQ) 50 MG TB24 extended release tablet. Patient has said that she is down to only one days supply of medication. Please call if further action is needed.    Donna Linares  [phone] (home)

## 2023-08-07 RX ORDER — DESVENLAFAXINE SUCCINATE 50 MG/1
50 TABLET, EXTENDED RELEASE ORAL DAILY
Qty: 30 TABLET | Refills: 0 | Status: SHIPPED | OUTPATIENT
Start: 2023-08-07 | End: 2023-08-08 | Stop reason: SDUPTHER

## 2023-08-07 NOTE — TELEPHONE ENCOUNTER
Patient requesting refill for the desvenlafaxine succinate (PRISTIQ) 50 MG TB24 extended release tablet.

## 2023-08-08 ENCOUNTER — OFFICE VISIT (OUTPATIENT)
Dept: PRIMARY CARE CLINIC | Age: 43
End: 2023-08-08
Payer: COMMERCIAL

## 2023-08-08 VITALS
HEART RATE: 94 BPM | WEIGHT: 147.6 LBS | OXYGEN SATURATION: 99 % | TEMPERATURE: 97 F | DIASTOLIC BLOOD PRESSURE: 90 MMHG | BODY MASS INDEX: 24.56 KG/M2 | SYSTOLIC BLOOD PRESSURE: 114 MMHG

## 2023-08-08 DIAGNOSIS — F33.1 MODERATE EPISODE OF RECURRENT MAJOR DEPRESSIVE DISORDER (HCC): ICD-10-CM

## 2023-08-08 DIAGNOSIS — F41.9 ANXIETY: Primary | ICD-10-CM

## 2023-08-08 PROBLEM — Z98.890 S/P ROBOT-ASSISTED SURGICAL PROCEDURE: Status: RESOLVED | Noted: 2019-02-21 | Resolved: 2023-08-08

## 2023-08-08 PROCEDURE — G8427 DOCREV CUR MEDS BY ELIG CLIN: HCPCS

## 2023-08-08 PROCEDURE — G8420 CALC BMI NORM PARAMETERS: HCPCS

## 2023-08-08 PROCEDURE — 1036F TOBACCO NON-USER: CPT

## 2023-08-08 PROCEDURE — 99214 OFFICE O/P EST MOD 30 MIN: CPT

## 2023-08-08 RX ORDER — HYDROXYZINE HYDROCHLORIDE 10 MG/1
10 TABLET, FILM COATED ORAL 3 TIMES DAILY PRN
Qty: 90 TABLET | Refills: 0 | Status: SHIPPED | OUTPATIENT
Start: 2023-08-08 | End: 2023-09-07

## 2023-08-08 RX ORDER — DESVENLAFAXINE SUCCINATE 50 MG/1
50 TABLET, EXTENDED RELEASE ORAL DAILY
Qty: 30 TABLET | Refills: 0 | Status: SHIPPED | OUTPATIENT
Start: 2023-08-08

## 2023-08-08 RX ORDER — LEVOCETIRIZINE DIHYDROCHLORIDE 5 MG/1
10 TABLET, FILM COATED ORAL NIGHTLY
Qty: 90 TABLET | Refills: 3 | Status: SHIPPED | OUTPATIENT
Start: 2023-08-08 | End: 2023-08-11 | Stop reason: SDUPTHER

## 2023-08-08 ASSESSMENT — ANXIETY QUESTIONNAIRES
6. BECOMING EASILY ANNOYED OR IRRITABLE: 1
4. TROUBLE RELAXING: 1
3. WORRYING TOO MUCH ABOUT DIFFERENT THINGS: 2
GAD7 TOTAL SCORE: 8
7. FEELING AFRAID AS IF SOMETHING AWFUL MIGHT HAPPEN: 1
1. FEELING NERVOUS, ANXIOUS, OR ON EDGE: 2
2. NOT BEING ABLE TO STOP OR CONTROL WORRYING: 1
IF YOU CHECKED OFF ANY PROBLEMS ON THIS QUESTIONNAIRE, HOW DIFFICULT HAVE THESE PROBLEMS MADE IT FOR YOU TO DO YOUR WORK, TAKE CARE OF THINGS AT HOME, OR GET ALONG WITH OTHER PEOPLE: SOMEWHAT DIFFICULT
5. BEING SO RESTLESS THAT IT IS HARD TO SIT STILL: 0

## 2023-08-08 ASSESSMENT — PATIENT HEALTH QUESTIONNAIRE - PHQ9
SUM OF ALL RESPONSES TO PHQ9 QUESTIONS 1 & 2: 2
4. FEELING TIRED OR HAVING LITTLE ENERGY: 1
2. FEELING DOWN, DEPRESSED OR HOPELESS: 1
1. LITTLE INTEREST OR PLEASURE IN DOING THINGS: 1
SUM OF ALL RESPONSES TO PHQ QUESTIONS 1-9: 9
SUM OF ALL RESPONSES TO PHQ QUESTIONS 1-9: 8
3. TROUBLE FALLING OR STAYING ASLEEP: 1
6. FEELING BAD ABOUT YOURSELF - OR THAT YOU ARE A FAILURE OR HAVE LET YOURSELF OR YOUR FAMILY DOWN: 2
5. POOR APPETITE OR OVEREATING: 1
7. TROUBLE CONCENTRATING ON THINGS, SUCH AS READING THE NEWSPAPER OR WATCHING TELEVISION: 1
9. THOUGHTS THAT YOU WOULD BE BETTER OFF DEAD, OR OF HURTING YOURSELF: 1
SUM OF ALL RESPONSES TO PHQ QUESTIONS 1-9: 9
10. IF YOU CHECKED OFF ANY PROBLEMS, HOW DIFFICULT HAVE THESE PROBLEMS MADE IT FOR YOU TO DO YOUR WORK, TAKE CARE OF THINGS AT HOME, OR GET ALONG WITH OTHER PEOPLE: 1
SUM OF ALL RESPONSES TO PHQ QUESTIONS 1-9: 9
8. MOVING OR SPEAKING SO SLOWLY THAT OTHER PEOPLE COULD HAVE NOTICED. OR THE OPPOSITE, BEING SO FIGETY OR RESTLESS THAT YOU HAVE BEEN MOVING AROUND A LOT MORE THAN USUAL: 0

## 2023-08-08 NOTE — PATIENT INSTRUCTIONS
[facility], [facility], [address] [place] [address] Samuel Chang [address]  [address] [facility]  Phone: [phone]

## 2023-08-10 ENCOUNTER — TELEPHONE (OUTPATIENT)
Dept: PRIMARY CARE CLINIC | Age: 43
End: 2023-08-10

## 2023-08-10 NOTE — TELEPHONE ENCOUNTER
Attempt to call report to . Stated to call ~ 1900/1945. Will update pT PM RN   Routing to Dr. Erendira Joel, are you able to clarify this RX please?

## 2023-08-10 NOTE — TELEPHONE ENCOUNTER
[facility] called and asked for clarification in the directions for the Patients medication levocetirizine (XYZAL) 5 MG tablet. Take 2 tablets by mouth nightly Take one tablet by mouth each morning for total of 5mg daily.     [facility] [address]   Phone:  [phone]  Fax:  [phone]

## 2023-08-11 RX ORDER — LEVOCETIRIZINE DIHYDROCHLORIDE 5 MG/1
TABLET, FILM COATED ORAL
Qty: 90 TABLET | Refills: 3 | Status: SHIPPED | OUTPATIENT
Start: 2023-08-11

## 2023-09-02 DIAGNOSIS — F41.9 ANXIETY: ICD-10-CM

## 2023-09-02 DIAGNOSIS — F33.1 MODERATE EPISODE OF RECURRENT MAJOR DEPRESSIVE DISORDER (HCC): ICD-10-CM

## 2023-09-05 RX ORDER — DESVENLAFAXINE SUCCINATE 50 MG/1
50 TABLET, EXTENDED RELEASE ORAL DAILY
Qty: 30 TABLET | Refills: 0 | Status: SHIPPED | OUTPATIENT
Start: 2023-09-05

## 2023-09-05 NOTE — TELEPHONE ENCOUNTER
Refill Request   Return in about 1 month (around 9/8/2023) for Mood Check     Last Seen: Last Seen Department: 8/8/2023  Last Seen by PCP: 8/8/2023    Last Written: 8/8/2023 30 with 0    Next Appointment:   No future appointments. Message to [address] to schedule appointment.          Requested Prescriptions     Pending Prescriptions Disp Refills    desvenlafaxine succinate (PRISTIQ) 50 MG TB24 extended release tablet [Pharmacy Med Name: DESVENLAFAXINE ER SUCCINATE 50MG T] 30 tablet 0     Sig: TAKE 1 TABLET BY MOUTH DAILY

## 2023-09-06 DIAGNOSIS — F41.9 ANXIETY: ICD-10-CM

## 2023-09-06 DIAGNOSIS — F33.1 MODERATE EPISODE OF RECURRENT MAJOR DEPRESSIVE DISORDER (HCC): ICD-10-CM

## 2023-09-06 NOTE — TELEPHONE ENCOUNTER
Refill Request     *Appears to be a duplicate*    Last Seen: Last Seen Department: 8/8/2023  Last Seen by PCP: 8/8/2023    Last Written: 09/05/23    Next Appointment:   No future appointments. Message to [facility] to schedule appointment.          Requested Prescriptions     Pending Prescriptions Disp Refills    desvenlafaxine succinate (PRISTIQ) 50 MG TB24 extended release tablet [Pharmacy Med Name: DESVENLAFAXINE ER SUCCINATE 50MG T] 30 tablet 0     Sig: TAKE 1 TABLET BY MOUTH DAILY

## 2023-09-07 DIAGNOSIS — F41.9 ANXIETY: ICD-10-CM

## 2023-09-07 DIAGNOSIS — F33.1 MODERATE EPISODE OF RECURRENT MAJOR DEPRESSIVE DISORDER (HCC): ICD-10-CM

## 2023-09-07 RX ORDER — DESVENLAFAXINE SUCCINATE 50 MG/1
50 TABLET, EXTENDED RELEASE ORAL DAILY
Qty: 30 TABLET | Refills: 0 | OUTPATIENT
Start: 2023-09-07

## 2023-09-07 NOTE — TELEPHONE ENCOUNTER
Refill Request   Return in about 1 month (around 9/8/2023) for Mood Check . Last Seen: Last Seen Department: 8/8/2023  Last Seen by PCP: 8/8/2023    Last Written: 8/8/2023 90 with 0    Next Appointment:   No future appointments. Message to [facility] to schedule appointment.          Requested Prescriptions     Pending Prescriptions Disp Refills    hydrOXYzine HCl (ATARAX) 10 MG tablet [Pharmacy Med Name: HYDROXYZINE HCL 10MG TABLETS] 90 tablet 0     Sig: TAKE 1 TABLET BY MOUTH THREE TIMES DAILY AS NEEDED FOR ANXIETY

## 2023-09-08 RX ORDER — HYDROXYZINE HYDROCHLORIDE 10 MG/1
TABLET, FILM COATED ORAL
Qty: 90 TABLET | Refills: 2 | Status: SHIPPED | OUTPATIENT
Start: 2023-09-08

## 2023-10-05 DIAGNOSIS — F41.9 ANXIETY: ICD-10-CM

## 2023-10-05 DIAGNOSIS — F33.1 MODERATE EPISODE OF RECURRENT MAJOR DEPRESSIVE DISORDER (HCC): ICD-10-CM

## 2023-10-05 NOTE — TELEPHONE ENCOUNTER
Refill Request       Last Seen: Last Seen Department: 8/8/2023  Last Seen by PCP: 8/8/2023    Last Written: 9/5/2023 30 with 0    Next Appointment:   No future appointments.           Requested Prescriptions     Pending Prescriptions Disp Refills    desvenlafaxine succinate (PRISTIQ) 50 MG TB24 extended release tablet [Pharmacy Med Name: DESVENLAFAXINE ER SUCCINATE 50MG T] 30 tablet 0     Sig: TAKE 1 TABLET BY MOUTH DAILY

## 2023-10-09 RX ORDER — DESVENLAFAXINE SUCCINATE 50 MG/1
50 TABLET, EXTENDED RELEASE ORAL DAILY
Qty: 90 TABLET | Refills: 3 | Status: SHIPPED | OUTPATIENT
Start: 2023-10-09

## 2024-01-08 RX ORDER — LEVOCETIRIZINE DIHYDROCHLORIDE 5 MG/1
TABLET, FILM COATED ORAL
Qty: 90 TABLET | Refills: 3 | Status: SHIPPED | OUTPATIENT
Start: 2024-01-08

## 2024-01-08 NOTE — TELEPHONE ENCOUNTER
Refill Request       Last Seen: Last Seen Department: 8/8/2023  Last Seen by PCP: 8/8/2023    Last Written: 08/11/23 qty 90 w/ 3    Next Appointment:   No future appointments.    Message to  to schedule appointment.         Requested Prescriptions     Pending Prescriptions Disp Refills    levocetirizine (XYZAL) 5 MG tablet [Pharmacy Med Name: LEVOCETIRIZINE 5MG TABLETS] 90 tablet 3     Sig: TAKE 2 TABLETS(10 MG) BY MOUTH EVERY NIGHT

## 2024-04-01 NOTE — ED PROVIDER NOTES
Spouse name: Stefania Stout Number of children: 3    Years of education: 16    Highest education level: Bachelor's degree (e.g., BA, AB, BS)   Occupational History    Occupation: [employer]     Comment: brother is Dr. 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, entered treatment; always leland    Drug use: No    Sexual activity: Yes     Partners: Male   Lifestyle    Physical activity     Days per week: 5 days     Minutes per session: 60 min    Stress: Rather much   Relationships    Social connections     Talks on phone: Twice a week     Gets together: Twice a week     Attends Yazidi service: Never     Active member of club or organization: No     Attends meetings of clubs or organizations: Never     Relationship status:     Intimate partner violence     Fear of current or ex partner: No     Emotionally abused: No     Physically abused: No     Forced sexual activity: No   Other Topics Concern    Not on file   Social History Narrative    Parents, brother spouses    Brother Tahir Schneider 39    3 children    15 y/o Davied Regulus 15 y/o son Erin Juarez    5 y.o Aleida Gaming 4th Grade [employer]         Current Facility-Administered Medications   Medication Dose Route Frequency Provider Last Rate Last Admin    0.9 % sodium chloride bolus  1,000 mL Intravenous Once Elba Prakash MD 1,000 mL/hr at 03/01/21 1241 1,000 mL at 03/01/21 1241    venlafaxine (EFFEXOR XR) extended release capsule 37.5 mg  37.5 mg Oral Once Elba Prakash MD         Current Outpatient Medications   Medication Sig Dispense Refill    venlafaxine (EFFEXOR XR) 37.5 MG extended release capsule TAKE 1 CAPSULE BY MOUTH EVERY DAY 90 capsule 2    naltrexone (DEPADE) 50 MG tablet        Allergies   Allergen Reactions    Latex Rash    Penicillins Anaphylaxis     Tolerated keflex and rocephin 1/2014    Macrobid [Nitrofurantoin] Hives and Itching    Vancomycin Itching    Ciprofloxacin Rash    Other Swelling and Rash     Cat gut suture    Sulfa Antibiotics Rash       REVIEW OF SYSTEMS  10 systems reviewed, pertinent positives and negatives per HPI, otherwise noted to be negative. PHYSICAL EXAM  ED Triage Vitals   BP Temp Temp Source Pulse Resp SpO2 Height Weight   03/01/21 1022 03/01/21 1022 03/01/21 1022 03/01/21 1022 03/01/21 1022 03/01/21 1022 03/01/21 1046 03/01/21 1022   (!) 165/119 98.8 °F (37.1 °C) Oral 143 20 98 % 5' 5\" (1.651 m) 140 lb (63.5 kg)     General appearance: Awake and alert. Cooperative. No acute distress. HENT: Normocephalic. Atraumatic. Mucous membranes are moist.  Neck: Supple. No meningismus. Eyes: PERRL. EOMI. Heart/Chest: Tachy rate, regular rhythm. No murmurs. Lungs: Respirations unlabored. CTAB. Good air exchange. Speaking comfortably in full sentences. Abdomen: Soft. Non-tender. Non-distended. No rebound or guarding. Musculoskeletal: No extremity edema. No deformity. No tenderness in the extremities. All extremities neurovascularly intact. Skin: Warm and dry. No acute rashes. Neurological: Alert and oriented. CN II-XII intact. Strength 5/5 bilateral upper and lower extremities. Sensation intact to light touch. Psychiatric: Mood/affect: depressed      LABS  I have reviewed all labs for this visit.    Results for orders placed or performed during the hospital encounter of 03/01/21   CBC Auto Differential   Result Value Ref Range    WBC 9.3 4.0 - 11.0 K/uL    RBC 4.09 4.00 - 5.20 M/uL    Hemoglobin 14.0 12.0 - 16.0 g/dL    Hematocrit 41.0 36.0 (TYLENOL) level   Result Value Ref Range    Acetaminophen Level <5 (L) 10 - 30 ug/mL   Salicylate   Result Value Ref Range    Salicylate, Serum <0.9 (L) 15.0 - 30.0 mg/dL   Sample possible blood bank testing   Result Value Ref Range    Specimen Status MER    Magnesium   Result Value Ref Range    Magnesium 2.40 1.80 - 2.40 mg/dL   EKG 12 lead   Result Value Ref Range    Ventricular Rate 124 BPM    Atrial Rate 124 BPM    P-R Interval 140 ms    QRS Duration 82 ms    Q-T Interval 336 ms    QTc Calculation (Bazett) 482 ms    P Axis 48 degrees    R Axis -1 degrees    T Axis 29 degrees    Diagnosis       Sinus tachycardiaConfirmed by MADELYN LEMUS MD ([id number]) on 3/1/2021 10:54:05 AM       RADIOLOGY  I have reviewed all radiographic studies for this visit. No orders to display        ECG  EKG interpreted by myself. Rate: 124  Rhythm: sinus tachy  Axis: normal  Intervals:  QRS 82 QTc 482  ST Segments: no acute abnormality  T waves: no acute abnormality  Comparison: no prior  Impression: sinus tachy with prolonged QTc       ED COURSE/MDM  Patient seen and evaluated. Old records reviewed. Labs and imaging reviewed and results discussed with patient/family to extent possible. 36 y.o. female presents following intentional diphenhydramine overdose. Exam notable for tachycardic rate, mydriasis. Neuro exam otherwise non-focal. EKG sinus tachy with mild prolongation of QTc. Renal panel no significant electrolyte abnormalities or creatinine elevation. Pregnancy negative. Acetaminophen and salicylates negative. Ethanol negative. U tox panel negative. Patient's heart rate improved with observation and administration of IV fluids. Hallucinations resolved with observation alone. Patient feeling well at this time. Given intentional overdose I did sign a statement of believe. Patient is medically cleared. Will attempt facilitate transfer to [facility] for further evaluation and treatment.     I have performed Continue Regimen: TMC as needed Otc Regimen: Regular moisturizer cream daily Detail Level: Zone

## 2024-04-27 NOTE — PLAN OF CARE
Cross cover   Notified by RN that pt was having severe abd pain requiring IV pain meds for control and pt not comfortable going home today till pain improved. Hold d/c at this time. Continue pain management per orders.
Problem: Pain  Goal: Verbalizes/displays adequate comfort level or baseline comfort level  3/6/2023 0958 by Tamiko Swain RN  Flowsheets (Taken 3/6/2023 3890)  Verbalizes/displays adequate comfort level or baseline comfort level:   Encourage patient to monitor pain and request assistance   Assess pain using appropriate pain scale   Administer analgesics based on type and severity of pain and evaluate response   Implement non-pharmacological measures as appropriate and evaluate response
Problem: Pain  Goal: Verbalizes/displays adequate comfort level or baseline comfort level  3/7/2023 1616 by Evert Quarles RN  Outcome: Completed     Problem: Safety - Adult  Goal: Free from fall injury  3/7/2023 1616 by Evert Quarles RN  Outcome: Completed
Problem: Pain  Goal: Verbalizes/displays adequate comfort level or baseline comfort level  Outcome: Progressing     Problem: Safety - Adult  Goal: Free from fall injury  Outcome: Progressing
Problem: Pain  Goal: Verbalizes/displays adequate comfort level or baseline comfort level  Outcome: Progressing  Flowsheets (Taken 3/5/2023 1153 by Gianna Hartmann RN)  Verbalizes/displays adequate comfort level or baseline comfort level:   Encourage patient to monitor pain and request assistance   Assess pain using appropriate pain scale   Implement non-pharmacological measures as appropriate and evaluate response   Consider cultural and social influences on pain and pain management   Notify Licensed Independent Practitioner if interventions unsuccessful or patient reports new pain   Administer analgesics based on type and severity of pain and evaluate response     Problem: Safety - Adult  Goal: Free from fall injury  Outcome: Progressing
Admission

## 2024-08-05 NOTE — TELEPHONE ENCOUNTER
Refill Request     Last Seen: 8/8/2023    Last Written: 1/2024      Next Appointment:   No future appointments.          Requested Prescriptions      No prescriptions requested or ordered in this encounter

## 2024-08-06 RX ORDER — LEVOCETIRIZINE DIHYDROCHLORIDE 5 MG/1
TABLET, FILM COATED ORAL
Qty: 90 TABLET | Refills: 3 | Status: SHIPPED | OUTPATIENT
Start: 2024-08-06

## 2024-09-12 ENCOUNTER — TELEPHONE (OUTPATIENT)
Dept: PRIMARY CARE CLINIC | Age: 44
End: 2024-09-12

## 2024-09-16 ENCOUNTER — OFFICE VISIT (OUTPATIENT)
Dept: PRIMARY CARE CLINIC | Age: 44
End: 2024-09-16
Payer: COMMERCIAL

## 2024-09-16 VITALS
OXYGEN SATURATION: 98 % | SYSTOLIC BLOOD PRESSURE: 122 MMHG | WEIGHT: 137.4 LBS | HEIGHT: 65 IN | BODY MASS INDEX: 22.89 KG/M2 | DIASTOLIC BLOOD PRESSURE: 78 MMHG | TEMPERATURE: 99.6 F | HEART RATE: 93 BPM

## 2024-09-16 DIAGNOSIS — D64.9 ANEMIA, UNSPECIFIED TYPE: ICD-10-CM

## 2024-09-16 DIAGNOSIS — R61 HYPERHIDROSIS: ICD-10-CM

## 2024-09-16 DIAGNOSIS — F43.10 PTSD (POST-TRAUMATIC STRESS DISORDER): ICD-10-CM

## 2024-09-16 DIAGNOSIS — Z12.31 ENCOUNTER FOR SCREENING MAMMOGRAM FOR MALIGNANT NEOPLASM OF BREAST: ICD-10-CM

## 2024-09-16 DIAGNOSIS — E87.1 HYPONATREMIA: ICD-10-CM

## 2024-09-16 DIAGNOSIS — F33.2 SEVERE EPISODE OF RECURRENT MAJOR DEPRESSIVE DISORDER, WITHOUT PSYCHOTIC FEATURES (HCC): Primary | ICD-10-CM

## 2024-09-16 DIAGNOSIS — F41.9 ANXIETY: ICD-10-CM

## 2024-09-16 PROCEDURE — G8420 CALC BMI NORM PARAMETERS: HCPCS | Performed by: STUDENT IN AN ORGANIZED HEALTH CARE EDUCATION/TRAINING PROGRAM

## 2024-09-16 PROCEDURE — 99214 OFFICE O/P EST MOD 30 MIN: CPT | Performed by: STUDENT IN AN ORGANIZED HEALTH CARE EDUCATION/TRAINING PROGRAM

## 2024-09-16 PROCEDURE — G8427 DOCREV CUR MEDS BY ELIG CLIN: HCPCS | Performed by: STUDENT IN AN ORGANIZED HEALTH CARE EDUCATION/TRAINING PROGRAM

## 2024-09-16 PROCEDURE — 1036F TOBACCO NON-USER: CPT | Performed by: STUDENT IN AN ORGANIZED HEALTH CARE EDUCATION/TRAINING PROGRAM

## 2024-09-16 RX ORDER — DESVENLAFAXINE 25 MG/1
25 TABLET, EXTENDED RELEASE ORAL DAILY
Qty: 30 TABLET | Refills: 0 | Status: SHIPPED | OUTPATIENT
Start: 2024-09-16

## 2024-09-16 SDOH — ECONOMIC STABILITY: FOOD INSECURITY: WITHIN THE PAST 12 MONTHS, YOU WORRIED THAT YOUR FOOD WOULD RUN OUT BEFORE YOU GOT MONEY TO BUY MORE.: OFTEN TRUE

## 2024-09-16 SDOH — ECONOMIC STABILITY: FOOD INSECURITY: WITHIN THE PAST 12 MONTHS, THE FOOD YOU BOUGHT JUST DIDN'T LAST AND YOU DIDN'T HAVE MONEY TO GET MORE.: OFTEN TRUE

## 2024-09-16 SDOH — ECONOMIC STABILITY: INCOME INSECURITY: HOW HARD IS IT FOR YOU TO PAY FOR THE VERY BASICS LIKE FOOD, HOUSING, MEDICAL CARE, AND HEATING?: NOT HARD AT ALL

## 2024-09-16 ASSESSMENT — PATIENT HEALTH QUESTIONNAIRE - PHQ9
7. TROUBLE CONCENTRATING ON THINGS, SUCH AS READING THE NEWSPAPER OR WATCHING TELEVISION: NOT AT ALL
10. IF YOU CHECKED OFF ANY PROBLEMS, HOW DIFFICULT HAVE THESE PROBLEMS MADE IT FOR YOU TO DO YOUR WORK, TAKE CARE OF THINGS AT HOME, OR GET ALONG WITH OTHER PEOPLE: NOT DIFFICULT AT ALL
2. FEELING DOWN, DEPRESSED OR HOPELESS: NOT AT ALL
3. TROUBLE FALLING OR STAYING ASLEEP: NOT AT ALL
6. FEELING BAD ABOUT YOURSELF - OR THAT YOU ARE A FAILURE OR HAVE LET YOURSELF OR YOUR FAMILY DOWN: NOT AT ALL
10. IF YOU CHECKED OFF ANY PROBLEMS, HOW DIFFICULT HAVE THESE PROBLEMS MADE IT FOR YOU TO DO YOUR WORK, TAKE CARE OF THINGS AT HOME, OR GET ALONG WITH OTHER PEOPLE: NOT DIFFICULT AT ALL
9. THOUGHTS THAT YOU WOULD BE BETTER OFF DEAD, OR OF HURTING YOURSELF: NOT AT ALL
4. FEELING TIRED OR HAVING LITTLE ENERGY: NOT AT ALL
SUM OF ALL RESPONSES TO PHQ QUESTIONS 1-9: 0
5. POOR APPETITE OR OVEREATING: NOT AT ALL
SUM OF ALL RESPONSES TO PHQ9 QUESTIONS 1 & 2: 0
1. LITTLE INTEREST OR PLEASURE IN DOING THINGS: NOT AT ALL
1. LITTLE INTEREST OR PLEASURE IN DOING THINGS: NOT AT ALL
SUM OF ALL RESPONSES TO PHQ QUESTIONS 1-9: 0
2. FEELING DOWN, DEPRESSED OR HOPELESS: NOT AT ALL
7. TROUBLE CONCENTRATING ON THINGS, SUCH AS READING THE NEWSPAPER OR WATCHING TELEVISION: NOT AT ALL
9. THOUGHTS THAT YOU WOULD BE BETTER OFF DEAD, OR OF HURTING YOURSELF: NOT AT ALL
5. POOR APPETITE OR OVEREATING: NOT AT ALL
SUM OF ALL RESPONSES TO PHQ QUESTIONS 1-9: 0
8. MOVING OR SPEAKING SO SLOWLY THAT OTHER PEOPLE COULD HAVE NOTICED. OR THE OPPOSITE - BEING SO FIDGETY OR RESTLESS THAT YOU HAVE BEEN MOVING AROUND A LOT MORE THAN USUAL: NOT AT ALL
SUM OF ALL RESPONSES TO PHQ QUESTIONS 1-9: 0
SUM OF ALL RESPONSES TO PHQ QUESTIONS 1-9: 0
6. FEELING BAD ABOUT YOURSELF - OR THAT YOU ARE A FAILURE OR HAVE LET YOURSELF OR YOUR FAMILY DOWN: NOT AT ALL
8. MOVING OR SPEAKING SO SLOWLY THAT OTHER PEOPLE COULD HAVE NOTICED. OR THE OPPOSITE, BEING SO FIGETY OR RESTLESS THAT YOU HAVE BEEN MOVING AROUND A LOT MORE THAN USUAL: NOT AT ALL
3. TROUBLE FALLING OR STAYING ASLEEP: NOT AT ALL
4. FEELING TIRED OR HAVING LITTLE ENERGY: NOT AT ALL

## 2024-09-16 ASSESSMENT — ENCOUNTER SYMPTOMS
VOMITING: 0
DIARRHEA: 0
SHORTNESS OF BREATH: 0
NAUSEA: 0
COUGH: 0
ABDOMINAL PAIN: 0
CHEST TIGHTNESS: 0
CONSTIPATION: 0
BACK PAIN: 0
WHEEZING: 0

## 2024-09-16 ASSESSMENT — ANXIETY QUESTIONNAIRES
6. BECOMING EASILY ANNOYED OR IRRITABLE: NOT AT ALL
7. FEELING AFRAID AS IF SOMETHING AWFUL MIGHT HAPPEN: NOT AT ALL
5. BEING SO RESTLESS THAT IT IS HARD TO SIT STILL: NOT AT ALL
1. FEELING NERVOUS, ANXIOUS, OR ON EDGE: NOT AT ALL
4. TROUBLE RELAXING: NOT AT ALL
2. NOT BEING ABLE TO STOP OR CONTROL WORRYING: NOT AT ALL
GAD7 TOTAL SCORE: 0
3. WORRYING TOO MUCH ABOUT DIFFERENT THINGS: NOT AT ALL

## 2024-09-25 DIAGNOSIS — F41.9 ANXIETY AND DEPRESSION: Primary | ICD-10-CM

## 2024-09-25 DIAGNOSIS — F32.A ANXIETY AND DEPRESSION: Primary | ICD-10-CM

## 2024-09-25 RX ORDER — DESVENLAFAXINE 25 MG/1
25 TABLET, EXTENDED RELEASE ORAL DAILY
Qty: 30 TABLET | Refills: 2 | Status: SHIPPED | OUTPATIENT
Start: 2024-09-25 | End: 2024-09-25 | Stop reason: DRUGHIGH

## 2024-09-25 RX ORDER — DESVENLAFAXINE 50 MG/1
50 TABLET, FILM COATED, EXTENDED RELEASE ORAL DAILY
Qty: 90 TABLET | Refills: 3 | Status: SHIPPED | OUTPATIENT
Start: 2024-09-25 | End: 2025-09-25

## 2025-01-03 ENCOUNTER — OFFICE VISIT (OUTPATIENT)
Dept: PRIMARY CARE CLINIC | Age: 45
End: 2025-01-03

## 2025-01-03 VITALS
DIASTOLIC BLOOD PRESSURE: 68 MMHG | BODY MASS INDEX: 22.47 KG/M2 | WEIGHT: 135 LBS | SYSTOLIC BLOOD PRESSURE: 110 MMHG | OXYGEN SATURATION: 98 % | HEART RATE: 99 BPM

## 2025-01-03 DIAGNOSIS — R05.1 ACUTE COUGH: ICD-10-CM

## 2025-01-03 DIAGNOSIS — J32.9 BACTERIAL SINUSITIS: Primary | ICD-10-CM

## 2025-01-03 DIAGNOSIS — R09.81 SINUS CONGESTION: ICD-10-CM

## 2025-01-03 DIAGNOSIS — B96.89 BACTERIAL SINUSITIS: Primary | ICD-10-CM

## 2025-01-03 RX ORDER — CETIRIZINE HYDROCHLORIDE 10 MG/1
10 TABLET ORAL DAILY
Qty: 90 TABLET | Refills: 1 | Status: SHIPPED | OUTPATIENT
Start: 2025-01-03

## 2025-01-03 RX ORDER — DOXYCYCLINE HYCLATE 100 MG
100 TABLET ORAL 2 TIMES DAILY
Qty: 14 TABLET | Refills: 0 | Status: SHIPPED | OUTPATIENT
Start: 2025-01-03 | End: 2025-01-10

## 2025-01-03 RX ORDER — FLUTICASONE PROPIONATE 50 MCG
2 SPRAY, SUSPENSION (ML) NASAL DAILY
Qty: 48 G | Refills: 1 | Status: SHIPPED | OUTPATIENT
Start: 2025-01-03

## 2025-01-03 RX ORDER — BENZONATATE 100 MG/1
100 CAPSULE ORAL 2 TIMES DAILY PRN
Qty: 20 CAPSULE | Refills: 1 | Status: SHIPPED | OUTPATIENT
Start: 2025-01-03 | End: 2025-01-23

## 2025-01-03 ASSESSMENT — PATIENT HEALTH QUESTIONNAIRE - PHQ9
SUM OF ALL RESPONSES TO PHQ QUESTIONS 1-9: 0
5. POOR APPETITE OR OVEREATING: NOT AT ALL
6. FEELING BAD ABOUT YOURSELF - OR THAT YOU ARE A FAILURE OR HAVE LET YOURSELF OR YOUR FAMILY DOWN: NOT AT ALL
2. FEELING DOWN, DEPRESSED OR HOPELESS: NOT AT ALL
9. THOUGHTS THAT YOU WOULD BE BETTER OFF DEAD, OR OF HURTING YOURSELF: NOT AT ALL
1. LITTLE INTEREST OR PLEASURE IN DOING THINGS: NOT AT ALL
4. FEELING TIRED OR HAVING LITTLE ENERGY: NOT AT ALL
3. TROUBLE FALLING OR STAYING ASLEEP: NOT AT ALL
7. TROUBLE CONCENTRATING ON THINGS, SUCH AS READING THE NEWSPAPER OR WATCHING TELEVISION: NOT AT ALL
SUM OF ALL RESPONSES TO PHQ QUESTIONS 1-9: 0
10. IF YOU CHECKED OFF ANY PROBLEMS, HOW DIFFICULT HAVE THESE PROBLEMS MADE IT FOR YOU TO DO YOUR WORK, TAKE CARE OF THINGS AT HOME, OR GET ALONG WITH OTHER PEOPLE: NOT DIFFICULT AT ALL
SUM OF ALL RESPONSES TO PHQ9 QUESTIONS 1 & 2: 0
8. MOVING OR SPEAKING SO SLOWLY THAT OTHER PEOPLE COULD HAVE NOTICED. OR THE OPPOSITE, BEING SO FIGETY OR RESTLESS THAT YOU HAVE BEEN MOVING AROUND A LOT MORE THAN USUAL: NOT AT ALL

## 2025-01-03 NOTE — PROGRESS NOTES
[facility]                                  [address], [address] [address]         Phone: [phone]    Date of Service:  1/3/2025    CC:  \"sick\"    Subjective     HPI    9 days ago on last Wed    Nasal congestion    Chest congestion    \"Barky\" cough    Fever 101.6    Headache    \"Hot-cold-hot-cold\"    Started to feel better yesterday    But acutely worsen today especially on the right side of face    Nasal discharge changed to yellow and green    Son had URI before    ROS    Relevant ROS were mentioned in HPI    Vitals:    01/03/25 0945   BP: 110/68   Site: Left Upper Arm   Position: Sitting   Cuff Size: Medium Adult   Pulse: 99   SpO2: 98%   Weight: 61.2 kg (135 lb)       ALG  Latex, Penicillin g, Penicillins, Bupropion, Ciprofloxacin, Nitrofurantoin, Other, Sulfa antibiotics, and Vancomycin    Outpatient Medications Marked as Taking for the 1/3/25 encounter (Office Visit) with Matthew Forbes MD PhD   Medication Sig Dispense Refill    doxycycline hyclate (VIBRA-TABS) 100 MG tablet Take 1 tablet by mouth 2 times daily for 7 days 14 tablet 0    fluticasone (FLONASE) 50 MCG/ACT nasal spray 2 sprays by Each Nostril route daily 48 g 1    cetirizine (ZYRTEC) 10 MG tablet Take 1 tablet by mouth daily 90 tablet 1    benzonatate (TESSALON) 100 MG capsule Take 1 capsule by mouth 2 times daily as needed for Cough 20 capsule 1    desvenlafaxine succinate (PRISTIQ) 50 MG TB24 extended release tablet Take 1 tablet by mouth daily 90 tablet 3    levocetirizine (XYZAL) 5 MG tablet TAKE 2 TABLETS(10 MG) BY MOUTH EVERY NIGHT 90 tablet 3       Objective     GEN  - reviewed vitals above  - well nourished, well developed, no distress       HEENT  - no trouble breathing through nose  - fluid behind right TM, left ear was normal  - oropharynx normal  - no cervical LAD  - tenderness to facial palp R > L  CV    - appears well-perfused    - regular

## 2025-03-25 ENCOUNTER — RESULTS FOLLOW-UP (OUTPATIENT)
Dept: PRIMARY CARE CLINIC | Age: 45
End: 2025-03-25

## 2025-03-25 ENCOUNTER — HOSPITAL ENCOUNTER (OUTPATIENT)
Age: 45
Discharge: HOME | End: 2025-03-25
Payer: COMMERCIAL

## 2025-03-25 ENCOUNTER — HOSPITAL ENCOUNTER (OUTPATIENT)
Dept: GENERAL RADIOLOGY | Age: 45
Discharge: HOME | End: 2025-03-25
Payer: COMMERCIAL

## 2025-03-25 ENCOUNTER — OFFICE VISIT (OUTPATIENT)
Dept: PRIMARY CARE CLINIC | Age: 45
End: 2025-03-25
Payer: COMMERCIAL

## 2025-03-25 VITALS
WEIGHT: 139.6 LBS | SYSTOLIC BLOOD PRESSURE: 116 MMHG | OXYGEN SATURATION: 99 % | HEART RATE: 104 BPM | BODY MASS INDEX: 23.26 KG/M2 | HEIGHT: 65 IN | RESPIRATION RATE: 16 BRPM | TEMPERATURE: 98.1 F | DIASTOLIC BLOOD PRESSURE: 70 MMHG

## 2025-03-25 DIAGNOSIS — M79.89 SWOLLEN FINGER: ICD-10-CM

## 2025-03-25 DIAGNOSIS — M79.644 PAIN OF FINGER OF RIGHT HAND: ICD-10-CM

## 2025-03-25 DIAGNOSIS — R76.8 ELEVATED RHEUMATOID FACTOR: Primary | ICD-10-CM

## 2025-03-25 DIAGNOSIS — M79.644 PAIN OF FINGER OF RIGHT HAND: Primary | ICD-10-CM

## 2025-03-25 LAB
BASOPHILS # BLD: 0.1 K/UL (ref 0–0.2)
BASOPHILS NFR BLD: 1.7 %
CRP SERPL-MCNC: <3 MG/L (ref 0–5.1)
DEPRECATED RDW RBC AUTO: 13.1 % (ref 12.4–15.4)
EOSINOPHIL # BLD: 0.1 K/UL (ref 0–0.6)
EOSINOPHIL NFR BLD: 2.6 %
ERYTHROCYTE [SEDIMENTATION RATE] IN BLOOD BY WESTERGREN METHOD: 15 MM/HR (ref 0–20)
HCT VFR BLD AUTO: 39.5 % (ref 36–48)
HGB BLD-MCNC: 13.3 G/DL (ref 12–16)
LYMPHOCYTES # BLD: 1.5 K/UL (ref 1–5.1)
LYMPHOCYTES NFR BLD: 36.8 %
MCH RBC QN AUTO: 34.1 PG (ref 26–34)
MCHC RBC AUTO-ENTMCNC: 33.8 G/DL (ref 31–36)
MCV RBC AUTO: 101 FL (ref 80–100)
MONOCYTES # BLD: 0.4 K/UL (ref 0–1.3)
MONOCYTES NFR BLD: 9.1 %
NEUTROPHILS # BLD: 2 K/UL (ref 1.7–7.7)
NEUTROPHILS NFR BLD: 49.8 %
PLATELET # BLD AUTO: 219 K/UL (ref 135–450)
PMV BLD AUTO: 9 FL (ref 5–10.5)
RBC # BLD AUTO: 3.91 M/UL (ref 4–5.2)
RHEUMATOID FACT SER IA-ACNC: 40.7 IU/ML
WBC # BLD AUTO: 4.1 K/UL (ref 4–11)

## 2025-03-25 PROCEDURE — 86140 C-REACTIVE PROTEIN: CPT

## 2025-03-25 PROCEDURE — 86200 CCP ANTIBODY: CPT

## 2025-03-25 PROCEDURE — 86038 ANTINUCLEAR ANTIBODIES: CPT

## 2025-03-25 PROCEDURE — G8420 CALC BMI NORM PARAMETERS: HCPCS

## 2025-03-25 PROCEDURE — 85025 COMPLETE CBC W/AUTO DIFF WBC: CPT

## 2025-03-25 PROCEDURE — 99214 OFFICE O/P EST MOD 30 MIN: CPT

## 2025-03-25 PROCEDURE — 73130 X-RAY EXAM OF HAND: CPT

## 2025-03-25 PROCEDURE — 36415 COLL VENOUS BLD VENIPUNCTURE: CPT

## 2025-03-25 PROCEDURE — G8427 DOCREV CUR MEDS BY ELIG CLIN: HCPCS

## 2025-03-25 PROCEDURE — 86431 RHEUMATOID FACTOR QUANT: CPT

## 2025-03-25 PROCEDURE — 1036F TOBACCO NON-USER: CPT

## 2025-03-25 PROCEDURE — 85652 RBC SED RATE AUTOMATED: CPT

## 2025-03-25 SDOH — ECONOMIC STABILITY: FOOD INSECURITY: WITHIN THE PAST 12 MONTHS, YOU WORRIED THAT YOUR FOOD WOULD RUN OUT BEFORE YOU GOT MONEY TO BUY MORE.: OFTEN TRUE

## 2025-03-25 SDOH — ECONOMIC STABILITY: FOOD INSECURITY: WITHIN THE PAST 12 MONTHS, THE FOOD YOU BOUGHT JUST DIDN'T LAST AND YOU DIDN'T HAVE MONEY TO GET MORE.: OFTEN TRUE

## 2025-03-25 NOTE — PROGRESS NOTES
PROGRESS NOTE     Sara Coleman MD  [facility]                                  [address], [address], [facility] [address]         Phone: [phone]     Date of Service:  3/25/2025     The following was written by MS Maria Eugenia Mathur     Patient ID: Purnima Jenkins is a 44 y.o. previously healthy female presenting for 6 weeks of joint pain.         Assessment / Plan:      1. Pain of finger of right hand  2. Swollen finger  Concern for rheumatoid arthritis given classic picture of worst pain in AM and improving with movement throughout the day with mild constitutional symptoms. Also possible to consider reactive arthritis given recent URI. Given no swelling of hand and good ROM of finger and hand, less concern for flexor tenosynovitis. If RA, plan to come back in and discuss treatment. Otherwise symptomatic treatment and PT could be considered.   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      R index feels tight, discomfort, fat, not mobile worst in morning but improves throughout the day  Pain mainly in 1st PIP of index with some MCP pain too  Movement helps  Last week it hurt so bad just wants to cut it off. Also was experiencing subjective fever, achiness, chills then too  Did take some NSAIDs with minimal relief.     Hips also hurt often  Toes on R foot want to crack a lot, no swelling  Both have been ongoing 
her    Noticed two lesions - one on back, one on side. Mostly resolved. Per Pt picture, erythematous. Not painful or pruritic. Only noticed them after her  pointed them out.   Put cortisone cream on the spots and they healed within a day or 2, mild lesion still present.     Red and watery R eye  Gets bad seasonal allergies, only taking zyrtec. This is the worst its been   Eyes are watering or dry all the time. Clear discahrge only    R handed. Does not do a lot of typing for work.     No one in fam has autoimmune disease  Recent URI, likely sinus infection in Jan tx with 7 days Doxy. Improved after 3 days.     ROS:    Positive for: fatigue, fever, aches, chills,  Negative for:     Vitals:    03/25/25 0855   BP: 116/70   BP Site: Left Upper Arm   Patient Position: Sitting   BP Cuff Size: Small Adult   Pulse: (!) 104   Resp: 16   Temp: 98.1 °F (36.7 °C)   TempSrc: Oral   SpO2: 99%   Weight: 63.3 kg (139 lb 9.6 oz)   Height: 1.651 m (5' 5\")       Outpatient Medications Marked as Taking for the 3/25/25 encounter (Office Visit) with Jaun Becerra, DO   Medication Sig Dispense Refill    fluticasone (FLONASE) 50 MCG/ACT nasal spray 2 sprays by Each Nostril route daily 48 g 1    cetirizine (ZYRTEC) 10 MG tablet Take 1 tablet by mouth daily 90 tablet 1    desvenlafaxine succinate (PRISTIQ) 50 MG TB24 extended release tablet Take 1 tablet by mouth daily 90 tablet 3    levocetirizine (XYZAL) 5 MG tablet TAKE 2 TABLETS(10 MG) BY MOUTH EVERY NIGHT 90 tablet 3             Objective:   Constitutional:   Reviewed vitals above  Well Nourished, well developed, no distress       Musculoskeletal:  R 1st PIP and R 1 MCP mildly swollen and pain on palpation  Strength 4/5 in R index flexion, all others 5/5.  strength 5/5  Negative phalen  Negative ficklesteins  Good ROM in fingers, wrists, elbows bilaterally  No swelling of hand  Skin:  Mild erythema on knuckles of R hand  Healing small pinprick rash on R hip and under

## 2025-03-26 LAB
ANA SER QL IA: NEGATIVE
CCP IGG SERPL-ACNC: <0.5 U/ML (ref 0–2.9)

## 2025-03-27 ENCOUNTER — TELEPHONE (OUTPATIENT)
Dept: PRIMARY CARE CLINIC | Age: 45
End: 2025-03-27

## 2025-03-27 NOTE — TELEPHONE ENCOUNTER
Patient is calling and she has some questions and she wants to talk to someone .    Please advise   Patient name : vicenta Jenkins  Ph:[phone]

## 2025-03-27 NOTE — TELEPHONE ENCOUNTER
Referral printed and faxed to Dr. Page's office at [phone].  Confirmation scanned into media.

## 2025-04-17 NOTE — PROGRESS NOTES
No Known Problems Brother     No Known Problems Paternal Grandfather     No Known Problems Paternal Grandmother      Health Maintenance Completed:  Health Maintenance   Topic Date Due    Hepatitis C screen  Never done    Hepatitis B vaccine (1 of 3 - 19+ 3-dose series) Never done    DTaP/Tdap/Td vaccine (1 - Tdap) Never done    Pneumococcal 0-49 years Vaccine (1 of 2 - PCV) Never done    Breast cancer screen  08/25/2020    COVID-19 Vaccine (3 - 2024-25 season) 09/01/2024    Flu vaccine (Season Ended) 08/01/2025    Depression Monitoring  01/03/2026    Lipids  03/03/2026    Hepatitis A vaccine  Aged Out    Hib vaccine  Aged Out    HPV vaccine  Aged Out    Polio vaccine  Aged Out    Meningococcal (ACWY) vaccine  Aged Out    Meningococcal B vaccine  Aged Out    Varicella vaccine  Discontinued    Diabetes screen  Discontinued    HIV screen  Discontinued    Cervical cancer screen  Discontinued      Immunization History   Administered Date(s) Administered    COVID-19, MODERNA BLUE border, Primary or Immunocompromised, (age 12y+), IM, 100 mcg/0.5mL 01/20/2021, 02/19/2021     Review of Systems:  Pertinent positive and negative ROS discussed in HPI.    Physical Exam:   Vitals:    04/18/25 1033   BP: 118/82   BP Site: Left Upper Arm   Patient Position: Sitting   BP Cuff Size: Small Adult   Pulse: (!) 109   Temp: 98.3 °F (36.8 °C)   TempSrc: Oral   SpO2: 99%   Weight: 60.6 kg (133 lb 9.6 oz)     Body mass index is 22.23 kg/m².  Wt Readings from Last 3 Encounters:   04/18/25 60.6 kg (133 lb 9.6 oz)   03/25/25 63.3 kg (139 lb 9.6 oz)   01/03/25 61.2 kg (135 lb)     BP Readings from Last 3 Encounters:   04/18/25 118/82   03/25/25 116/70   01/03/25 110/68     Physical Exam  Constitutional:       Appearance: Normal appearance.   HENT:      Head: Normocephalic and atraumatic.      Right Ear: External ear normal.      Left Ear: External ear normal.      Nose: Nose normal.   Cardiovascular:      Rate and Rhythm: Normal rate and regular

## 2025-04-18 ENCOUNTER — OFFICE VISIT (OUTPATIENT)
Dept: PRIMARY CARE CLINIC | Age: 45
End: 2025-04-18

## 2025-04-18 VITALS
BODY MASS INDEX: 22.23 KG/M2 | WEIGHT: 133.6 LBS | DIASTOLIC BLOOD PRESSURE: 82 MMHG | SYSTOLIC BLOOD PRESSURE: 118 MMHG | OXYGEN SATURATION: 99 % | HEART RATE: 109 BPM | TEMPERATURE: 98.3 F

## 2025-04-18 DIAGNOSIS — R20.2 PARESTHESIA OF ARM: ICD-10-CM

## 2025-04-18 DIAGNOSIS — R53.83 FATIGUE, UNSPECIFIED TYPE: ICD-10-CM

## 2025-04-18 DIAGNOSIS — M79.89 SWOLLEN FINGER: ICD-10-CM

## 2025-04-18 DIAGNOSIS — R53.83 FATIGUE, UNSPECIFIED TYPE: Primary | ICD-10-CM

## 2025-04-18 DIAGNOSIS — M79.644 PAIN OF FINGER OF RIGHT HAND: ICD-10-CM

## 2025-04-18 DIAGNOSIS — R51.9 ACUTE NONINTRACTABLE HEADACHE, UNSPECIFIED HEADACHE TYPE: ICD-10-CM

## 2025-04-18 LAB
25(OH)D3 SERPL-MCNC: 26 NG/ML
ALBUMIN SERPL-MCNC: 4.7 G/DL (ref 3.4–5)
ALBUMIN/GLOB SERPL: 1.9 {RATIO} (ref 1.1–2.2)
ALP SERPL-CCNC: 61 U/L (ref 40–129)
ALT SERPL-CCNC: 20 U/L (ref 10–40)
ANION GAP SERPL CALCULATED.3IONS-SCNC: 11 MMOL/L (ref 3–16)
AST SERPL-CCNC: 23 U/L (ref 15–37)
BILIRUB SERPL-MCNC: <0.2 MG/DL (ref 0–1)
BUN SERPL-MCNC: 12 MG/DL (ref 7–20)
CALCIUM SERPL-MCNC: 9.2 MG/DL (ref 8.3–10.6)
CHLORIDE SERPL-SCNC: 102 MMOL/L (ref 99–110)
CO2 SERPL-SCNC: 24 MMOL/L (ref 21–32)
CREAT SERPL-MCNC: 0.7 MG/DL (ref 0.6–1.1)
GFR SERPLBLD CREATININE-BSD FMLA CKD-EPI: >90 ML/MIN/{1.73_M2}
GLUCOSE SERPL-MCNC: 83 MG/DL (ref 70–99)
POTASSIUM SERPL-SCNC: 4.4 MMOL/L (ref 3.5–5.1)
PROT SERPL-MCNC: 7.2 G/DL (ref 6.4–8.2)
SODIUM SERPL-SCNC: 137 MMOL/L (ref 136–145)
TSH SERPL DL<=0.005 MIU/L-ACNC: 0.91 UIU/ML (ref 0.27–4.2)
VIT B12 SERPL-MCNC: 592 PG/ML (ref 211–911)

## 2025-04-21 ENCOUNTER — RESULTS FOLLOW-UP (OUTPATIENT)
Dept: PRIMARY CARE CLINIC | Age: 45
End: 2025-04-21

## 2025-04-21 NOTE — RESULT ENCOUNTER NOTE
Ross Felton,    Your bloodwork was normal aside from a low vitamin D level, which could partially explain some of your fatigue. I recommend starting to take a daily vitamin D3 supplement that is 800 international units (20 micrograms) in dose.

## 2025-06-23 DIAGNOSIS — R09.81 SINUS CONGESTION: ICD-10-CM

## 2025-06-23 RX ORDER — CETIRIZINE HYDROCHLORIDE 10 MG/1
10 TABLET ORAL DAILY
Qty: 90 TABLET | Refills: 1 | Status: SHIPPED | OUTPATIENT
Start: 2025-06-23

## 2025-06-23 NOTE — TELEPHONE ENCOUNTER
Refill Request       Last Seen: Last Seen Department: 4/18/2025  Last Seen by PCP: 1/3/2025    Last Written: 1/3/25 90 1 refill    Next Appointment:   No future appointments.          Requested Prescriptions     Pending Prescriptions Disp Refills    cetirizine (ZYRTEC) 10 MG tablet [Pharmacy Med Name: CETIRIZINE 10MG TABLETS] 90 tablet 1     Sig: TAKE 1 TABLET BY MOUTH DAILY

## 2025-07-22 ENCOUNTER — E-VISIT (OUTPATIENT)
Dept: PRIMARY CARE CLINIC | Age: 45
End: 2025-07-22
Payer: COMMERCIAL

## 2025-07-22 DIAGNOSIS — N76.0 ACUTE VAGINITIS: Primary | ICD-10-CM

## 2025-07-22 PROCEDURE — 99422 OL DIG E/M SVC 11-20 MIN: CPT | Performed by: NURSE PRACTITIONER

## 2025-07-22 RX ORDER — METRONIDAZOLE 500 MG/1
500 TABLET ORAL 2 TIMES DAILY
Qty: 14 TABLET | Refills: 0 | Status: SHIPPED | OUTPATIENT
Start: 2025-07-22 | End: 2025-07-29

## 2025-07-22 NOTE — PROGRESS NOTES
Purnima Jenkins (1980) initiated an asynchronous digital communication through [employer].    HPI: per patient questionnaire     Exam: not applicable    Diagnoses and all orders for this visit:  Diagnoses and all orders for this visit:    Acute vaginitis    Other orders  -     metroNIDAZOLE (FLAGYL) 500 MG tablet; Take 1 tablet by mouth 2 times daily for 7 days    Will treat for bv. Supportive care. Fu with pcp or gyn as needed       18 minutes were spent on the digital evaluation and management of this patient.    Mariah Wilkins, AGATHA - CNP

## 2025-07-31 ENCOUNTER — OFFICE VISIT (OUTPATIENT)
Dept: PRIMARY CARE CLINIC | Age: 45
End: 2025-07-31

## 2025-07-31 VITALS
OXYGEN SATURATION: 96 % | BODY MASS INDEX: 20.7 KG/M2 | WEIGHT: 124.4 LBS | HEART RATE: 115 BPM | DIASTOLIC BLOOD PRESSURE: 82 MMHG | SYSTOLIC BLOOD PRESSURE: 112 MMHG

## 2025-07-31 DIAGNOSIS — R39.9 URINARY TRACT INFECTION SYMPTOMS: Primary | ICD-10-CM

## 2025-07-31 NOTE — PROGRESS NOTES
distress.  Cardiovascular:      Rate and Rhythm: Normal rate and regular rhythm.      Heart sounds: No murmur heard.     No gallop.   Pulmonary:      Effort: Pulmonary effort is normal. No respiratory distress.      Breath sounds: No stridor. No wheezing.   Abdominal:      General: Abdomen is flat. Bowel sounds are normal. There is no distension.      Palpations: Abdomen is soft.      Tenderness: There is no abdominal tenderness.   Skin:     General: Skin is warm and dry.      Capillary Refill: Capillary refill takes less than 2 seconds.   Neurological:      General: No focal deficit present.      Mental Status: She is alert and oriented to person, place, and time.              EMR Dragon/transcription disclaimer:  Much of this encounter note is electronic transcription/translation of spoken language to printed texts.  The electronic translation of spoken language may be erroneous, or at times, nonsensical words or phrases may be inadvertently transcribed.  Although I have reviewed the note for such errors, some may still exist.

## 2025-08-01 LAB
BILIRUB UR QL STRIP.AUTO: NEGATIVE
BV BACTERIA DNA VAG QL NAA+PROBE: NOT DETECTED
C GLABRATA DNA VAG QL NAA+PROBE: NORMAL
C GLABRATA DNA VAG QL NAA+PROBE: NOT DETECTED
C KRUSEI DNA VAG QL NAA+PROBE: NOT DETECTED
CANDIDA DNA VAG QL NAA+PROBE: NOT DETECTED
CLARITY UR: CLEAR
COLOR UR: YELLOW
GLUCOSE UR STRIP.AUTO-MCNC: NEGATIVE MG/DL
HGB UR QL STRIP.AUTO: NEGATIVE
KETONES UR STRIP.AUTO-MCNC: NEGATIVE MG/DL
LEUKOCYTE ESTERASE UR QL STRIP.AUTO: NEGATIVE
NITRITE UR QL STRIP.AUTO: NEGATIVE
PH UR STRIP.AUTO: 6.5 [PH] (ref 5–8)
PROT UR STRIP.AUTO-MCNC: NEGATIVE MG/DL
SP GR UR STRIP.AUTO: 1.02 (ref 1–1.03)
T VAGINALIS DNA VAG QL NAA+PROBE: NOT DETECTED
UA DIPSTICK W REFLEX MICRO PNL UR: NORMAL
URN SPEC COLLECT METH UR: NORMAL
UROBILINOGEN UR STRIP-ACNC: 0.2 E.U./DL

## 2025-08-02 LAB — BACTERIA UR CULT: NORMAL

## 2025-08-05 ENCOUNTER — TELEPHONE (OUTPATIENT)
Dept: OBGYN CLINIC | Age: 45
End: 2025-08-05

## 2025-08-08 RX ORDER — ESTRADIOL 10 UG/1
10 TABLET, FILM COATED VAGINAL
Qty: 24 TABLET | Refills: 3 | Status: SHIPPED | OUTPATIENT
Start: 2025-08-11

## 2025-08-11 PROBLEM — Z90.710 HISTORY OF ROBOT-ASSISTED LAPAROSCOPIC HYSTERECTOMY: Status: RESOLVED | Noted: 2019-02-21 | Resolved: 2023-08-08

## 2025-08-11 PROBLEM — Z90.710 HISTORY OF ROBOT-ASSISTED LAPAROSCOPIC HYSTERECTOMY: Status: ACTIVE | Noted: 2019-02-21

## (undated) DEVICE — SCISSORS SURG DIA8MM MPLR CRV ENDOWRIST

## (undated) DEVICE — TROCAR: Brand: KII FIOS FIRST ENTRY

## (undated) DEVICE — SYSTEM ES CUP DIA3.5CM PNEUMO OCCL BLLN DISP FOR CLIN POS

## (undated) DEVICE — Device

## (undated) DEVICE — SOLUTION IV IRRIG WATER 1000ML POUR BRL 2F7114

## (undated) DEVICE — SUTURE PERMA HND 2-0 L18IN NONABSORBABLE BLK X-1 L22IN 1/2 737G

## (undated) DEVICE — TOTAL TRAY, DB, 100% SILI FOLEY, 16FR 10: Brand: MEDLINE

## (undated) DEVICE — MARYLAND BIPOLAR FORCEPS: Brand: ENDOWRIST

## (undated) DEVICE — COLUMN DRAPE

## (undated) DEVICE — DRAIN SURG 15FR L3/16IN DIA4.7MM SIL CHN RND HUBLESS FULL

## (undated) DEVICE — GLOVE SURG SZ 65 THK91MIL LTX FREE SYN POLYISOPRENE

## (undated) DEVICE — SUTURE VCRL + SZ 0 L18IN ABSRB CLR VCP112G

## (undated) DEVICE — LARGE NEEDLE DRIVER: Brand: ENDOWRIST

## (undated) DEVICE — SUTURE MCRYL + SZ 4-0 L18IN ABSRB UD L19MM PS-2 3/8 CIR MCP496G

## (undated) DEVICE — 3M™ TEGADERM™ TRANSPARENT FILM DRESSING FRAME STYLE, 1624W, 2-3/8 IN X 2-3/4 IN (6 CM X 7 CM), 100/CT 4CT/CASE: Brand: 3M™ TEGADERM™

## (undated) DEVICE — BLADELESS OBTURATOR: Brand: WECK VISTA

## (undated) DEVICE — TIP COVER ACCESSORY

## (undated) DEVICE — SUTURE MCRYL SZ 4-0 L18IN ABSRB UD L19MM PS-2 3/8 CIR PRIM Y496G

## (undated) DEVICE — NEEDLE SPNL 22GA L3.5IN BLK HUB S STL REG WALL FIT STYL W/

## (undated) DEVICE — SUTURE VCRL + SZ 3-0 L18IN ABSRB UD SH 1/2 CIR TAPERCUT NDL VCP864D

## (undated) DEVICE — CANNULA SEAL

## (undated) DEVICE — FENESTRATED BIPOLAR FORCEPS: Brand: ENDOWRIST

## (undated) DEVICE — PUMP SUC IRR TBNG L10FT W/ HNDPC ASSEMB STRYKEFLOW 2

## (undated) DEVICE — GOWN SIRUS NONREIN LG W/TWL: Brand: MEDLINE INDUSTRIES, INC.

## (undated) DEVICE — STAPLER EXT SKIN 35 WIDE S STL STPL SQUEEZE HNDL VISISTAT

## (undated) DEVICE — TISSUE RETRIEVAL SYSTEM: Brand: INZII RETRIEVAL SYSTEM

## (undated) DEVICE — ARM DRAPE

## (undated) DEVICE — MEGA SUTURECUT ND: Brand: ENDOWRIST

## (undated) DEVICE — TROCAR: Brand: KII OPTICAL ACCESS SYSTEM

## (undated) DEVICE — GOWN SIRUS NONREIN XL W/TWL: Brand: MEDLINE INDUSTRIES, INC.

## (undated) DEVICE — SUTURE VCRL + SZ 4-0 L27IN ABSRB VLT RB-1 1/2 CIR VCP304H

## (undated) DEVICE — GLOVE,SURG,SENSICARE SLT,LF,PF,7.5: Brand: MEDLINE

## (undated) DEVICE — SUTURE VCRL SZ 0 L27IN ABSRB UD L26MM CT-2 1/2 CIR J270H

## (undated) DEVICE — 3M™ TEGADERM™ TRANSPARENT FILM DRESSING FRAME STYLE WITH BORDER, 1616, 4 IN X 4-3/4 IN (10 CM X 12 CM), 50/CT 4CT/CASE: Brand: 3M™ TEGADERM™

## (undated) DEVICE — LONG TIP FORCEPS: Brand: ENDOWRIST

## (undated) DEVICE — INSUFFLATION NEEDLE TO ESTABLISH PNEUMOPERITONEUM.: Brand: INSUFFLATION NEEDLE

## (undated) DEVICE — SUTURE VCRL + SZ 0 L27IN ABSRB WHT CT-2 1/2 CIR TAPERPOINT VCP270H

## (undated) DEVICE — PROGRASP FORCEPS: Brand: ENDOWRIST

## (undated) DEVICE — 1000 SES SMOKE EVACUATION SYSTEM, LAPARSOCOPIC TUBING SET: Brand: 1000 SES

## (undated) DEVICE — SUTURE PERMA-HAND SZ 2-0 L30IN NONABSORBABLE BLK L26MM SH K833H

## (undated) DEVICE — DRAIN SURG 15FR SIL RND CHN W/ TRCR FULL FLUT DBL WRP TRAD

## (undated) DEVICE — SUTURE PDS II SZ 0 L27IN ABSRB VLT L26MM CT-2 1/2 CIR Z334H

## (undated) DEVICE — TIP IU L8CM DIA6.7MM BLU SIL FLX DISP RUMI II

## (undated) DEVICE — GAUZE,SPONGE,2"X2",8PLY,STERILE,LF,2'S: Brand: MEDLINE

## (undated) DEVICE — SYRINGE 30CC LUER LOCK: Brand: CARDINAL HEALTH